# Patient Record
Sex: FEMALE | Race: WHITE | Employment: OTHER | URBAN - METROPOLITAN AREA
[De-identification: names, ages, dates, MRNs, and addresses within clinical notes are randomized per-mention and may not be internally consistent; named-entity substitution may affect disease eponyms.]

---

## 2017-02-02 PROBLEM — M17.0 PRIMARY OSTEOARTHRITIS OF BOTH KNEES: Status: ACTIVE | Noted: 2017-02-02

## 2017-02-06 PROBLEM — Z85.828 HISTORY OF MALIGNANT NEOPLASM OF SKIN: Status: ACTIVE | Noted: 2017-02-06

## 2017-02-06 PROBLEM — R20.8 ELECTRICAL SHOCK SENSATION: Status: ACTIVE | Noted: 2017-02-06

## 2017-02-06 PROBLEM — M17.0 PRIMARY OSTEOARTHRITIS OF BOTH KNEES: Status: RESOLVED | Noted: 2017-02-02 | Resolved: 2017-02-06

## 2017-02-06 NOTE — TELEPHONE ENCOUNTER
LFV:9/26/16 with SD  Future Appt: none on file  Last Rx:9/26/16 for #30 w/3  Last Labs:10/19/16 BUN elevated  Per protocol to provider  Patient was due back in Dec for follow up -  please schedule

## 2017-02-06 NOTE — TELEPHONE ENCOUNTER
Pt sched  on wed 2/8 w/SD-please fill meds or wait for her to come in to fill it-she is going out of town Friday AM

## 2017-02-07 NOTE — TELEPHONE ENCOUNTER
Future Appointments  Date Time Provider Audie Dooley   2/8/2017 1:00 PM [facility]

## 2017-02-08 ENCOUNTER — OFFICE VISIT (OUTPATIENT)
Dept: INTERNAL MEDICINE CLINIC | Facility: CLINIC | Age: 77
End: 2017-02-08

## 2017-02-08 VITALS
SYSTOLIC BLOOD PRESSURE: 122 MMHG | WEIGHT: 146 LBS | BODY MASS INDEX: 24.92 KG/M2 | TEMPERATURE: 98 F | HEIGHT: 64 IN | DIASTOLIC BLOOD PRESSURE: 60 MMHG | HEART RATE: 64 BPM

## 2017-02-08 DIAGNOSIS — Z85.828 PERSONAL HISTORY OF OTHER MALIGNANT NEOPLASM OF SKIN: ICD-10-CM

## 2017-02-08 DIAGNOSIS — M47.816 LUMBAR FACET ARTHROPATHY: ICD-10-CM

## 2017-02-08 DIAGNOSIS — IMO0002 OSTEOARTHROSIS INVOLVING LOWER LEG: ICD-10-CM

## 2017-02-08 DIAGNOSIS — M54.16 LUMBAR RADICULITIS: ICD-10-CM

## 2017-02-08 DIAGNOSIS — M17.12 PRIMARY OSTEOARTHRITIS OF LEFT KNEE: ICD-10-CM

## 2017-02-08 DIAGNOSIS — I77.9 BILATERAL CAROTID ARTERY DISEASE (HCC): ICD-10-CM

## 2017-02-08 DIAGNOSIS — Z23 NEED FOR VACCINATION: ICD-10-CM

## 2017-02-08 DIAGNOSIS — E78.5 DYSLIPIDEMIA: Primary | ICD-10-CM

## 2017-02-08 DIAGNOSIS — Z12.31 VISIT FOR SCREENING MAMMOGRAM: ICD-10-CM

## 2017-02-08 DIAGNOSIS — R31.9 HEMATURIA: ICD-10-CM

## 2017-02-08 DIAGNOSIS — M47.818 ARTHRITIS OF SACROILIAC JOINT: ICD-10-CM

## 2017-02-08 DIAGNOSIS — S83.282D ACUTE LATERAL MENISCUS TEAR OF LEFT KNEE, SUBSEQUENT ENCOUNTER: ICD-10-CM

## 2017-02-08 DIAGNOSIS — Z98.890 S/P LEFT KNEE ARTHROSCOPY: ICD-10-CM

## 2017-02-08 DIAGNOSIS — M47.817 SPONDYLOSIS OF LUMBOSACRAL REGION WITHOUT MYELOPATHY OR RADICULOPATHY: ICD-10-CM

## 2017-02-08 DIAGNOSIS — E53.8 VITAMIN B 12 DEFICIENCY: ICD-10-CM

## 2017-02-08 DIAGNOSIS — S83.242A TEAR OF MEDIAL MENISCUS OF LEFT KNEE, CURRENT, UNSPECIFIED TEAR TYPE, INITIAL ENCOUNTER: ICD-10-CM

## 2017-02-08 DIAGNOSIS — S83.242D ACUTE MEDIAL MENISCUS TEAR, LEFT, SUBSEQUENT ENCOUNTER: ICD-10-CM

## 2017-02-08 DIAGNOSIS — M85.88 OSTEOPENIA OF SPINE: ICD-10-CM

## 2017-02-08 DIAGNOSIS — R20.8 ELECTRICAL SHOCK SENSATION: ICD-10-CM

## 2017-02-08 DIAGNOSIS — F41.9 ANXIETY: ICD-10-CM

## 2017-02-08 DIAGNOSIS — S83.242D ACUTE MEDIAL MENISCUS TEAR OF LEFT KNEE, SUBSEQUENT ENCOUNTER: ICD-10-CM

## 2017-02-08 DIAGNOSIS — J30.9 ALLERGIC RHINITIS, UNSPECIFIED ALLERGIC RHINITIS TRIGGER, UNSPECIFIED RHINITIS SEASONALITY: ICD-10-CM

## 2017-02-08 DIAGNOSIS — Z85.828 HISTORY OF MALIGNANT NEOPLASM OF SKIN: ICD-10-CM

## 2017-02-08 DIAGNOSIS — M17.0 PRIMARY OSTEOARTHRITIS OF BOTH KNEES: ICD-10-CM

## 2017-02-08 DIAGNOSIS — R32 URINARY INCONTINENCE, UNSPECIFIED TYPE: ICD-10-CM

## 2017-02-08 DIAGNOSIS — Z00.00 ENCOUNTER FOR ANNUAL HEALTH EXAMINATION: ICD-10-CM

## 2017-02-08 DIAGNOSIS — E55.9 VITAMIN D DEFICIENCY: ICD-10-CM

## 2017-02-08 DIAGNOSIS — M17.11 PRIMARY OSTEOARTHRITIS OF RIGHT KNEE: ICD-10-CM

## 2017-02-08 PROCEDURE — 99214 OFFICE O/P EST MOD 30 MIN: CPT | Performed by: NURSE PRACTITIONER

## 2017-02-08 PROCEDURE — G0009 ADMIN PNEUMOCOCCAL VACCINE: HCPCS | Performed by: NURSE PRACTITIONER

## 2017-02-08 PROCEDURE — G0438 PPPS, INITIAL VISIT: HCPCS | Performed by: NURSE PRACTITIONER

## 2017-02-08 PROCEDURE — 90670 PCV13 VACCINE IM: CPT | Performed by: NURSE PRACTITIONER

## 2017-02-08 RX ORDER — MELATONIN
1000 DAILY
COMMUNITY
End: 2017-10-25

## 2017-02-08 NOTE — PATIENT INSTRUCTIONS
Melissa Mckeon's SCREENING SCHEDULE   Tests on this list are recommended by your physician but may not be covered, or covered at this frequency, by your insurer. Please check with your insurance carrier before scheduling to verify coverage.    PREVENTATIVE cigarettes in their lifetime   • Anyone with a family history    Colorectal Cancer Screening  Covered up to Age 76     Colonoscopy Screen   Covered every 10 years- more often if abnormal Colonoscopy,3 Years due on 04/08/2018 Update Health Maintenance if ap Pneumococcal 13 (Prevnar)  Covered Once after 65 No orders found for this or any previous visit. Please get once after your 65th birthday    Pneumococcal 23 (Pneumovax)  Covered Once after 65 No orders found for this or any previous visit.  Please get once

## 2017-02-08 NOTE — PROGRESS NOTES
HPI:   Jacqui Lieberman is a 64year old female who presents for a Medicare Initial Annual Wellness visit (Once after 12 month Medicare anniversery) .     Dyslipidemia   Due for lipid  Last done 12/15 with elevated total.  Her activity has been less due to k Electrical shock sensation   None recently   MRI brain normal.      Lumbar arthritis:  Stable. Continue to monitor. History of malignant neoplasm of skin  Sees Dermatology  Dr Blade Marina. Anxiety:  Sertraline   Doing well.   Increased stress with h 20 10/19/2016   ALT 28 10/19/2016   CA 9.2 10/19/2016   ALB 3.5 10/19/2016   TSH 1.760 12/31/2015   CREATSERUM 0.80 10/19/2016   * 10/19/2016        CBC  (most recent labs)     Lab Results  Component Value Date   WBC 5.8 12/31/2015   HGB 13.6 12/31/ GENERAL: feels well otherwise  SKIN: denies any unusual skin lesions  EYES: denies blurred vision or double vision  HEENT: denies nasal congestion, sinus pain or ST  LUNGS: denies shortness of breath with exertion  CARDIOVASCULAR: denies chest pain on ex and axillary nodes normal   Neurologic: Normal    and Breasts:  normal appearance, no masses or tenderness    SUGGESTED VACCINATIONS - Influenza, Pneumococcal, Zoster, Tetanus     Immunization History   Administered Date(s) Administered   • Depo-Medrol 40m TKR    Bilateral carotid artery disease (HCC)  Stable. Monitor.   Check lipid    Personal history of other malignant neoplasm of skin    Dermatology  Dr Veronica Lucia      Primary osteoarthritis of left knee  Per Dr Bre Henriquez  Primary osteoarthritis of right knee  Per and agrees to the plan. No Follow-up on file.      MAMTA Moe, 2/8/2017     General Health     In the past six months, have you lost more than 10 pounds without trying?: 2 - No    Has your appetite been poor?: No    How does the patient maintain a (over the last two weeks)?: Not at all    Feeling down, depressed, or hopeless (over the last two weeks)?: Not at all    PHQ-2 SCORE: 0        Advance Directives     Do you have a healthcare power of ?: Yes    Do you have a living will?: Yes     Pl (CPT=77080) 03/02/2015    No flowsheet data found. Pap and Pelvic      Pap: Every 3 yrs age 21-68 or Pap+HPV every 5 yrs age 33-67, age 72 and older at high risk There are no preventive care reminders to display for this patient.  Update [facility] flowsheet data found. COPD      Spirometry Testing Annually Spirometry date:  No flowsheet data found.          Template: JAMIA GENIA MEDICARE ANNUAL ASSESSMENT FEMALE [75983]

## 2017-03-03 ENCOUNTER — TELEPHONE (OUTPATIENT)
Dept: INTERNAL MEDICINE CLINIC | Facility: CLINIC | Age: 77
End: 2017-03-03

## 2017-03-03 NOTE — TELEPHONE ENCOUNTER
Patient states she has had this metal taste in her mouth for 1 1/2 years.   Discussed with pt possible reasons-pt has a bridge, had pain in an upper left tooth 2 years ago, finally had the tooth pulled and a bridge placed but is still having pain in that ar

## 2017-03-06 ENCOUNTER — OFFICE VISIT (OUTPATIENT)
Dept: INTERNAL MEDICINE CLINIC | Facility: CLINIC | Age: 77
End: 2017-03-06

## 2017-03-06 VITALS
RESPIRATION RATE: 16 BRPM | SYSTOLIC BLOOD PRESSURE: 128 MMHG | BODY MASS INDEX: 24.92 KG/M2 | WEIGHT: 146 LBS | TEMPERATURE: 99 F | HEART RATE: 60 BPM | DIASTOLIC BLOOD PRESSURE: 62 MMHG | HEIGHT: 64 IN

## 2017-03-06 DIAGNOSIS — R43.8 METALLIC TASTE: ICD-10-CM

## 2017-03-06 DIAGNOSIS — M25.512 ACUTE PAIN OF LEFT SHOULDER: ICD-10-CM
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
## 2017-03-06 DIAGNOSIS — F41.9 ANXIETY: ICD-10-CM

## 2017-03-06 DIAGNOSIS — R07.81 RIB PAIN ON LEFT SIDE: Primary | ICD-10-CM

## 2017-03-06 PROCEDURE — 99214 OFFICE O/P EST MOD 30 MIN: CPT | Performed by: NURSE PRACTITIONER

## 2017-03-06 RX ORDER — ESCITALOPRAM OXALATE 10 MG/1
10 TABLET ORAL DAILY
Qty: 90 TABLET | Refills: 1 | Status: SHIPPED | OUTPATIENT
Start: 2017-03-06 | End: 2017-08-21

## 2017-03-06 RX ORDER — NAPROXEN 500 MG/1
500 TABLET ORAL 2 TIMES DAILY
Qty: 30 TABLET | Refills: 0 | Status: SHIPPED | OUTPATIENT
Start: 2017-03-06 | End: 2017-08-21 | Stop reason: ALTCHOICE

## 2017-03-06 NOTE — TELEPHONE ENCOUNTER
Patient notified Sertraline may be the problem and would like to try another medication.   Pt states she fell on her side last week while in [place], breast bone sore and not getting better, when she coughs, with some movement, changes position in bed inter

## 2017-03-07 ENCOUNTER — HOSPITAL ENCOUNTER (OUTPATIENT)
Dept: GENERAL RADIOLOGY | Age: 77
Discharge: HOME | End: 2017-03-07
Attending: NURSE PRACTITIONER
Payer: MEDICARE

## 2017-03-07 DIAGNOSIS — M25.512 ACUTE PAIN OF LEFT SHOULDER: ICD-10-CM
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
## 2017-03-07 DIAGNOSIS — R07.81 RIB PAIN ON LEFT SIDE: ICD-10-CM

## 2017-03-07 PROCEDURE — 73030 X-RAY EXAM OF SHOULDER: CPT

## 2017-03-07 PROCEDURE — 71101 X-RAY EXAM UNILAT RIBS/CHEST: CPT

## 2017-03-16 ENCOUNTER — LAB ENCOUNTER (OUTPATIENT)
Dept: LAB | Age: 77
End: 2017-03-16
Attending: INTERNAL MEDICINE
Payer: MEDICARE

## 2017-03-16 DIAGNOSIS — E53.8 VITAMIN B 12 DEFICIENCY: ICD-10-CM

## 2017-03-16 DIAGNOSIS — R31.9 HEMATURIA: ICD-10-CM

## 2017-03-16 DIAGNOSIS — R32 URINARY INCONTINENCE, UNSPECIFIED TYPE: ICD-10-CM

## 2017-03-16 DIAGNOSIS — E78.5 DYSLIPIDEMIA: ICD-10-CM

## 2017-03-16 DIAGNOSIS — M85.88 OSTEOPENIA OF SPINE: ICD-10-CM

## 2017-03-16 LAB
ALBUMIN SERPL-MCNC: 3.7 G/DL (ref 3.5–4.8)
ALP LIVER SERPL-CCNC: 88 U/L (ref 55–142)
ALT SERPL-CCNC: 21 U/L (ref 14–54)
AST SERPL-CCNC: 18 U/L (ref 15–41)
BILIRUB SERPL-MCNC: 0.4 MG/DL (ref 0.1–2)
BILIRUB UR QL STRIP.AUTO: NEGATIVE
BUN BLD-MCNC: 26 MG/DL (ref 8–20)
CALCIUM BLD-MCNC: 9.6 MG/DL (ref 8.3–10.3)
CHLORIDE: 109 MMOL/L (ref 101–111)
CHOLEST SMN-MCNC: 273 MG/DL (ref ?–200)
CO2: 23 MMOL/L (ref 22–32)
COLOR UR AUTO: YELLOW
CREAT BLD-MCNC: 0.71 MG/DL (ref 0.55–1.02)
GLUCOSE BLD-MCNC: 87 MG/DL (ref 70–99)
GLUCOSE UR STRIP.AUTO-MCNC: NEGATIVE MG/DL
HAV AB SERPL IA-ACNC: 629 PG/ML (ref 193–986)
HDLC SERPL-MCNC: 121 MG/DL (ref 45–?)
HDLC SERPL: 2.26 {RATIO} (ref ?–4.44)
KETONES UR STRIP.AUTO-MCNC: NEGATIVE MG/DL
LDLC SERPL CALC-MCNC: 135 MG/DL (ref ?–130)
M PROTEIN MFR SERPL ELPH: 6.5 G/DL (ref 6.1–8.3)
NITRITE UR QL STRIP.AUTO: NEGATIVE
NONHDLC SERPL-MCNC: 152 MG/DL (ref ?–130)
PH UR STRIP.AUTO: 7 [PH] (ref 4.5–8)
POTASSIUM SERPL-SCNC: 4 MMOL/L (ref 3.6–5.1)
PROT UR STRIP.AUTO-MCNC: NEGATIVE MG/DL
SODIUM SERPL-SCNC: 142 MMOL/L (ref 136–144)
SP GR UR STRIP.AUTO: 1.02 (ref 1–1.03)
TRIGLYCERIDES: 86 MG/DL (ref ?–150)
TSI SER-ACNC: 1.95 MIU/ML (ref 0.35–5.5)
UROBILINOGEN UR STRIP.AUTO-MCNC: <2 MG/DL
VLDL: 17 MG/DL (ref 5–40)

## 2017-03-16 PROCEDURE — 80053 COMPREHEN METABOLIC PANEL: CPT

## 2017-03-16 PROCEDURE — 80061 LIPID PANEL: CPT

## 2017-03-16 PROCEDURE — 36415 COLL VENOUS BLD VENIPUNCTURE: CPT

## 2017-03-16 PROCEDURE — 82607 VITAMIN B-12: CPT

## 2017-03-16 PROCEDURE — 87086 URINE CULTURE/COLONY COUNT: CPT

## 2017-03-16 PROCEDURE — 84443 ASSAY THYROID STIM HORMONE: CPT

## 2017-03-16 PROCEDURE — 81001 URINALYSIS AUTO W/SCOPE: CPT

## 2017-03-29 ENCOUNTER — HOSPITAL ENCOUNTER (OUTPATIENT)
Dept: MAMMOGRAPHY | Age: 77
Discharge: HOME | End: 2017-03-29
Attending: NURSE PRACTITIONER
Payer: MEDICARE

## 2017-03-29 DIAGNOSIS — Z12.31 VISIT FOR SCREENING MAMMOGRAM: ICD-10-CM

## 2017-03-29 PROCEDURE — 77067 SCR MAMMO BI INCL CAD: CPT

## 2017-06-28 PROBLEM — M77.50 TENDONITIS OF FOOT: Status: ACTIVE | Noted: 2017-06-28

## 2017-08-21 NOTE — PROGRESS NOTES
Lev Li is a 76year old female. Patient presents with: Follow - Up: 6 month follow up        HPI:   Here for 6 month f/u. Anxiety  In March changed from sertraline to lexapro due to metallic taste in her mouth. Doing better with lexapro.   No met 1-17-17 / LEFT KNEE / AHK      Electrical shock sensation     History of malignant neoplasm of skin     Dyslipidemia     Anxiety     Tendonitis of foot      Current Outpatient Prescriptions:  escitalopram (LEXAPRO) 10 MG Oral Tab Take 1 tablet (10 mg total constipation  MUSCULOSKELETAL:  No arthralgias or myalgias  NEURO: denies headaches,     EXAM:   /60 (BP Location: Right arm, Patient Position: Sitting, Cuff Size: adult)   Pulse 68   Temp 97.8 °F (36.6 °C) (Oral)   Ht 64\"   Wt 149 lb   BMI 25.58 kg

## 2017-10-25 ENCOUNTER — OFFICE VISIT (OUTPATIENT)
Dept: INTERNAL MEDICINE CLINIC | Facility: CLINIC | Age: 77
End: 2017-10-25

## 2017-10-25 VITALS
OXYGEN SATURATION: 98 % | RESPIRATION RATE: 17 BRPM | WEIGHT: 148 LBS | HEIGHT: 64 IN | HEART RATE: 67 BPM | DIASTOLIC BLOOD PRESSURE: 70 MMHG | SYSTOLIC BLOOD PRESSURE: 126 MMHG | BODY MASS INDEX: 25.27 KG/M2

## 2017-10-25 DIAGNOSIS — F41.9 ANXIETY: ICD-10-CM

## 2017-10-25 DIAGNOSIS — R19.7 DIARRHEA, UNSPECIFIED TYPE: Primary | ICD-10-CM

## 2017-10-25 DIAGNOSIS — R51.9 NONINTRACTABLE HEADACHE, UNSPECIFIED CHRONICITY PATTERN, UNSPECIFIED HEADACHE TYPE: ICD-10-CM

## 2017-10-25 PROCEDURE — 90653 IIV ADJUVANT VACCINE IM: CPT | Performed by: NURSE PRACTITIONER

## 2017-10-25 PROCEDURE — G0008 ADMIN INFLUENZA VIRUS VAC: HCPCS | Performed by: NURSE PRACTITIONER

## 2017-10-25 PROCEDURE — 99214 OFFICE O/P EST MOD 30 MIN: CPT | Performed by: NURSE PRACTITIONER

## 2017-10-25 RX ORDER — FLUTICASONE PROPIONATE 50 MCG
1 SPRAY, SUSPENSION (ML) NASAL 2 TIMES DAILY
Qty: 1 BOTTLE | Refills: 1 | Status: SHIPPED | OUTPATIENT
Start: 2017-10-25 | End: 2019-05-30

## 2017-10-25 NOTE — PROGRESS NOTES
Alie Chavez is a 76year old female. Patient presents with:  Headache: wakes her up ,   Diarrhea: couple months,       HPI:   Presents for eval.  She has not been feeling well. Frequent headaches. Diarrhea more frequently.   Had an extensive left leg Tear of medial meniscus of left knee, current, unspecified tear type, initial encounter     Acute lateral meniscus tear of left knee, subsequent encounter     Acute medial meniscus tear, left, subsequent encounter     S/P left knee arthroscopy     Acute Allergies    Other                       Comment:CALCIUM    REVIEW OF SYSTEMS:   GENERAL HEALTH: feels well otherwise  RESPIRATORY: denies shortness of breath with exertion, no cough  CARDIOVASCULAR: denies chest pain on exertion, no palpatations  GI:

## 2017-11-15 ENCOUNTER — OFFICE VISIT (OUTPATIENT)
Dept: INTERNAL MEDICINE CLINIC | Facility: CLINIC | Age: 77
End: 2017-11-15

## 2017-11-15 VITALS
TEMPERATURE: 98 F | WEIGHT: 145 LBS | SYSTOLIC BLOOD PRESSURE: 122 MMHG | HEART RATE: 68 BPM | DIASTOLIC BLOOD PRESSURE: 64 MMHG | RESPIRATION RATE: 16 BRPM | HEIGHT: 64 IN | BODY MASS INDEX: 24.75 KG/M2

## 2017-11-15 DIAGNOSIS — R09.81 SINUS CONGESTION: ICD-10-CM

## 2017-11-15 DIAGNOSIS — R51.9 NONINTRACTABLE HEADACHE, UNSPECIFIED CHRONICITY PATTERN, UNSPECIFIED HEADACHE TYPE: Primary | ICD-10-CM

## 2017-11-15 DIAGNOSIS — R19.7 DIARRHEA, UNSPECIFIED TYPE: ICD-10-CM

## 2017-11-15 PROCEDURE — 99214 OFFICE O/P EST MOD 30 MIN: CPT | Performed by: NURSE PRACTITIONER

## 2017-11-15 RX ORDER — COVID-19 ANTIGEN TEST
220 KIT MISCELLANEOUS AS NEEDED
COMMUNITY
Start: 2017-09-01 | End: 2019-08-15

## 2017-11-15 NOTE — PROGRESS NOTES
Kavita Bowden is a 76year old female. Patient presents with: Follow - Up: on diarrhea and HA . sx is still the same       HPI:   Here for follow up. Seen late October with diarrhea and headaches. She had been on antibiotics so cdiff was ordered. KNEE / AHK      Electrical shock sensation     History of malignant neoplasm of skin     Dyslipidemia     Anxiety     Tendonitis of foot      Current Outpatient Prescriptions:  Naproxen Sodium (ALEVE) 220 MG Oral Cap  Disp:  Rfl:    Fluticasone Propionate Sitting, Cuff Size: adult)   Pulse 68   Temp 97.6 °F (36.4 °C) (Oral)   Resp 16   Ht 64\"   Wt 145 lb   BMI 24.89 kg/m²   GENERAL: well developed, well nourished,in no apparent distress  HEENT: atraumatic, normocephalic,ears and throat are clear  PND  NECK

## 2017-12-05 ENCOUNTER — HOSPITAL ENCOUNTER (OUTPATIENT)
Dept: PHYSICAL THERAPY | Facility: HOSPITAL | Age: 77
Setting detail: THERAPIES SERIES
Discharge: HOME | End: 2017-12-05
Attending: ORTHOPAEDIC SURGERY
Payer: MEDICARE

## 2017-12-05 DIAGNOSIS — M17.0 PRIMARY OSTEOARTHRITIS OF BOTH KNEES: ICD-10-CM

## 2017-12-05 PROCEDURE — 97161 PT EVAL LOW COMPLEX 20 MIN: CPT

## 2017-12-05 PROCEDURE — 97110 THERAPEUTIC EXERCISES: CPT

## 2017-12-05 NOTE — PROGRESS NOTES
LOWER EXTREMITY EVALUATION:   Referring Physician: Dr. Lissa Spear  Diagnosis: Primary osteoarthritis of both knees (M17.0)     Date of Service: 12/5/2017     PATIENT SUMMARY   Roslyn Mullen is a 76year old female who presents to therapy today with complaints of Date   • Appendectomy     • Colonoscopy  1/21/05, 04/08/2015    Aurea Baxter MD, [facility]   • Knee arthroscopy Left 10/21/16--Dr. Fuchs Current    partial medial and lateral meniscectomies   • Other  1961    bladder surgery, removal of calcified lesion 126  Extension: R 0; L 0        Accessory motion: Hypomobile PFJ superior glide bilaterally    Flexibility:  Hip Flexor: R Tight, L Tight  Hamstrings: R Tight; L Tight    Strength/MMT:   Hip Knee   Flexion: R 5/5; L 5/5  Extension: R 5-/5; L 5-/5  Abductio restricted  Projected G Code:  Mobility: Walking and Moving Around CJ: 20-39% impaired, limited, or restricted    Patient was advised of these findings, precautions, and treatment options and has agreed to actively participate in planning and for this cours

## 2017-12-12 ENCOUNTER — HOSPITAL ENCOUNTER (OUTPATIENT)
Dept: PHYSICAL THERAPY | Facility: HOSPITAL | Age: 77
Setting detail: THERAPIES SERIES
Discharge: HOME | End: 2017-12-12
Attending: ORTHOPAEDIC SURGERY
Payer: MEDICARE

## 2017-12-12 PROCEDURE — 97112 NEUROMUSCULAR REEDUCATION: CPT

## 2017-12-12 PROCEDURE — 97140 MANUAL THERAPY 1/> REGIONS: CPT

## 2017-12-12 PROCEDURE — 97110 THERAPEUTIC EXERCISES: CPT

## 2017-12-12 NOTE — PROGRESS NOTES
Dx:  Primary osteoarthritis of both knees (M17.0)       Authorized # of Visits: 8 per POC Next MD Visit: None scheduled    Fall Risk: Standard Precautions: None     Subjective: Pt states her knees feel okay today.  She has increased posterior thigh pain wit with controled tibial IR    Charges:  TherEx x1 (15 min), Manual x1 (15 min), NMR x1 (15 min)       Total Timed Treatment: 45 min  Total Treatment Time: 45 min

## 2017-12-14 ENCOUNTER — HOSPITAL ENCOUNTER (OUTPATIENT)
Dept: PHYSICAL THERAPY | Facility: HOSPITAL | Age: 77
Setting detail: THERAPIES SERIES
Discharge: HOME | End: 2017-12-14
Attending: ORTHOPAEDIC SURGERY
Payer: MEDICARE

## 2017-12-14 PROCEDURE — 97112 NEUROMUSCULAR REEDUCATION: CPT

## 2017-12-14 PROCEDURE — 97140 MANUAL THERAPY 1/> REGIONS: CPT

## 2017-12-14 PROCEDURE — 97110 THERAPEUTIC EXERCISES: CPT

## 2017-12-14 NOTE — PROGRESS NOTES
Dx:  Primary osteoarthritis of both knees (M17.0)       Authorized # of Visits: 8 per POC Next MD Visit: None scheduled    Fall Risk: Standard Precautions: None     Subjective: Pt states her knees were sore this morning.  Had a busy day yesterday, a lot of R, 3# L; with tactile cues for tibial IR on R with eccentric lower [id number]        Tibial IR mobilizations with knee flexed 45 deg, x5min, with active assist 2x20 reps Tibial IR mobilizations with knee flexed 45 deg, x5min, with active assist 2x20 reps

## 2017-12-19 ENCOUNTER — HOSPITAL ENCOUNTER (OUTPATIENT)
Dept: PHYSICAL THERAPY | Facility: HOSPITAL | Age: 77
Setting detail: THERAPIES SERIES
Discharge: HOME | End: 2017-12-19
Attending: ORTHOPAEDIC SURGERY
Payer: MEDICARE

## 2017-12-19 PROCEDURE — 97140 MANUAL THERAPY 1/> REGIONS: CPT

## 2017-12-19 PROCEDURE — 97112 NEUROMUSCULAR REEDUCATION: CPT

## 2017-12-19 PROCEDURE — 97110 THERAPEUTIC EXERCISES: CPT

## 2017-12-19 NOTE — PROGRESS NOTES
Dx:  Primary osteoarthritis of both knees (M17.0)       Authorized # of Visits: 8 per POC Next MD Visit: None scheduled    Fall Risk: Standard Precautions: None     Subjective: Pt states she is feeling good today.  She had increased knee soreness over the w tactile cues for tibial IR on R with eccentric lower [id number] SAQ 3x10 reps, 3# R, 3# L; with tactile cues for tibial IR on R with eccentric lower [id number]       Tibial IR mobilizations with knee flexed 45 deg, x5min, with active assist 2x20 reps Tibial IR

## 2017-12-21 ENCOUNTER — TELEPHONE (OUTPATIENT)
Dept: PHYSICAL THERAPY | Facility: HOSPITAL | Age: 77
End: 2017-12-21

## 2017-12-21 ENCOUNTER — APPOINTMENT (OUTPATIENT)
Dept: PHYSICAL THERAPY | Facility: HOSPITAL | Age: 77
End: 2017-12-21
Attending: ORTHOPAEDIC SURGERY
Payer: MEDICARE

## 2017-12-21 NOTE — TELEPHONE ENCOUNTER
Pt canceled appointment due to increased hip and knee pain. Pain described over the phone was consistent with delayed onset muscle soreness from exercises on Tuesday.  She was instructed to use ice or heat as needed and to reduce intensity of exercises unti

## 2017-12-28 ENCOUNTER — APPOINTMENT (OUTPATIENT)
Dept: PHYSICAL THERAPY | Facility: HOSPITAL | Age: 77
End: 2017-12-28
Attending: ORTHOPAEDIC SURGERY
Payer: MEDICARE

## 2018-01-04 ENCOUNTER — HOSPITAL ENCOUNTER (OUTPATIENT)
Dept: PHYSICAL THERAPY | Facility: HOSPITAL | Age: 78
Setting detail: THERAPIES SERIES
Discharge: HOME | End: 2018-01-04
Attending: ORTHOPAEDIC SURGERY
Payer: MEDICARE

## 2018-01-04 PROCEDURE — 97112 NEUROMUSCULAR REEDUCATION: CPT

## 2018-01-04 PROCEDURE — 97140 MANUAL THERAPY 1/> REGIONS: CPT

## 2018-01-04 PROCEDURE — 97110 THERAPEUTIC EXERCISES: CPT

## 2018-01-04 NOTE — PROGRESS NOTES
Dx:  Primary osteoarthritis of both knees (M17.0)       Authorized # of Visits: 8 per POC Next MD Visit: None scheduled    Fall Risk: Standard Precautions: None     Subjective: Pt states her right knee is feeling a little better, but her left knee and hip II/III x5 min each leg TFJ AP mobs gr II/III x5 min each leg TFJ AP mobs gr II/III x5 min each leg         Gluteus medius soft tissue mobilization (left) x5 min      Quad set, 10x10 sec hold each leg Quad set, 10x10 sec hold each leg Quad set, 10x10 sec ho

## 2018-01-09 ENCOUNTER — HOSPITAL ENCOUNTER (OUTPATIENT)
Dept: PHYSICAL THERAPY | Facility: HOSPITAL | Age: 78
Setting detail: THERAPIES SERIES
Discharge: HOME | End: 2018-01-09
Attending: ORTHOPAEDIC SURGERY
Payer: MEDICARE

## 2018-01-09 PROCEDURE — 97110 THERAPEUTIC EXERCISES: CPT

## 2018-01-09 PROCEDURE — 97140 MANUAL THERAPY 1/> REGIONS: CPT

## 2018-01-09 PROCEDURE — 97112 NEUROMUSCULAR REEDUCATION: CPT

## 2018-01-09 NOTE — PROGRESS NOTES
Dx:  Primary osteoarthritis of both knees (M17.0)       Authorized # of Visits: 8 per POC Next MD Visit: None scheduled    Fall Risk: Standard Precautions: None     Subjective: Pt states she is feeling better today compared to last week.  Stairs and getting II/III x5 min each leg        Gluteus medius soft tissue mobilization (left) x5 min Gluteus medius soft tissue mobilization (left) x5 min     Quad set, 10x10 sec hold each leg Quad set, 10x10 sec hold each leg Quad set, 10x10 sec hold each leg Quad set, 10

## 2018-01-11 ENCOUNTER — HOSPITAL ENCOUNTER (OUTPATIENT)
Dept: PHYSICAL THERAPY | Facility: HOSPITAL | Age: 78
Setting detail: THERAPIES SERIES
Discharge: HOME | End: 2018-01-11
Attending: ORTHOPAEDIC SURGERY
Payer: MEDICARE

## 2018-01-11 PROCEDURE — 97110 THERAPEUTIC EXERCISES: CPT

## 2018-01-11 PROCEDURE — 97112 NEUROMUSCULAR REEDUCATION: CPT

## 2018-01-11 PROCEDURE — 97140 MANUAL THERAPY 1/> REGIONS: CPT

## 2018-01-11 NOTE — PROGRESS NOTES
Dx:  Primary osteoarthritis of both knees (M17.0)       Authorized # of Visits: 8 per POC Next MD Visit: None scheduled    Fall Risk: Standard Precautions: None     Subjective: Pt states she is feeling great today.  She feels that her sit to stand has impro 10x10 sec hold each leg Quad set, 10x10 sec hold each leg Quad set, 10x10 sec hold each leg Quad set, 10x10 sec hold each leg LAQ 3x10 reps; with red band resistance 3x10 reps    SAQ 3x10 reps, 0# R, 3# L; with tactile cues for tibial IR on R with eccentri

## 2018-01-16 ENCOUNTER — HOSPITAL ENCOUNTER (OUTPATIENT)
Dept: PHYSICAL THERAPY | Facility: HOSPITAL | Age: 78
Setting detail: THERAPIES SERIES
Discharge: HOME | End: 2018-01-16
Attending: ORTHOPAEDIC SURGERY
Payer: MEDICARE

## 2018-01-16 PROCEDURE — 97112 NEUROMUSCULAR REEDUCATION: CPT

## 2018-01-16 PROCEDURE — 97110 THERAPEUTIC EXERCISES: CPT

## 2018-01-16 PROCEDURE — 97140 MANUAL THERAPY 1/> REGIONS: CPT

## 2018-01-16 NOTE — PROGRESS NOTES
Dx:  Primary osteoarthritis of both knees (M17.0)       Authorized # of Visits: 8 per POC Next MD Visit: None scheduled    Fall Risk: Standard Precautions: None      Progress Summary    Pt has attended 8 visits in Physical Therapy.      Assessment: Pt has p 75%  · Pt will be independent and compliant with comprehensive HEP to maintain progress achieved in PT (8 visits) Progressing    Current G Code: Mobility: Walking and Moving Around CK: 40-59% impaired, limited, or restricted  Projected G Code: Mobility:  Wa each leg Quad set, 10x10 sec hold each leg Quad set, 10x10 sec hold each leg LAQ 3x10 reps; with red band resistance 3x10 reps LAQ 3x10 reps; with red band resistance 3x10 reps   SAQ 3x10 reps, 0# R, 3# L; with tactile cues for tibial IR on R with eccentri controled tibial IR, SL clam (no resistance)    Charges:  TherEx x1 (15 min), Manual x1 (15 min), NMR x1 (15 min)       Total Timed Treatment: 45 min  Total Treatment Time: 45 min

## 2018-01-18 ENCOUNTER — HOSPITAL ENCOUNTER (OUTPATIENT)
Dept: PHYSICAL THERAPY | Facility: HOSPITAL | Age: 78
Setting detail: THERAPIES SERIES
Discharge: HOME | End: 2018-01-18
Attending: ORTHOPAEDIC SURGERY
Payer: MEDICARE

## 2018-01-18 PROCEDURE — 97140 MANUAL THERAPY 1/> REGIONS: CPT

## 2018-01-18 PROCEDURE — 97110 THERAPEUTIC EXERCISES: CPT

## 2018-01-18 PROCEDURE — 97112 NEUROMUSCULAR REEDUCATION: CPT

## 2018-01-18 NOTE — PROGRESS NOTES
Dx:  Primary osteoarthritis of both knees (M17.0)       Authorized # of Visits: 16 per updated POC Next MD Visit: None scheduled    Fall Risk: Standard Precautions: None     Subjective: Right knee is still sore, but she didn't feel sore after Tuesday's ses leg     Gluteus medius soft tissue mobilization (left) x5 min Gluteus medius soft tissue mobilization (left) x5 min  -  -  -   Quad set, 10x10 sec hold each leg Quad set, 10x10 sec hold each leg Quad set, 10x10 sec hold each leg Quad set, 10x10 sec hold ea unilateral 12# 2x10 reps    Supine unilateral clam, red band, 3x10 reps Supine unilateral clam, green band, 3x10 reps SL clam, 3x10 each leg SL clam, 3x10 each leg SL clam, 3x10 each leg SL clam, 3x10 each leg SL clam, 3x10 each leg   HEP: hamstring stretc

## 2018-01-23 ENCOUNTER — HOSPITAL ENCOUNTER (OUTPATIENT)
Dept: PHYSICAL THERAPY | Facility: HOSPITAL | Age: 78
Setting detail: THERAPIES SERIES
Discharge: HOME | End: 2018-01-23
Attending: ORTHOPAEDIC SURGERY
Payer: MEDICARE

## 2018-01-23 PROCEDURE — 97112 NEUROMUSCULAR REEDUCATION: CPT

## 2018-01-23 PROCEDURE — 97140 MANUAL THERAPY 1/> REGIONS: CPT

## 2018-01-23 PROCEDURE — 97110 THERAPEUTIC EXERCISES: CPT

## 2018-01-23 NOTE — PROGRESS NOTES
Dx:  Primary osteoarthritis of both knees (M17.0)       Authorized # of Visits: 16 per updated POC Next MD Visit: None scheduled    Fall Risk: Standard Precautions: None     Subjective: Did a lot of walking on Sunday in [facility].  Woke up that ni AP mobs gr II/III x5 min each leg TFJ AP mobs gr II/III x5 min each leg TFJ AP mobs gr II/III x5 min each leg    Gluteus medius soft tissue mobilization (left) x5 min Gluteus medius soft tissue mobilization (left) x5 min  -  -  - Gastroc stretch with towel bilateral 25# 2x10 reps, unilateral  Shuttle leg press, bilateral 37# 2x15 reps, unilateral 12# 2x10 reps   -   Supine unilateral clam, green band, 3x10 reps SL clam, 3x10 each leg SL clam, 3x10 each leg SL clam, 3x10 each leg SL clam, 3x10 each leg SL cla

## 2018-01-25 ENCOUNTER — APPOINTMENT (OUTPATIENT)
Dept: PHYSICAL THERAPY | Facility: HOSPITAL | Age: 78
End: 2018-01-25
Attending: ORTHOPAEDIC SURGERY
Payer: MEDICARE

## 2018-01-25 ENCOUNTER — HOSPITAL ENCOUNTER (OUTPATIENT)
Dept: PHYSICAL THERAPY | Facility: HOSPITAL | Age: 78
Setting detail: THERAPIES SERIES
Discharge: HOME | End: 2018-01-25
Attending: ORTHOPAEDIC SURGERY
Payer: MEDICARE

## 2018-01-25 PROCEDURE — 97140 MANUAL THERAPY 1/> REGIONS: CPT

## 2018-01-25 PROCEDURE — 97112 NEUROMUSCULAR REEDUCATION: CPT

## 2018-01-25 PROCEDURE — 97110 THERAPEUTIC EXERCISES: CPT

## 2018-01-25 NOTE — PROGRESS NOTES
Dx:  Primary osteoarthritis of both knees (M17.0)       Authorized # of Visits: 16 per updated POC Next MD Visit: None scheduled    Fall Risk: Standard Precautions: None      Discharge Summary    Pt has attended 11 visits in Physical Therapy.      Subjectiv 1/25/18    Discharge G Code: Mobility: Walking and Moving Around CJ: 20-39% impaired, limited, or restricted  Projected G Code:  Mobility: Walking and Moving Around CJ: 20-39% impaired, limited, or restricted    Plan: Discharge from PT to HEP       Patient [id number] SAQ 3x10 reps, 3# R, 3# L; with tactile cues for tibial IR on R with eccentric lower [id number] SAQ 3x10 reps, 3# R, 3# L; with tactile cues for tibial IR on R with eccentric lower [id number] SAQ 3x10 reps, 3# R, 3# L; with tactile cues for tibial IR

## 2018-03-05 RX ORDER — ESCITALOPRAM OXALATE 10 MG/1
TABLET ORAL
Qty: 90 TABLET | Refills: 0 | Status: SHIPPED | OUTPATIENT
Start: 2018-03-05 | End: 2018-03-09

## 2018-03-05 NOTE — TELEPHONE ENCOUNTER
Medication(s) to Refill:   Pending Prescriptions Disp Refills    ESCITALOPRAM 10 MG Oral Tab [Pharmacy Med Name: Escitalopram Oxalate Oral Tablet 10 MG] 30 tablet 0     Sig: TAKE ONE TABLET BY MOUTH ONE TIME DAILY              Reason for Medication Refill being sent to Provider / Reason Protocol Failed:  [x] 90 day judith period ended  [] Blood Pressure out of range  [] Labs Abnormal  [] Medication not previously prescribed by Provider  [] Non-Protocol Medication  [] Prescription needs to be printed at site and faxed to pharmacy  [] Controlled Substance - needs to be printed at the site, site to notify patient when ready    Last Time Medication was Filled:  08/21/2017      Last Office Visit with PCP: 11/15/2017    When Patient was Due Back to the Office:    (from when PCP last addressed condition)    Future Appointments:  Future Appointments  Date Time Provider Audie Dooley   3/9/2018 2:30 PM [facility]         Last Blood Pressures:  BP Readings from Last 2 Encounters:  11/15/17 : 122/64  10/25/17 : 126/70

## 2018-03-09 ENCOUNTER — OFFICE VISIT (OUTPATIENT)
Dept: INTERNAL MEDICINE CLINIC | Facility: CLINIC | Age: 78
End: 2018-03-09

## 2018-03-09 VITALS
HEIGHT: 64 IN | TEMPERATURE: 97 F | DIASTOLIC BLOOD PRESSURE: 80 MMHG | BODY MASS INDEX: 25.61 KG/M2 | SYSTOLIC BLOOD PRESSURE: 130 MMHG | RESPIRATION RATE: 17 BRPM | HEART RATE: 80 BPM | WEIGHT: 150 LBS

## 2018-03-09 DIAGNOSIS — M62.89 PELVIC FLOOR DYSFUNCTION: ICD-10-CM

## 2018-03-09 DIAGNOSIS — E78.5 DYSLIPIDEMIA: ICD-10-CM

## 2018-03-09 DIAGNOSIS — Z85.828 PERSONAL HISTORY OF OTHER MALIGNANT NEOPLASM OF SKIN: ICD-10-CM

## 2018-03-09 DIAGNOSIS — M17.12 PRIMARY OSTEOARTHRITIS OF LEFT KNEE: ICD-10-CM

## 2018-03-09 DIAGNOSIS — E53.8 VITAMIN B 12 DEFICIENCY: ICD-10-CM

## 2018-03-09 DIAGNOSIS — IMO0002 OSTEOARTHROSIS INVOLVING LOWER LEG: ICD-10-CM

## 2018-03-09 DIAGNOSIS — R20.8 ELECTRICAL SHOCK SENSATION: ICD-10-CM

## 2018-03-09 DIAGNOSIS — J30.9 ALLERGIC RHINITIS, UNSPECIFIED SEASONALITY, UNSPECIFIED TRIGGER: ICD-10-CM

## 2018-03-09 DIAGNOSIS — S83.242A TEAR OF MEDIAL MENISCUS OF LEFT KNEE, CURRENT, UNSPECIFIED TEAR TYPE, INITIAL ENCOUNTER: ICD-10-CM

## 2018-03-09 DIAGNOSIS — M47.816 LUMBAR FACET ARTHROPATHY: ICD-10-CM

## 2018-03-09 DIAGNOSIS — Z12.31 ENCOUNTER FOR SCREENING MAMMOGRAM FOR MALIGNANT NEOPLASM OF BREAST: ICD-10-CM

## 2018-03-09 DIAGNOSIS — R31.1 BENIGN ESSENTIAL MICROSCOPIC HEMATURIA: ICD-10-CM

## 2018-03-09 DIAGNOSIS — Z78.0 POST-MENOPAUSAL: ICD-10-CM

## 2018-03-09 DIAGNOSIS — M47.817 SPONDYLOSIS OF LUMBOSACRAL REGION WITHOUT MYELOPATHY OR RADICULOPATHY: ICD-10-CM

## 2018-03-09 DIAGNOSIS — M47.818 ARTHRITIS OF SACROILIAC JOINT: ICD-10-CM

## 2018-03-09 DIAGNOSIS — Z85.828 HISTORY OF MALIGNANT NEOPLASM OF SKIN: ICD-10-CM

## 2018-03-09 DIAGNOSIS — M85.88 OSTEOPENIA OF SPINE: ICD-10-CM

## 2018-03-09 DIAGNOSIS — F41.9 ANXIETY: ICD-10-CM

## 2018-03-09 DIAGNOSIS — Z00.00 ENCOUNTER FOR ANNUAL HEALTH EXAMINATION: Primary | ICD-10-CM

## 2018-03-09 DIAGNOSIS — I77.9 BILATERAL CAROTID ARTERY DISEASE (HCC): ICD-10-CM

## 2018-03-09 DIAGNOSIS — Z98.890 S/P LEFT KNEE ARTHROSCOPY: ICD-10-CM

## 2018-03-09 DIAGNOSIS — M77.50 TENDONITIS OF FOOT: ICD-10-CM

## 2018-03-09 DIAGNOSIS — M17.11 PRIMARY OSTEOARTHRITIS OF RIGHT KNEE: ICD-10-CM

## 2018-03-09 DIAGNOSIS — E55.9 VITAMIN D DEFICIENCY: ICD-10-CM

## 2018-03-09 PROCEDURE — G0439 PPPS, SUBSEQ VISIT: HCPCS | Performed by: NURSE PRACTITIONER

## 2018-03-09 RX ORDER — ESCITALOPRAM OXALATE 10 MG/1
10 TABLET ORAL
Qty: 90 TABLET | Refills: 3 | Status: SHIPPED | OUTPATIENT
Start: 2018-03-09 | End: 2018-05-30

## 2018-03-09 NOTE — PROGRESS NOTES
HPI:   Brandon York is a 76year old female who presents for a Medicare Subsequent Annual Wellness visit (Pt already had Initial Annual Wellness).   They have recently bought a condo in [place]   She is very happy to be able to spend time down there with Future    Post-menopausal  -     XR DEXA BONE DENSITOMETRY (CPT=77080); Future    Pelvic floor dysfunction  Did not do PT in the past when we discussed  s eems to be worsening. Check UA with labs. Refer for pelvic floor rehab.    -     OP REFERRAL TO EDW Assistant)  Esdras Truong MD (SURGERY, ORTHOPEDIC)    Patient Active Problem List:     Vitamin D deficiency     Allergic rhinitis     Neuralgia, neuritis, and radiculitis, unspecified     Osteopenia     Vitamin B 12 deficiency     Lumbosacral spondylosis zoster without mention of complication; Lumbago; Myalgia and myositis, unspecified; Personal history of other malignant neoplasm of skin (3/23/2016); Rash and other nonspecific skin eruption; Toxoplasmosis (1982); and Unspecified vitamin D deficiency.     Christian Lin 64\".    Weight as of this encounter: 150 lb.     Medicare Hearing Assessment  (Required for AWV/SWV)    Finger Rub       Visual Acuity                           General Appearance:  Alert, cooperative, no distress, appears stated age   Head:  Normocephalic 11/15/2013, 11/18/2013, 11/22/2013, 11/26/2013, 12/06/2013, 12/12/2013, 12/19/2013, 12/27/2013   • Zoster Vaccine Live (Zostavax) 07/08/2011        ASSESSMENT AND OTHER RELEVANT CHRONIC CONDITIONS:   Roslyn Poster is a 76year old female who presents for a screening mammogram for malignant neoplasm of breast  -     GENNY SCREENING BILAT (CPT=77067); Future    Post-menopausal  -     XR DEXA BONE DENSITOMETRY (CPT=77080);  Future    Pelvic floor dysfunction  Did not do PT in the past when we discussed  s eems to Screen every 10 years   Dr Stefano Jeans Years due on 04/08/2018 Update Health Maintenance if applicable    Flex Sigmoidoscopy Screen every 10 years No results found for this or any previous visit. No flowsheet data found.      Fecal Occult Blood A prescription benefits, but Medicare does not cover unless Medically needed    Zoster  Not covered by Medicare Part B No vaccine history found This may be covered with your pharmacy  prescription benefits                    Template: [address]

## 2018-03-09 NOTE — PATIENT INSTRUCTIONS
Italia Mckeon's SCREENING SCHEDULE   Tests on this list are recommended by your physician but may not be covered, or covered at this frequency, by your insurer. Please check with your insurance carrier before scheduling to verify coverage.    PREVENTATIVE one of the following criteria:   • Men who are 73-68 years old and have smoked more than 100 cigarettes in their lifetime   • Anyone with a family history    Colorectal Cancer Screening  Covered up to Age 76     Colonoscopy Screen   Covered every 10 years- regularly   Immunizations      Influenza  Covered Annually No orders found for this or any previous visit.  Please get every year    Pneumococcal 13 (Prevnar)  Covered Once after 65   Orders placed or performed in visit on 02/08/17  -PNEUMOCOCCAL VACC, 13 V

## 2018-03-15 ENCOUNTER — OFFICE VISIT (OUTPATIENT)
Dept: INTERNAL MEDICINE CLINIC | Facility: CLINIC | Age: 78
End: 2018-03-15

## 2018-03-15 VITALS
HEART RATE: 68 BPM | WEIGHT: 150 LBS | TEMPERATURE: 98 F | DIASTOLIC BLOOD PRESSURE: 64 MMHG | BODY MASS INDEX: 25.61 KG/M2 | SYSTOLIC BLOOD PRESSURE: 128 MMHG | HEIGHT: 64 IN | RESPIRATION RATE: 12 BRPM

## 2018-03-15 DIAGNOSIS — H92.01 RIGHT EAR PAIN: Primary | ICD-10-CM

## 2018-03-15 PROCEDURE — 99213 OFFICE O/P EST LOW 20 MIN: CPT | Performed by: NURSE PRACTITIONER

## 2018-03-15 RX ORDER — AMOXICILLIN 500 MG/1
500 CAPSULE ORAL 3 TIMES DAILY
Qty: 30 CAPSULE | Refills: 0 | Status: SHIPPED | OUTPATIENT
Start: 2018-03-15 | End: 2018-03-25

## 2018-03-15 NOTE — PROGRESS NOTES
Dereck Campuzano is a 76year old female. Patient presents with:  Ear Pain: pt is having ear pain on R side since this morning. LB-room 11      HPI:   Here for her husbands AWV and asked to be seen for ear pain. Right sided.   This is the ear she wears the • Lumbago    • Myalgia and myositis, unspecified    • Personal history of other malignant neoplasm of skin 3/23/2016    Hx BCC x2   • Rash and other nonspecific skin eruption    • Toxoplasmosis 1982   • Unspecified vitamin D deficiency       Social Histo of the defined types were placed in this encounter. Meds & Refills for this Visit:  Signed Prescriptions Disp Refills    amoxicillin 500 MG Oral Cap 30 capsule 0      Sig: Take 1 capsule (500 mg total) by mouth 3 (three) times daily.            Imaging

## 2018-03-30 ENCOUNTER — HOSPITAL ENCOUNTER (OUTPATIENT)
Dept: BONE DENSITY | Age: 78
Discharge: HOME | End: 2018-03-30
Attending: NURSE PRACTITIONER
Payer: MEDICARE

## 2018-03-30 ENCOUNTER — HOSPITAL ENCOUNTER (OUTPATIENT)
Dept: MAMMOGRAPHY | Age: 78
Discharge: HOME | End: 2018-03-30
Attending: NURSE PRACTITIONER
Payer: MEDICARE

## 2018-03-30 DIAGNOSIS — Z78.0 POST-MENOPAUSAL: ICD-10-CM

## 2018-03-30 DIAGNOSIS — M85.88 OSTEOPENIA OF SPINE: ICD-10-CM

## 2018-03-30 DIAGNOSIS — Z12.31 ENCOUNTER FOR SCREENING MAMMOGRAM FOR MALIGNANT NEOPLASM OF BREAST: ICD-10-CM

## 2018-03-30 PROCEDURE — 77080 DXA BONE DENSITY AXIAL: CPT | Performed by: NURSE PRACTITIONER

## 2018-03-30 PROCEDURE — 77067 SCR MAMMO BI INCL CAD: CPT | Performed by: NURSE PRACTITIONER

## 2018-03-30 PROCEDURE — 77063 BREAST TOMOSYNTHESIS BI: CPT | Performed by: NURSE PRACTITIONER

## 2018-04-09 ENCOUNTER — LAB ENCOUNTER (OUTPATIENT)
Dept: LAB | Age: 78
End: 2018-04-09
Attending: NURSE PRACTITIONER
Payer: MEDICARE

## 2018-04-09 DIAGNOSIS — E53.8 VITAMIN B 12 DEFICIENCY: ICD-10-CM

## 2018-04-09 DIAGNOSIS — M85.88 OSTEOPENIA OF SPINE: ICD-10-CM

## 2018-04-09 DIAGNOSIS — F41.9 ANXIETY: ICD-10-CM

## 2018-04-09 DIAGNOSIS — E78.5 DYSLIPIDEMIA: ICD-10-CM

## 2018-04-09 DIAGNOSIS — I77.9 BILATERAL CAROTID ARTERY DISEASE (HCC): ICD-10-CM

## 2018-04-09 PROCEDURE — 80053 COMPREHEN METABOLIC PANEL: CPT

## 2018-04-09 PROCEDURE — 80061 LIPID PANEL: CPT

## 2018-04-09 PROCEDURE — 84443 ASSAY THYROID STIM HORMONE: CPT

## 2018-04-09 PROCEDURE — 85025 COMPLETE CBC W/AUTO DIFF WBC: CPT

## 2018-04-09 PROCEDURE — 82607 VITAMIN B-12: CPT

## 2018-04-10 ENCOUNTER — HOSPITAL ENCOUNTER (OUTPATIENT)
Dept: MAMMOGRAPHY | Facility: HOSPITAL | Age: 78
Discharge: HOME | End: 2018-04-10
Attending: NURSE PRACTITIONER
Payer: MEDICARE

## 2018-04-10 ENCOUNTER — APPOINTMENT (OUTPATIENT)
Dept: PHYSICAL THERAPY | Facility: HOSPITAL | Age: 78
End: 2018-04-10
Attending: ORTHOPAEDIC SURGERY
Payer: MEDICARE

## 2018-04-10 DIAGNOSIS — R92.8 ABNORMAL MAMMOGRAM OF LEFT BREAST: ICD-10-CM

## 2018-04-10 PROCEDURE — 77065 DX MAMMO INCL CAD UNI: CPT | Performed by: NURSE PRACTITIONER

## 2018-04-12 ENCOUNTER — APPOINTMENT (OUTPATIENT)
Dept: PHYSICAL THERAPY | Facility: HOSPITAL | Age: 78
End: 2018-04-12
Attending: ORTHOPAEDIC SURGERY
Payer: MEDICARE

## 2018-04-16 ENCOUNTER — APPOINTMENT (OUTPATIENT)
Dept: PHYSICAL THERAPY | Facility: HOSPITAL | Age: 78
End: 2018-04-16
Attending: ORTHOPAEDIC SURGERY
Payer: MEDICARE

## 2018-04-16 ENCOUNTER — OFFICE VISIT (OUTPATIENT)
Dept: INTERNAL MEDICINE CLINIC | Facility: CLINIC | Age: 78
End: 2018-04-16

## 2018-04-16 VITALS
SYSTOLIC BLOOD PRESSURE: 118 MMHG | HEART RATE: 80 BPM | HEIGHT: 64 IN | BODY MASS INDEX: 25.61 KG/M2 | WEIGHT: 150 LBS | TEMPERATURE: 98 F | RESPIRATION RATE: 14 BRPM | DIASTOLIC BLOOD PRESSURE: 60 MMHG

## 2018-04-16 DIAGNOSIS — E78.5 DYSLIPIDEMIA: Primary | ICD-10-CM

## 2018-04-16 DIAGNOSIS — I77.9 BILATERAL CAROTID ARTERY DISEASE (HCC): ICD-10-CM

## 2018-04-16 PROCEDURE — 99213 OFFICE O/P EST LOW 20 MIN: CPT | Performed by: NURSE PRACTITIONER

## 2018-04-16 RX ORDER — MOMETASONE FUROATE 1 MG/G
CREAM TOPICAL
COMMUNITY
Start: 2018-03-16

## 2018-04-16 RX ORDER — ATORVASTATIN CALCIUM 10 MG/1
TABLET, FILM COATED ORAL
Qty: 45 TABLET | Refills: 0 | Status: SHIPPED | OUTPATIENT
Start: 2018-04-16 | End: 2018-05-31

## 2018-04-16 NOTE — PROGRESS NOTES
Alie Chavez is a 76year old female. Patient presents with:  Lab Results: AB RM 11,       HPI:   Here to discuss her labs    Dyslipidemia:  Total and LDL up from last year   . .    Carotid artery disease less than 50% bilaterally   Discuss Headache(784.0)    • Herpes zoster without mention of complication    • Lumbago    • Myalgia and myositis, unspecified    • Personal history of other malignant neoplasm of skin 3/23/2016    Hx BCC x2   • Rash and other nonspecific skin eruption    • Toxopl days per week           Imaging & Consults:  None    No Follow-up on file. There are no Patient Instructions on file for this visit.

## 2018-04-18 ENCOUNTER — APPOINTMENT (OUTPATIENT)
Dept: PHYSICAL THERAPY | Facility: HOSPITAL | Age: 78
End: 2018-04-18
Attending: ORTHOPAEDIC SURGERY
Payer: MEDICARE

## 2018-04-19 ENCOUNTER — TELEPHONE (OUTPATIENT)
Dept: INTERNAL MEDICINE CLINIC | Facility: CLINIC | Age: 78
End: 2018-04-19

## 2018-04-19 NOTE — TELEPHONE ENCOUNTER
Patient states she picked up the following medication:    Atorvastatin    She states she is allergic to calcium and is concerned about taking this. Please advise.         Patient states she will not be home until after 1 pm and asks that you do not call he

## 2018-04-19 NOTE — TELEPHONE ENCOUNTER
Calcium is an inactive ingredient in chol lowering medications. crestor is rosuvastatin calcium. It is not an active ingredient that would lead to calcium deposits or nephrolithiasis.

## 2018-04-19 NOTE — TELEPHONE ENCOUNTER
Most likely not an issue as calcium is in many foods we eat as well. What was her reaction to calcium?

## 2018-04-19 NOTE — TELEPHONE ENCOUNTER
Patient notified calcium is not an active ingredient in the Crestor and would not lead to calcium deposits or nephrolithiasis. Pt states she talked to her pharmacist who said the same thing. Pt feels comfortable with the medication now.  CHESTER

## 2018-04-19 NOTE — TELEPHONE ENCOUNTER
Pt stating SD is already aware of her hx with calcium as pt easily gets calcium deposits that immediately cause kidney stones. Has had major surgery in past due to calcium deposits. Pt is aware and restricts foods high in calcium.  Avoids supplements contai

## 2018-04-23 ENCOUNTER — APPOINTMENT (OUTPATIENT)
Dept: PHYSICAL THERAPY | Facility: HOSPITAL | Age: 78
End: 2018-04-23
Attending: ORTHOPAEDIC SURGERY
Payer: MEDICARE

## 2018-04-25 ENCOUNTER — APPOINTMENT (OUTPATIENT)
Dept: PHYSICAL THERAPY | Facility: HOSPITAL | Age: 78
End: 2018-04-25
Attending: NURSE PRACTITIONER
Payer: MEDICARE

## 2018-04-25 ENCOUNTER — APPOINTMENT (OUTPATIENT)
Dept: PHYSICAL THERAPY | Facility: HOSPITAL | Age: 78
End: 2018-04-25
Attending: ORTHOPAEDIC SURGERY
Payer: MEDICARE

## 2018-04-30 ENCOUNTER — APPOINTMENT (OUTPATIENT)
Dept: PHYSICAL THERAPY | Facility: HOSPITAL | Age: 78
End: 2018-04-30
Attending: ORTHOPAEDIC SURGERY
Payer: MEDICARE

## 2018-05-02 ENCOUNTER — APPOINTMENT (OUTPATIENT)
Dept: PHYSICAL THERAPY | Facility: HOSPITAL | Age: 78
End: 2018-05-02
Attending: NURSE PRACTITIONER
Payer: MEDICARE

## 2018-05-02 ENCOUNTER — APPOINTMENT (OUTPATIENT)
Dept: PHYSICAL THERAPY | Facility: HOSPITAL | Age: 78
End: 2018-05-02
Attending: ORTHOPAEDIC SURGERY
Payer: MEDICARE

## 2018-05-09 ENCOUNTER — APPOINTMENT (OUTPATIENT)
Dept: PHYSICAL THERAPY | Facility: HOSPITAL | Age: 78
End: 2018-05-09
Attending: NURSE PRACTITIONER
Payer: MEDICARE

## 2018-05-16 ENCOUNTER — APPOINTMENT (OUTPATIENT)
Dept: PHYSICAL THERAPY | Facility: HOSPITAL | Age: 78
End: 2018-05-16
Attending: NURSE PRACTITIONER
Payer: MEDICARE

## 2018-05-23 ENCOUNTER — APPOINTMENT (OUTPATIENT)
Dept: PHYSICAL THERAPY | Facility: HOSPITAL | Age: 78
End: 2018-05-23
Attending: NURSE PRACTITIONER
Payer: MEDICARE

## 2018-05-30 ENCOUNTER — TELEPHONE (OUTPATIENT)
Dept: INTERNAL MEDICINE CLINIC | Facility: CLINIC | Age: 78
End: 2018-05-30

## 2018-05-30 ENCOUNTER — APPOINTMENT (OUTPATIENT)
Dept: PHYSICAL THERAPY | Facility: HOSPITAL | Age: 78
End: 2018-05-30
Attending: NURSE PRACTITIONER
Payer: MEDICARE

## 2018-05-30 ENCOUNTER — HOSPITAL ENCOUNTER (OUTPATIENT)
Age: 78
Discharge: HOME | End: 2018-05-30
Attending: FAMILY MEDICINE
Payer: MEDICARE

## 2018-05-30 VITALS
HEART RATE: 60 BPM | TEMPERATURE: 98 F | DIASTOLIC BLOOD PRESSURE: 63 MMHG | OXYGEN SATURATION: 96 % | RESPIRATION RATE: 18 BRPM | SYSTOLIC BLOOD PRESSURE: 152 MMHG

## 2018-05-30 DIAGNOSIS — N12 PYELONEPHRITIS: Primary | ICD-10-CM

## 2018-05-30 DIAGNOSIS — Z87.442 HISTORY OF KIDNEY STONES: ICD-10-CM

## 2018-05-30 PROCEDURE — 87086 URINE CULTURE/COLONY COUNT: CPT | Performed by: FAMILY MEDICINE

## 2018-05-30 PROCEDURE — 85025 COMPLETE CBC W/AUTO DIFF WBC: CPT | Performed by: FAMILY MEDICINE

## 2018-05-30 PROCEDURE — 99204 OFFICE O/P NEW MOD 45 MIN: CPT

## 2018-05-30 PROCEDURE — 81002 URINALYSIS NONAUTO W/O SCOPE: CPT | Performed by: FAMILY MEDICINE

## 2018-05-30 PROCEDURE — 80047 BASIC METABLC PNL IONIZED CA: CPT

## 2018-05-30 PROCEDURE — 96365 THER/PROPH/DIAG IV INF INIT: CPT

## 2018-05-30 PROCEDURE — 99215 OFFICE O/P EST HI 40 MIN: CPT

## 2018-05-30 RX ORDER — SODIUM CHLORIDE 9 MG/ML
INJECTION, SOLUTION INTRAVENOUS ONCE
Status: COMPLETED | OUTPATIENT
Start: 2018-05-30 | End: 2018-05-30

## 2018-05-30 RX ORDER — CIPROFLOXACIN 500 MG/1
500 TABLET, FILM COATED ORAL 2 TIMES DAILY
Qty: 20 TABLET | Refills: 0 | Status: SHIPPED | OUTPATIENT
Start: 2018-05-30 | End: 2018-06-09

## 2018-05-30 NOTE — ED INITIAL ASSESSMENT (HPI)
Pt c/o right sided back pain with urinary frequency that started today. Pt does have a hx of kidney stones from 15 years ago.

## 2018-05-30 NOTE — TELEPHONE ENCOUNTER
Spoke with pt indicating awoke with back pain in the AM. A couple hrs ago started with frequent urination with slight pain. Scheduled F/U OV tomorrow as requested w/ another partner. Pt verbalized understanding and agreed with POC.   CHESTER

## 2018-05-30 NOTE — ED PROVIDER NOTES
Patient Seen in: [facility]    History   Patient presents with:  Urinary Symptoms (urologic)    Stated Complaint: lower back pain, possible UTI X1 day    HPI  This is a 43-year-old female coming in with right flank pain rad Comment: w/adenoidectomy  No date: UPPER GI ENDOSCOPY,BIOPSY      Comment: 12-30-13 ron     Family history reviewed and is not pertinent to presenting problem.     Smoking status: Never Smoker Mixed Cells 1.1 (*)     All other components within normal limits   POCT ISTAT CHEM8 CARTRIDGE - Abnormal; Notable for the following:     ISTAT BUN 28 (*)     ISTAT Creatinine 1.30 (*)     ISTAT Glucose 105 (*)     All other components within normal limits of 5/30/2018  6:59 PM    START taking these medications    Ciprofloxacin HCl 500 MG Oral Tab  Take 1 tablet (500 mg total) by mouth 2 (two) times daily. , Normal, Disp-20 tablet, R-0

## 2018-05-30 NOTE — TELEPHONE ENCOUNTER
Patient woke up this morning with a bad back ache. The last couple of hours  having frequent urination with some pain. Would like to get in today or tomorrow with [place] or a call to advise what to do.     Has been on a heating pad all afternoon and is dell

## 2018-05-31 ENCOUNTER — OFFICE VISIT (OUTPATIENT)
Dept: INTERNAL MEDICINE CLINIC | Facility: CLINIC | Age: 78
End: 2018-05-31

## 2018-05-31 VITALS
BODY MASS INDEX: 25.1 KG/M2 | HEIGHT: 64 IN | TEMPERATURE: 98 F | HEART RATE: 68 BPM | SYSTOLIC BLOOD PRESSURE: 110 MMHG | DIASTOLIC BLOOD PRESSURE: 62 MMHG | WEIGHT: 147 LBS

## 2018-05-31 DIAGNOSIS — N10 ACUTE PYELONEPHRITIS: Primary | ICD-10-CM

## 2018-05-31 PROCEDURE — 99213 OFFICE O/P EST LOW 20 MIN: CPT | Performed by: INTERNAL MEDICINE

## 2018-05-31 NOTE — PROGRESS NOTES
Roslyn Poster  7/11/1940    Patient presents with:  Urgent Care F/u: LG. Room 13. UTI and back pain follow up. She was at urgent care last night.  She has not started the cipro yet      Missael Guerra is a 76year old female who presents with an Comment:CALCIUM   Past Medical History:   Diagnosis Date   • Acute sinusitis, unspecified    • Bulging discs    • Encounter for screening colonoscopy 4/8/2015    Dr.Gonzalo English    • Headache(784.0)    • Herpes zoster without mentio Smokeless tobacco: Never Used                      Alcohol use: Yes           4.2 oz/week     Glasses of wine: 7 per week     Comment: Cage done 3/9/18        OBJECTIVE:   /62 (BP Location: Left arm, Patient Position: Sitting, Cuff Size: adult)

## 2018-11-19 ENCOUNTER — TELEPHONE (OUTPATIENT)
Dept: INTERNAL MEDICINE CLINIC | Facility: CLINIC | Age: 78
End: 2018-11-19

## 2018-12-07 ENCOUNTER — NURSE ONLY (OUTPATIENT)
Dept: INTERNAL MEDICINE CLINIC | Facility: CLINIC | Age: 78
End: 2018-12-07
Payer: MEDICARE

## 2018-12-07 ENCOUNTER — TELEPHONE (OUTPATIENT)
Dept: INTERNAL MEDICINE CLINIC | Facility: CLINIC | Age: 78
End: 2018-12-07

## 2018-12-07 DIAGNOSIS — Z23 NEED FOR INFLUENZA VACCINATION: Primary | ICD-10-CM

## 2018-12-07 PROCEDURE — G0008 ADMIN INFLUENZA VIRUS VAC: HCPCS | Performed by: INTERNAL MEDICINE

## 2018-12-07 PROCEDURE — 90653 IIV ADJUVANT VACCINE IM: CPT | Performed by: INTERNAL MEDICINE

## 2018-12-07 RX ORDER — ESCITALOPRAM OXALATE 10 MG/1
10 TABLET ORAL DAILY
Qty: 90 TABLET | Refills: 0 | Status: SHIPPED | OUTPATIENT
Start: 2018-12-07 | End: 2019-04-19

## 2018-12-07 NOTE — TELEPHONE ENCOUNTER
LOV: 5/31/18 w/ AD acute, 4/16/18 w/ SD for f/u  FOV: None  Last labs: 5/30/18  Last Refill: 3/9/18 qt:90 3 refills     Per protocol sent to provider

## 2018-12-07 NOTE — TELEPHONE ENCOUNTER
Pt is now using a new pharmacy.  She would like refill for   escitalopram 10 MG Oral Tab   7/12/2018    Route:   (none)       To be sent to [place] Please advise

## 2018-12-07 NOTE — PROGRESS NOTES
Pt here for flu shot, tolerated well without difficulty in L arm. VIS given. ABN and Consent sent to scanning.

## 2018-12-18 ENCOUNTER — HOSPITAL ENCOUNTER (OUTPATIENT)
Age: 78
Discharge: HOME | End: 2018-12-18
Attending: FAMILY MEDICINE
Payer: MEDICARE

## 2018-12-18 ENCOUNTER — APPOINTMENT (OUTPATIENT)
Dept: GENERAL RADIOLOGY | Age: 78
End: 2018-12-18
Attending: FAMILY MEDICINE
Payer: MEDICARE

## 2018-12-18 VITALS
WEIGHT: 140 LBS | TEMPERATURE: 100 F | SYSTOLIC BLOOD PRESSURE: 148 MMHG | HEART RATE: 93 BPM | BODY MASS INDEX: 23.9 KG/M2 | DIASTOLIC BLOOD PRESSURE: 66 MMHG | RESPIRATION RATE: 14 BRPM | OXYGEN SATURATION: 97 % | HEIGHT: 64 IN

## 2018-12-18 DIAGNOSIS — J06.9 ACUTE URI: ICD-10-CM

## 2018-12-18 DIAGNOSIS — H65.199 ACUTE OTITIS MEDIA WITH EFFUSION: Primary | ICD-10-CM

## 2018-12-18 PROCEDURE — 71046 X-RAY EXAM CHEST 2 VIEWS: CPT | Performed by: FAMILY MEDICINE

## 2018-12-18 PROCEDURE — 99214 OFFICE O/P EST MOD 30 MIN: CPT

## 2018-12-18 PROCEDURE — 99213 OFFICE O/P EST LOW 20 MIN: CPT

## 2018-12-18 RX ORDER — AMOXICILLIN AND CLAVULANATE POTASSIUM 875; 125 MG/1; MG/1
1 TABLET, FILM COATED ORAL 2 TIMES DAILY
Qty: 10 TABLET | Refills: 0 | Status: SHIPPED | OUTPATIENT
Start: 2018-12-18 | End: 2018-12-23

## 2018-12-18 NOTE — ED INITIAL ASSESSMENT (HPI)
Pt c/o not feeling well. Pt states she has nasal congestion. Pt also c/o cough. Pt states she's been on amoxicillin for a week for a tooth issue. Pt states she had the flu shot 10 days ago.

## 2018-12-19 NOTE — ED PROVIDER NOTES
Patient Seen in: [facility]    History   Patient presents with:  Cough/URI    Stated Complaint: CONGESTION;COUGH X2 DAYS    HPI    25-year-old female presents with complaints of bilateral ear pain more to the right ear asso problem. Social History    Tobacco Use      Smoking status: Never Smoker      Smokeless tobacco: Never Used    Alcohol use:  Yes      Alcohol/week: 4.2 oz      Types: 7 Glasses of wine per week      Comment: Cage done 3/9/18    Drug use: No      Review o effusions. No pneumothorax. Hyperexpansion of the lungs. Degenerative changes of the thoracic spine. CONCLUSION:  Hyperexpansion of the lungs. No acute findings.      Dictated by: Arturo Roman MD on 12/18/2018 at 12:27     Approved by: Josue Brewer

## 2019-02-07 ENCOUNTER — TELEPHONE (OUTPATIENT)
Dept: INTERNAL MEDICINE CLINIC | Facility: CLINIC | Age: 79
End: 2019-02-07

## 2019-04-19 RX ORDER — ESCITALOPRAM OXALATE 10 MG/1
TABLET ORAL
Qty: 30 TABLET | Refills: 0 | Status: SHIPPED | OUTPATIENT
Start: 2019-04-19 | End: 2020-05-11

## 2019-04-19 NOTE — TELEPHONE ENCOUNTER
LFV: 5/31/18 with AD  Future Appt: none on file  Last Rx:12/7/18 for 90 days   Last Labs:4/9/18 cpe labs  Per protocol to SD, pt due for annual next month

## 2019-04-30 ENCOUNTER — TELEPHONE (OUTPATIENT)
Dept: INTERNAL MEDICINE CLINIC | Facility: CLINIC | Age: 79
End: 2019-04-30

## 2019-05-13 NOTE — TELEPHONE ENCOUNTER
Pt called and scheduled AWV   Future Appointments   Date Time Provider Audie RACHEL NP [facility]   5/30/2019  8:30 AM ANASTACIO Mcginnis [facility]

## 2019-05-21 ENCOUNTER — HOSPITAL ENCOUNTER (OUTPATIENT)
Dept: PHYSICAL THERAPY | Facility: HOSPITAL | Age: 79
Setting detail: THERAPIES SERIES
Discharge: HOME | End: 2019-05-21
Attending: INTERNAL MEDICINE
Payer: MEDICARE

## 2019-05-21 PROCEDURE — 97163 PT EVAL HIGH COMPLEX 45 MIN: CPT | Performed by: PHYSICAL THERAPIST

## 2019-05-21 PROCEDURE — 97110 THERAPEUTIC EXERCISES: CPT | Performed by: PHYSICAL THERAPIST

## 2019-05-21 NOTE — PROGRESS NOTES
POST-OP KNEE EVALUATION:   Referring Physician: Dr. 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 Functional limitations include limited with most ADL's, zabrina driving, getting in an out of bed, dressing , walking distance greater than two blocks.     PT is medi 0 deg to allow proper heel strike during gait and terminal knee extension in stance  · Pt will improve knee AROM flexion to >120 degrees to improve ability to perform stairs, getting dave and out of car, bath rub, tranfers   · Pt will improve quad strength

## 2019-05-24 ENCOUNTER — HOSPITAL ENCOUNTER (OUTPATIENT)
Dept: PHYSICAL THERAPY | Facility: HOSPITAL | Age: 79
Setting detail: THERAPIES SERIES
Discharge: HOME | End: 2019-05-24
Attending: INTERNAL MEDICINE
Payer: MEDICARE

## 2019-05-24 PROCEDURE — 97110 THERAPEUTIC EXERCISES: CPT | Performed by: PHYSICAL THERAPIST

## 2019-05-28 ENCOUNTER — HOSPITAL ENCOUNTER (OUTPATIENT)
Dept: PHYSICAL THERAPY | Facility: HOSPITAL | Age: 79
Setting detail: THERAPIES SERIES
Discharge: HOME | End: 2019-05-28
Attending: INTERNAL MEDICINE
Payer: MEDICARE

## 2019-05-28 PROCEDURE — 97110 THERAPEUTIC EXERCISES: CPT | Performed by: PHYSICAL THERAPIST

## 2019-05-28 NOTE — PROGRESS NOTES
Dx: Total knee replacement status, left (Y16.675)         Authorized # of Visits:  8         Next MD visit: none scheduled  Fall Risk: standard         Precautions: n/a             Subjective: Sometimes  it feels so stiff and sore .       Objective: Seen fo emphasis on flexion         Hip knee flex/extension onto step to incorporate fluid movement instead of hip hike  HIp hurdles x20 to emphasis on knee flexion. extension         shuttle x20 bilateral x1 zay 1 yellow   Single leg x20 1 blue  shuttle x20 bilat

## 2019-05-30 ENCOUNTER — OFFICE VISIT (OUTPATIENT)
Dept: INTERNAL MEDICINE CLINIC | Facility: CLINIC | Age: 79
End: 2019-05-30
Payer: MEDICARE

## 2019-05-30 VITALS
WEIGHT: 143 LBS | HEART RATE: 72 BPM | TEMPERATURE: 98 F | HEIGHT: 64 IN | RESPIRATION RATE: 16 BRPM | DIASTOLIC BLOOD PRESSURE: 68 MMHG | BODY MASS INDEX: 24.41 KG/M2 | SYSTOLIC BLOOD PRESSURE: 118 MMHG

## 2019-05-30 DIAGNOSIS — E78.5 DYSLIPIDEMIA: ICD-10-CM

## 2019-05-30 DIAGNOSIS — M47.818 ARTHRITIS OF SACROILIAC JOINT: ICD-10-CM

## 2019-05-30 DIAGNOSIS — F41.9 ANXIETY: ICD-10-CM

## 2019-05-30 DIAGNOSIS — I65.23 BILATERAL CAROTID ARTERY STENOSIS: ICD-10-CM

## 2019-05-30 DIAGNOSIS — M47.816 LUMBAR FACET ARTHROPATHY: ICD-10-CM

## 2019-05-30 DIAGNOSIS — D64.9 ANEMIA, UNSPECIFIED TYPE: ICD-10-CM

## 2019-05-30 DIAGNOSIS — M47.817 SPONDYLOSIS OF LUMBOSACRAL REGION WITHOUT MYELOPATHY OR RADICULOPATHY: ICD-10-CM

## 2019-05-30 DIAGNOSIS — J30.9 ALLERGIC RHINITIS, UNSPECIFIED SEASONALITY, UNSPECIFIED TRIGGER: ICD-10-CM

## 2019-05-30 DIAGNOSIS — E53.8 VITAMIN B 12 DEFICIENCY: ICD-10-CM

## 2019-05-30 DIAGNOSIS — R31.1 BENIGN ESSENTIAL MICROSCOPIC HEMATURIA: ICD-10-CM

## 2019-05-30 DIAGNOSIS — Z00.00 ENCOUNTER FOR ANNUAL HEALTH EXAMINATION: Primary | ICD-10-CM

## 2019-05-30 DIAGNOSIS — E55.9 VITAMIN D DEFICIENCY: ICD-10-CM

## 2019-05-30 DIAGNOSIS — Z12.31 ENCOUNTER FOR SCREENING MAMMOGRAM FOR MALIGNANT NEOPLASM OF BREAST: ICD-10-CM

## 2019-05-30 DIAGNOSIS — M85.88 OSTEOPENIA OF SPINE: ICD-10-CM

## 2019-05-30 DIAGNOSIS — IMO0002 OSTEOARTHROSIS INVOLVING LOWER LEG: ICD-10-CM

## 2019-05-30 PROBLEM — R20.8 ELECTRICAL SHOCK SENSATION: Status: RESOLVED | Noted: 2017-02-06 | Resolved: 2019-05-30

## 2019-05-30 PROCEDURE — 99214 OFFICE O/P EST MOD 30 MIN: CPT | Performed by: NURSE PRACTITIONER

## 2019-05-30 PROCEDURE — G0439 PPPS, SUBSEQ VISIT: HCPCS | Performed by: NURSE PRACTITIONER

## 2019-05-30 NOTE — PROGRESS NOTES
HPI:   Kavita Bowden is a [de-identified]year old female who presents for a Medicare Subsequent Annual Wellness visit (Pt already had Initial Annual Wellness). Vitamin B 12 deficiency  Not tolerating otc supplement. Not taking injections for some time.   Check lev Fall/Risk Scorin    Cognitive Assessment   She had a completely normal cognitive assessment- see flowsheet entries    Functional Ability/Status   Silvano Francis has some abnormal functions as listed below:  She has no issues with dressing and bathing ba knee replacement (TKR)     Primary osteoarthritis of right knee     Hematuria     S/P left knee arthroscopy     History of malignant neoplasm of skin     Dyslipidemia     Anxiety     Tendonitis of foot    Wt Readings from Last 3 Encounters:  05/30/19 : 143 colonoscopy (1/21/05, 04/08/2015); skin surgery; knee arthroscopy (Left, 10/21/16--Dr. Chan Singh); and knee surgery (Left, 04/01/2019).     Her family history includes Arthritis in her mother; Breast Cancer in her paternal aunt; Breast Cancer (age of onset: 80) i conjunctiva/corneas clear, EOM's intact both eyes   Ears:  Normal TM's and external ear canals, both ears   Nose: Nares normal, septum midline,mucosa normal, no drainage or sinus tenderness   Throat: Lips, mucosa, and tongue normal; teeth and gums normal Lashawn Mack is a [de-identified]year old female who presents for a Medicare Assessment. PLAN SUMMARY:   Diagnoses and all orders for this visit:    Encounter for annual health examination    Vitamin B 12 deficiency  Not tolerating otc supplement.    Not taking injectio diet, lifestyle, and exercise. No follow-ups on file.      ANASTACIO Baker, 5/30/2019     General Health     In the past six months, have you lost more than 10 pounds without trying?: 2 - No  Has your appetite been poor?: No  How does the patient main Pap+HPV every 5 yrs age 33-67, age 72 and older at high risk There are no preventive care reminders to display for this patient.  Update Health Maintenance if applicable    Chlamydia  Annually if high risk No results found for: CHLAMYDIA No flowsheet data f

## 2019-05-30 NOTE — PATIENT INSTRUCTIONS
Graciela Mckeon's SCREENING SCHEDULE   Tests on this list are recommended by your physician but may not be covered, or covered at this frequency, by your insurer. Please check with your insurance carrier before scheduling to verify coverage.    PREVENTATIVE criteria:   • Men who are 73-68 years old and have smoked more than 100 cigarettes in their lifetime   • Anyone with a family history    Colorectal Cancer Screening  Covered up to Age 76     Colonoscopy Screen   Covered every 10 years- more often if abnorm Influenza  Covered Annually No orders found for this or any previous visit.  Please get every year    Pneumococcal 13 (Prevnar)  Covered Once after 65 Orders placed or performed in visit on 02/08/17   • PNEUMOCOCCAL VACC, 13 RADHA IM    Please get once after

## 2019-05-31 ENCOUNTER — HOSPITAL ENCOUNTER (OUTPATIENT)
Dept: PHYSICAL THERAPY | Facility: HOSPITAL | Age: 79
Setting detail: THERAPIES SERIES
Discharge: HOME | End: 2019-05-31
Attending: INTERNAL MEDICINE
Payer: MEDICARE

## 2019-05-31 PROCEDURE — 97110 THERAPEUTIC EXERCISES: CPT | Performed by: PHYSICAL THERAPIST

## 2019-05-31 PROCEDURE — 97140 MANUAL THERAPY 1/> REGIONS: CPT | Performed by: PHYSICAL THERAPIST

## 2019-05-31 NOTE — PROGRESS NOTES
Dx: Total knee replacement status, left (P91.412)         Authorized # of Visits:  8         Next MD visit: none scheduled  Fall Risk: standard         Precautions: n/a             Subjective: Was walking the other day and twisted, felt a little sharp pain grass  · Pt will be independent and compliant with comprehensive HEP to maintain progress achieved in PT    Plan: Progress, emphasis no CKC and Extension .    Date: 5/24/2019 TX#: 2/8 Date:       5/28/19        TX#: 3/   Date:      5/30/19         TX#: 4/ D

## 2019-05-31 NOTE — ADDENDUM NOTE
Encounter addended by: Adrien Antonio PT on: 5/31/2019 4:30 PM   Actions taken: Charge Capture section accepted

## 2019-06-04 ENCOUNTER — HOSPITAL ENCOUNTER (OUTPATIENT)
Dept: PHYSICAL THERAPY | Facility: HOSPITAL | Age: 79
Setting detail: THERAPIES SERIES
Discharge: HOME | End: 2019-06-04
Attending: INTERNAL MEDICINE
Payer: MEDICARE

## 2019-06-04 PROCEDURE — 97140 MANUAL THERAPY 1/> REGIONS: CPT | Performed by: PHYSICAL THERAPIST

## 2019-06-04 PROCEDURE — 97110 THERAPEUTIC EXERCISES: CPT | Performed by: PHYSICAL THERAPIST

## 2019-06-04 NOTE — PROGRESS NOTES
Dx: Total knee replacement status, left (G94.578)         Authorized # of Visits:  8         Next MD visit: none scheduled  Fall Risk: standard         Precautions: n/a             Subjective: Very frustrated at how sore my knee is .   I thought it would be Balance board x2'each direction (4' total)  Balance board x3' each direction          Lunges onto step 8\" x20 with emphasis on flexion  Lunges onto step 8\" x20 with emphasis on flexion Lunges onto step 8\" x20 with emphasis on flexion TKE with red tb in

## 2019-06-07 ENCOUNTER — HOSPITAL ENCOUNTER (OUTPATIENT)
Dept: PHYSICAL THERAPY | Facility: HOSPITAL | Age: 79
Setting detail: THERAPIES SERIES
Discharge: HOME | End: 2019-06-07
Attending: INTERNAL MEDICINE
Payer: MEDICARE

## 2019-06-07 PROCEDURE — 97110 THERAPEUTIC EXERCISES: CPT | Performed by: PHYSICAL THERAPIST

## 2019-06-07 NOTE — PROGRESS NOTES
Dx: Total knee replacement status, left (C34.658)         Authorized # of Visits:  8         Next MD visit: none scheduled  Fall Risk: standard         Precautions: n/a             Subjective: Still not sleeping at night as well as I wish.   Want the pain a Balance board x3' each direction       Lunges onto step 8\" x20 with emphasis on flexion  Lunges onto step 8\" x20 with emphasis on flexion Lunges onto step 8\" x20 with emphasis on flexion TKE with red tb in  // bars x20  TKE with red tb in  // bars x2

## 2019-06-11 ENCOUNTER — HOSPITAL ENCOUNTER (OUTPATIENT)
Dept: PHYSICAL THERAPY | Facility: HOSPITAL | Age: 79
Setting detail: THERAPIES SERIES
Discharge: HOME | End: 2019-06-11
Attending: INTERNAL MEDICINE
Payer: MEDICARE

## 2019-06-11 PROCEDURE — 97110 THERAPEUTIC EXERCISES: CPT | Performed by: PHYSICAL THERAPIST

## 2019-06-11 NOTE — PROGRESS NOTES
Dx: Total knee replacement status, left (L04.266)         Authorized # of Visits:  8         Next MD visit: none scheduled  Fall Risk: standard         Precautions: n/a             Subjective: Still seems so stiff and sore  I just have to remember that it PROM left knee into flexion  Bike x10 '  Able to make full revolutions  Bike x10 '  Able to make full revolutions  NuStep x6' level 5     Balance board x2'each direction (4' total)  Balance board x2'each direction (4' total)  Balance board x2'each directio

## 2019-06-14 ENCOUNTER — APPOINTMENT (OUTPATIENT)
Dept: PHYSICAL THERAPY | Facility: HOSPITAL | Age: 79
End: 2019-06-14
Attending: INTERNAL MEDICINE
Payer: MEDICARE

## 2019-06-25 ENCOUNTER — HOSPITAL ENCOUNTER (OUTPATIENT)
Dept: PHYSICAL THERAPY | Facility: HOSPITAL | Age: 79
Setting detail: THERAPIES SERIES
Discharge: HOME | End: 2019-06-25
Attending: INTERNAL MEDICINE
Payer: MEDICARE

## 2019-06-25 PROCEDURE — 97110 THERAPEUTIC EXERCISES: CPT | Performed by: PHYSICAL THERAPIST

## 2019-06-25 PROCEDURE — 97016 VASOPNEUMATIC DEVICE THERAPY: CPT | Performed by: PHYSICAL THERAPIST

## 2019-06-25 PROCEDURE — 97140 MANUAL THERAPY 1/> REGIONS: CPT | Performed by: PHYSICAL THERAPIST

## 2019-06-25 NOTE — PROGRESS NOTES
Flex before 90 after 105   Ext -25  -20 after     Dx:  Total knee replacement status, left (H75.657)         Authorized # of Visits:  8         Next MD visit: none scheduled  Fall Risk: standard         Precautions: n/a             Subjective: Still seems s 6/11/19     TX#: 7/ Date:         6/25/19        TX#: 8/   Bike x5' (no revolution, just rocking )  Bike x5' (no revolution, just rocking )  PROM left knee into flexion  Bike x10 '  Able to make full revolutions  Bike x10 '  Able to make full revolutions step   Step ups, downs x15 4\" step    Toe taps on bosu  Step up s, downs x20 2\" step          Game ready for swelling x15'            Skilled Services: TE, education , PROM, CKC,     Charges:2TE     1 manual   Total Timed Treatment: 40 min  Total Treatme

## 2019-06-28 ENCOUNTER — HOSPITAL ENCOUNTER (OUTPATIENT)
Dept: PHYSICAL THERAPY | Facility: HOSPITAL | Age: 79
Setting detail: THERAPIES SERIES
Discharge: HOME | End: 2019-06-28
Attending: INTERNAL MEDICINE
Payer: MEDICARE

## 2019-06-28 PROCEDURE — 97110 THERAPEUTIC EXERCISES: CPT | Performed by: PHYSICAL THERAPIST

## 2019-06-28 PROCEDURE — 97140 MANUAL THERAPY 1/> REGIONS: CPT | Performed by: PHYSICAL THERAPIST

## 2019-06-28 NOTE — PROGRESS NOTES
Dx: Total knee replacement status, left (U03.807)         Authorized # of Visits:  8         Next MD visit: none scheduled  Fall Risk: standard         Precautions: n/a             Subjective: sleeping is still hard, cant find a comfortable position wit revolutions  NuStep x6' level 5  NuStep x6' level 5  NuStep x6' level 5    Balance board x2 'each direction (4' total)  Balance board x2'each direction (4' total)  Balance board x2'each direction (4' total)  Balance board x3' each direction     Balance boa Bosu lunges  Step up s, downs x20 2\" step with hand held support     Step ups 4\" steps x15   Step downs x10 2\" step   Step ups, downs x15 4\" step    Toe taps on bosu  Step up s, downs x20 2\" step            Game ready for swelling x15'

## 2019-07-02 ENCOUNTER — HOSPITAL ENCOUNTER (OUTPATIENT)
Dept: PHYSICAL THERAPY | Facility: HOSPITAL | Age: 79
Setting detail: THERAPIES SERIES
Discharge: HOME | End: 2019-07-02
Attending: INTERNAL MEDICINE
Payer: MEDICARE

## 2019-07-02 PROCEDURE — 97140 MANUAL THERAPY 1/> REGIONS: CPT | Performed by: PHYSICAL THERAPIST

## 2019-07-02 PROCEDURE — 97110 THERAPEUTIC EXERCISES: CPT | Performed by: PHYSICAL THERAPIST

## 2019-07-02 NOTE — PROGRESS NOTES
112 after stretching in supine and knee flex   guarding sig.    Dx: Total knee replacement status, left (N26.707)         Authorized # of Visits:  8         Next MD visit: none scheduled  Fall Risk: standard         Precautions: n/a             Subjective: 6/7/19      TX#: 6/ Date:            6/11/19     TX#: 7/ Date:         6/25/19        TX#: 8/ 6/28/19 7/2/19   Bike x5' (no revolution, just rocking )  Bike x5' (no revolution, just rocking )  PROM left knee into flexion  Bike x10 '  Able to make full revo Patellar stm to left knee   ROM into flexion in both sitting and supine with towel under knee to increase ROM  4 way leg kicks YTB x10 each and pole for support    PROM left knee PROM of left knee to increase ROM both flexion and extension into flexion/ext

## 2019-07-05 ENCOUNTER — HOSPITAL ENCOUNTER (OUTPATIENT)
Dept: PHYSICAL THERAPY | Facility: HOSPITAL | Age: 79
Setting detail: THERAPIES SERIES
Discharge: HOME | End: 2019-07-05
Attending: INTERNAL MEDICINE
Payer: MEDICARE

## 2019-07-05 PROCEDURE — 97140 MANUAL THERAPY 1/> REGIONS: CPT | Performed by: PHYSICAL THERAPIST

## 2019-07-05 PROCEDURE — 97110 THERAPEUTIC EXERCISES: CPT | Performed by: PHYSICAL THERAPIST

## 2019-07-05 NOTE — PROGRESS NOTES
112 after stretching in supine and knee flex   guarding sig.    Dx: Total knee replacement status, left (H56.434)         Authorized # of Visits:  8         Next MD visit: none scheduled  Fall Risk: standard         Precautions: n/a             Subjective:S revolution, just rocking )  Bike x5' (no revolution, just rocking )  PROM left knee into flexion  Bike x10 '  Able to make full revolutions  Bike x10 '  Able to make full revolutions  NuStep x6' level 5  NuStep x6' level 5  NuStep x6' level 5 NuStep x6' le ROM and zabrina.  Flexion  Patellar stm to left knee   ROM into flexion in both sitting and supine with towel under knee to increase ROM  4 way leg kicks YTB x10 each and pole for support  Supine:    PROM /Mobs of left knee into flexion , with use of towel unde

## 2019-07-09 ENCOUNTER — HOSPITAL ENCOUNTER (OUTPATIENT)
Dept: PHYSICAL THERAPY | Facility: HOSPITAL | Age: 79
Setting detail: THERAPIES SERIES
Discharge: HOME | End: 2019-07-09
Attending: INTERNAL MEDICINE
Payer: MEDICARE

## 2019-07-09 PROCEDURE — 97110 THERAPEUTIC EXERCISES: CPT | Performed by: PHYSICAL THERAPIST

## 2019-07-09 PROCEDURE — 97140 MANUAL THERAPY 1/> REGIONS: CPT | Performed by: PHYSICAL THERAPIST

## 2019-07-10 NOTE — PROGRESS NOTES
Dx: Total knee replacement status, left (H97.370)         Authorized # of Visits:  8         Next MD visit: none scheduled  Fall Risk: standard         Precautions: n/a             Subjective: Knee is feeling good.   Able to walk short distances without m rocking )  Bike x5' (no revolution, just rocking )  PROM left knee into flexion  Bike x10 '  Able to make full revolutions  Bike x10 '  Able to make full revolutions  NuStep x6' level 5  NuStep x6' level 5  NuStep x6' level 5 NuStep x6' level 5 NuStep x6' x20 4\" with cues  Step up step down x20 4\" with cues  Step up step down x20 4\" with cues   Lateral step ups    PROM left knee to increase ROM and zabrina.  Flexion  Patellar stm to left knee   ROM into flexion in both sitting and supine with towel under knee

## 2019-07-12 ENCOUNTER — HOSPITAL ENCOUNTER (EMERGENCY)
Facility: HOSPITAL | Age: 79
Discharge: HOME | End: 2019-07-12
Attending: EMERGENCY MEDICINE
Payer: MEDICARE

## 2019-07-12 ENCOUNTER — APPOINTMENT (OUTPATIENT)
Dept: ULTRASOUND IMAGING | Facility: HOSPITAL | Age: 79
End: 2019-07-12
Attending: PHYSICIAN ASSISTANT
Payer: MEDICARE

## 2019-07-12 ENCOUNTER — TELEPHONE (OUTPATIENT)
Dept: INTERNAL MEDICINE CLINIC | Facility: CLINIC | Age: 79
End: 2019-07-12

## 2019-07-12 ENCOUNTER — HOSPITAL ENCOUNTER (OUTPATIENT)
Dept: PHYSICAL THERAPY | Facility: HOSPITAL | Age: 79
Setting detail: THERAPIES SERIES
Discharge: HOME | End: 2019-07-12
Attending: INTERNAL MEDICINE
Payer: MEDICARE

## 2019-07-12 ENCOUNTER — APPOINTMENT (OUTPATIENT)
Dept: GENERAL RADIOLOGY | Facility: HOSPITAL | Age: 79
End: 2019-07-12
Attending: PHYSICIAN ASSISTANT
Payer: MEDICARE

## 2019-07-12 VITALS
RESPIRATION RATE: 16 BRPM | TEMPERATURE: 99 F | OXYGEN SATURATION: 100 % | DIASTOLIC BLOOD PRESSURE: 81 MMHG | SYSTOLIC BLOOD PRESSURE: 193 MMHG | WEIGHT: 145 LBS | BODY MASS INDEX: 24.75 KG/M2 | HEIGHT: 64 IN | HEART RATE: 57 BPM

## 2019-07-12 DIAGNOSIS — M71.22 SYNOVIAL CYST OF LEFT POPLITEAL SPACE: Primary | ICD-10-CM

## 2019-07-12 LAB
ANION GAP SERPL CALC-SCNC: 3 MMOL/L (ref 0–18)
BASOPHILS # BLD AUTO: 0.05 X10(3) UL (ref 0–0.2)
BASOPHILS NFR BLD AUTO: 0.8 %
BUN BLD-MCNC: 19 MG/DL (ref 7–18)
BUN/CREAT SERPL: 28.8 (ref 10–20)
CALCIUM BLD-MCNC: 9.4 MG/DL (ref 8.5–10.1)
CHLORIDE SERPL-SCNC: 109 MMOL/L (ref 98–112)
CO2 SERPL-SCNC: 25 MMOL/L (ref 21–32)
CREAT BLD-MCNC: 0.66 MG/DL (ref 0.55–1.02)
CRP SERPL-MCNC: <0.29 MG/DL (ref ?–0.3)
DEPRECATED RDW RBC AUTO: 54.4 FL (ref 35.1–46.3)
EOSINOPHIL # BLD AUTO: 0.12 X10(3) UL (ref 0–0.7)
EOSINOPHIL NFR BLD AUTO: 1.9 %
ERYTHROCYTE [DISTWIDTH] IN BLOOD BY AUTOMATED COUNT: 16.7 % (ref 11–15)
GLUCOSE BLD-MCNC: 73 MG/DL (ref 70–99)
HCT VFR BLD AUTO: 42.6 % (ref 35–48)
HGB BLD-MCNC: 13.8 G/DL (ref 12–16)
IMM GRANULOCYTES # BLD AUTO: 0.01 X10(3) UL (ref 0–1)
IMM GRANULOCYTES NFR BLD: 0.2 %
LYMPHOCYTES # BLD AUTO: 2.5 X10(3) UL (ref 1–4)
LYMPHOCYTES NFR BLD AUTO: 39.9 %
MCH RBC QN AUTO: 28.9 PG (ref 26–34)
MCHC RBC AUTO-ENTMCNC: 32.4 G/DL (ref 31–37)
MCV RBC AUTO: 89.3 FL (ref 80–100)
MONOCYTES # BLD AUTO: 0.47 X10(3) UL (ref 0.1–1)
MONOCYTES NFR BLD AUTO: 7.5 %
NEUTROPHILS # BLD AUTO: 3.11 X10 (3) UL (ref 1.5–7.7)
NEUTROPHILS # BLD AUTO: 3.11 X10(3) UL (ref 1.5–7.7)
NEUTROPHILS NFR BLD AUTO: 49.7 %
OSMOLALITY SERPL CALC.SUM OF ELEC: 285 MOSM/KG (ref 275–295)
PLATELET # BLD AUTO: 200 10(3)UL (ref 150–450)
POTASSIUM SERPL-SCNC: 4 MMOL/L (ref 3.5–5.1)
PROCALCITONIN SERPL-MCNC: <0.02 NG/ML
RBC # BLD AUTO: 4.77 X10(6)UL (ref 3.8–5.3)
SODIUM SERPL-SCNC: 137 MMOL/L (ref 136–145)
WBC # BLD AUTO: 6.3 X10(3) UL (ref 4–11)

## 2019-07-12 PROCEDURE — 80048 BASIC METABOLIC PNL TOTAL CA: CPT | Performed by: PHYSICIAN ASSISTANT

## 2019-07-12 PROCEDURE — 99284 EMERGENCY DEPT VISIT MOD MDM: CPT | Performed by: EMERGENCY MEDICINE

## 2019-07-12 PROCEDURE — 96365 THER/PROPH/DIAG IV INF INIT: CPT | Performed by: EMERGENCY MEDICINE

## 2019-07-12 PROCEDURE — 93971 EXTREMITY STUDY: CPT | Performed by: PHYSICIAN ASSISTANT

## 2019-07-12 PROCEDURE — 84145 PROCALCITONIN (PCT): CPT | Performed by: EMERGENCY MEDICINE

## 2019-07-12 PROCEDURE — 86140 C-REACTIVE PROTEIN: CPT | Performed by: EMERGENCY MEDICINE

## 2019-07-12 PROCEDURE — 73560 X-RAY EXAM OF KNEE 1 OR 2: CPT | Performed by: PHYSICIAN ASSISTANT

## 2019-07-12 PROCEDURE — 85025 COMPLETE CBC W/AUTO DIFF WBC: CPT | Performed by: PHYSICIAN ASSISTANT

## 2019-07-12 RX ORDER — NAPROXEN 500 MG/1
500 TABLET ORAL 2 TIMES DAILY PRN
Qty: 20 TABLET | Refills: 0 | Status: SHIPPED | OUTPATIENT
Start: 2019-07-12 | End: 2019-07-19

## 2019-07-12 NOTE — ED PROVIDER NOTES
Patient Seen in: [facility] Emergency Department    History   Patient presents with:  Postop/Procedure Problem    Stated Complaint: psot op knee swelling    HPI    Patient is a 12-year-old female.   She underwent total knee replacement to the left lo INJECTION BILATERAL Bilateral 2/11/2014    Performed by Lugenia Cowden, MD at [facility] MAIN OR   • SKIN SURGERY      [address] - face & R dorsal hand (treated by outside [facility] physician)   • TONSILLECTOMY      w/adenoidectomy   • UPPER GI ENDOSCOPY,BIOPSY      12-30-1 PANEL (8) - Abnormal; Notable for the following components:       Result Value    BUN 19 (*)     BUN/CREA Ratio 28.8 (*)     All other components within normal limits   CBC W/ DIFFERENTIAL - Abnormal; Notable for the following components:    RDW 16.7 (*) medications    naproxen 500 MG Oral Tab  Take 1 tablet (500 mg total) by mouth 2 (two) times daily as needed.   Qty: 20 tablet Refills: 0

## 2019-07-12 NOTE — ED PROVIDER NOTES
The patient's case was discussed with me by physician's assistant Ying Garcia. I interviewed and examined the patient.   She has had increased pain and reported swelling over the past couple of days however the knee wound looks clean and other than a ve

## 2019-07-12 NOTE — TELEPHONE ENCOUNTER
CHESTER Carpenter Pt is having left knee pain, warm, and very tender to touch. PT will be sending her to the ED.

## 2019-07-22 NOTE — PROGRESS NOTES
Dx: Total knee replacement status, left (J67.876)         Authorized # of Visits:  8         Next MD visit: none scheduled  Fall Risk: standard         Precautions: n/a             Subjective: Knee is very sore today, feels like its really swollen and te strength to grossly 4+/5 to be able to get up and down from the floor safely  · Pt will demonstrate increased hip ER/ABD strength to 5/5 to perform stepping and squatting activities without excessive femoral IR/ADD  · Pt will improve SLS to >15s to improve x20 4\"   ;lateral step ups x20 4'  Step ups, downs x20 4\"   ;lateral step ups x20 4'    Hip knee flex/extension onto step to incorporate fluid movement instead of hip hike  Hip knee flex/extension onto step to incorporate fluid movement instead of hip hi flexion  Sitting:    Contract relax to bend knee into flexion    Step ups 4\" steps x15   Step downs x10 2\" step   Step ups, downs x15 4\" step    Toe taps on bosu  Step up s, downs x20 2\" step     STM TO Left knee, joint line .          Game ready for sw

## 2019-07-23 ENCOUNTER — APPOINTMENT (OUTPATIENT)
Dept: PHYSICAL THERAPY | Facility: HOSPITAL | Age: 79
End: 2019-07-23
Attending: INTERNAL MEDICINE
Payer: MEDICARE

## 2019-07-24 ENCOUNTER — OFFICE VISIT (OUTPATIENT)
Dept: INTERNAL MEDICINE CLINIC | Facility: CLINIC | Age: 79
End: 2019-07-24
Payer: MEDICARE

## 2019-07-24 VITALS
BODY MASS INDEX: 24.75 KG/M2 | TEMPERATURE: 98 F | SYSTOLIC BLOOD PRESSURE: 118 MMHG | HEART RATE: 76 BPM | WEIGHT: 145 LBS | DIASTOLIC BLOOD PRESSURE: 64 MMHG | HEIGHT: 64 IN

## 2019-07-24 DIAGNOSIS — M25.562 ACUTE PAIN OF LEFT KNEE: Primary | ICD-10-CM

## 2019-07-24 DIAGNOSIS — R19.4 FREQUENT BOWEL MOVEMENTS: ICD-10-CM

## 2019-07-24 DIAGNOSIS — F41.9 ANXIETY: ICD-10-CM

## 2019-07-24 PROCEDURE — 99214 OFFICE O/P EST MOD 30 MIN: CPT | Performed by: NURSE PRACTITIONER

## 2019-07-24 NOTE — PROGRESS NOTES
Kasi Diaz is a 78year old female. Patient presents with: Other: LG. Room 11.  Bakers cyst follow up behind left knee   Change of Bowel Habits: Haaving 4-5 bowel movements a day mainly after meals      HPI:   Here for f/u bakers cyst and change in bm Rfl:       Past Medical History:   Diagnosis Date   • Acute sinusitis, unspecified    • Bulging discs    • Encounter for screening colonoscopy 4/8/2015    Dr.Gonzalo English    • Headache(784.0)    • Herpes zoster without mention of complication    • Lumb yogurt daily. Call with unresolved symptoms. Anxiety  Cont lexapro. No orders of the defined types were placed in this encounter.       Meds & Refills for this Visit:  Requested Prescriptions      No prescriptions requested or ordered in this encoun

## 2019-08-07 ENCOUNTER — HOSPITAL ENCOUNTER (OUTPATIENT)
Dept: LAB | Facility: HOSPITAL | Age: 79
Discharge: HOME | End: 2019-08-07
Attending: NURSE PRACTITIONER
Payer: MEDICARE

## 2019-08-07 ENCOUNTER — TELEPHONE (OUTPATIENT)
Dept: INTERNAL MEDICINE CLINIC | Facility: CLINIC | Age: 79
End: 2019-08-07

## 2019-08-07 DIAGNOSIS — R31.1 BENIGN ESSENTIAL MICROSCOPIC HEMATURIA: Primary | ICD-10-CM

## 2019-08-07 DIAGNOSIS — E55.9 VITAMIN D DEFICIENCY: ICD-10-CM

## 2019-08-07 DIAGNOSIS — D64.9 ANEMIA, UNSPECIFIED TYPE: ICD-10-CM

## 2019-08-07 DIAGNOSIS — E55.9 VITAMIN D DEFICIENCY: Primary | ICD-10-CM

## 2019-08-07 DIAGNOSIS — E78.5 DYSLIPIDEMIA: ICD-10-CM

## 2019-08-07 DIAGNOSIS — R31.1 BENIGN ESSENTIAL MICROSCOPIC HEMATURIA: ICD-10-CM

## 2019-08-07 DIAGNOSIS — E53.8 VITAMIN B 12 DEFICIENCY: ICD-10-CM

## 2019-08-07 LAB
ALBUMIN SERPL-MCNC: 3.6 G/DL (ref 3.4–5)
ALBUMIN/GLOB SERPL: 1.1 {RATIO} (ref 1–2)
ALP LIVER SERPL-CCNC: 89 U/L (ref 55–142)
ALT SERPL-CCNC: 20 U/L (ref 13–56)
ANION GAP SERPL CALC-SCNC: 7 MMOL/L (ref 0–18)
AST SERPL-CCNC: 20 U/L (ref 15–37)
BASOPHILS # BLD AUTO: 0.06 X10(3) UL (ref 0–0.2)
BASOPHILS NFR BLD AUTO: 1 %
BILIRUB SERPL-MCNC: 0.6 MG/DL (ref 0.1–2)
BILIRUB UR QL STRIP.AUTO: NEGATIVE
BUN BLD-MCNC: 23 MG/DL (ref 7–18)
BUN/CREAT SERPL: 31.1 (ref 10–20)
CALCIUM BLD-MCNC: 9.6 MG/DL (ref 8.5–10.1)
CHLORIDE SERPL-SCNC: 109 MMOL/L (ref 98–112)
CHOLEST SMN-MCNC: 288 MG/DL (ref ?–200)
CO2 SERPL-SCNC: 23 MMOL/L (ref 21–32)
COLOR UR AUTO: YELLOW
CREAT BLD-MCNC: 0.74 MG/DL (ref 0.55–1.02)
DEPRECATED RDW RBC AUTO: 53.8 FL (ref 35.1–46.3)
EOSINOPHIL # BLD AUTO: 0.14 X10(3) UL (ref 0–0.7)
EOSINOPHIL NFR BLD AUTO: 2.4 %
ERYTHROCYTE [DISTWIDTH] IN BLOOD BY AUTOMATED COUNT: 16.6 % (ref 11–15)
GLOBULIN PLAS-MCNC: 3.3 G/DL (ref 2.8–4.4)
GLUCOSE BLD-MCNC: 88 MG/DL (ref 70–99)
GLUCOSE UR STRIP.AUTO-MCNC: NEGATIVE MG/DL
HCT VFR BLD AUTO: 43.9 % (ref 35–48)
HDLC SERPL-MCNC: 134 MG/DL (ref 40–59)
HGB BLD-MCNC: 14.3 G/DL (ref 12–16)
HYALINE CASTS #/AREA URNS AUTO: PRESENT /LPF
IMM GRANULOCYTES # BLD AUTO: 0.01 X10(3) UL (ref 0–1)
IMM GRANULOCYTES NFR BLD: 0.2 %
KETONES UR STRIP.AUTO-MCNC: NEGATIVE MG/DL
LDLC SERPL CALC-MCNC: 139 MG/DL (ref ?–100)
LYMPHOCYTES # BLD AUTO: 2.42 X10(3) UL (ref 1–4)
LYMPHOCYTES NFR BLD AUTO: 41.2 %
M PROTEIN MFR SERPL ELPH: 6.9 G/DL (ref 6.4–8.2)
MCH RBC QN AUTO: 28.8 PG (ref 26–34)
MCHC RBC AUTO-ENTMCNC: 32.6 G/DL (ref 31–37)
MCV RBC AUTO: 88.3 FL (ref 80–100)
MONOCYTES # BLD AUTO: 0.38 X10(3) UL (ref 0.1–1)
MONOCYTES NFR BLD AUTO: 6.5 %
NEUTROPHILS # BLD AUTO: 2.87 X10 (3) UL (ref 1.5–7.7)
NEUTROPHILS # BLD AUTO: 2.87 X10(3) UL (ref 1.5–7.7)
NEUTROPHILS NFR BLD AUTO: 48.7 %
NITRITE UR QL STRIP.AUTO: NEGATIVE
NONHDLC SERPL-MCNC: 154 MG/DL (ref ?–130)
OSMOLALITY SERPL CALC.SUM OF ELEC: 291 MOSM/KG (ref 275–295)
PH UR STRIP.AUTO: 7 [PH] (ref 4.5–8)
PLATELET # BLD AUTO: 197 10(3)UL (ref 150–450)
POTASSIUM SERPL-SCNC: 4.1 MMOL/L (ref 3.5–5.1)
PROT UR STRIP.AUTO-MCNC: NEGATIVE MG/DL
RBC # BLD AUTO: 4.97 X10(6)UL (ref 3.8–5.3)
SODIUM SERPL-SCNC: 139 MMOL/L (ref 136–145)
SP GR UR STRIP.AUTO: 1.01 (ref 1–1.03)
TRIGL SERPL-MCNC: 77 MG/DL (ref 30–149)
UROBILINOGEN UR STRIP.AUTO-MCNC: <2 MG/DL
VIT B12 SERPL-MCNC: 308 PG/ML (ref 193–986)
VIT D+METAB SERPL-MCNC: 13.8 NG/ML (ref 30–100)
VLDLC SERPL CALC-MCNC: 15 MG/DL (ref 0–30)
WBC # BLD AUTO: 5.9 X10(3) UL (ref 4–11)

## 2019-08-07 PROCEDURE — 85025 COMPLETE CBC W/AUTO DIFF WBC: CPT

## 2019-08-07 PROCEDURE — 82607 VITAMIN B-12: CPT

## 2019-08-07 PROCEDURE — 80053 COMPREHEN METABOLIC PANEL: CPT

## 2019-08-07 PROCEDURE — 82306 VITAMIN D 25 HYDROXY: CPT

## 2019-08-07 PROCEDURE — 36415 COLL VENOUS BLD VENIPUNCTURE: CPT

## 2019-08-07 PROCEDURE — 80061 LIPID PANEL: CPT

## 2019-08-07 PROCEDURE — 81001 URINALYSIS AUTO W/SCOPE: CPT

## 2019-08-07 RX ORDER — ERGOCALCIFEROL 1.25 MG/1
50000 CAPSULE ORAL WEEKLY
Qty: 12 CAPSULE | Refills: 0 | Status: SHIPPED | OUTPATIENT
Start: 2019-08-07 | End: 2019-08-16

## 2019-08-07 NOTE — TELEPHONE ENCOUNTER
The Urine culture was cxl due to cross contamination. New order will need to be placed and please contact pt. No call back to her is necessary.

## 2019-08-09 ENCOUNTER — APPOINTMENT (OUTPATIENT)
Dept: LAB | Age: 79
End: 2019-08-09
Attending: NURSE PRACTITIONER
Payer: MEDICARE

## 2019-08-09 DIAGNOSIS — R31.1 BENIGN ESSENTIAL MICROSCOPIC HEMATURIA: ICD-10-CM

## 2019-08-09 LAB
BILIRUB UR QL STRIP.AUTO: NEGATIVE
COLOR UR AUTO: YELLOW
GLUCOSE UR STRIP.AUTO-MCNC: NEGATIVE MG/DL
KETONES UR STRIP.AUTO-MCNC: NEGATIVE MG/DL
NITRITE UR QL STRIP.AUTO: NEGATIVE
PH UR STRIP.AUTO: 7 [PH] (ref 4.5–8)
PROT UR STRIP.AUTO-MCNC: NEGATIVE MG/DL
RBC UR QL AUTO: NEGATIVE
SP GR UR STRIP.AUTO: 1.02 (ref 1–1.03)
UROBILINOGEN UR STRIP.AUTO-MCNC: <2 MG/DL

## 2019-08-09 PROCEDURE — 81001 URINALYSIS AUTO W/SCOPE: CPT

## 2019-08-15 ENCOUNTER — OFFICE VISIT (OUTPATIENT)
Dept: PODIATRY CLINIC | Facility: CLINIC | Age: 79
End: 2019-08-15
Payer: MEDICARE

## 2019-08-15 VITALS — DIASTOLIC BLOOD PRESSURE: 69 MMHG | RESPIRATION RATE: 14 BRPM | SYSTOLIC BLOOD PRESSURE: 138 MMHG | HEART RATE: 68 BPM

## 2019-08-15 DIAGNOSIS — M79.672 FOOT PAIN, LEFT: ICD-10-CM

## 2019-08-15 DIAGNOSIS — M19.079 ARTHRITIS OF MIDFOOT: Primary | ICD-10-CM

## 2019-08-15 PROCEDURE — 20600 DRAIN/INJ JOINT/BURSA W/O US: CPT | Performed by: PODIATRIST

## 2019-08-15 PROCEDURE — 99203 OFFICE O/P NEW LOW 30 MIN: CPT | Performed by: PODIATRIST

## 2019-08-15 RX ORDER — IBUPROFEN 200 MG
200 TABLET ORAL EVERY 6 HOURS PRN
COMMUNITY

## 2019-08-15 RX ORDER — TRIAMCINOLONE ACETONIDE 40 MG/ML
40 INJECTION, SUSPENSION INTRA-ARTICULAR; INTRAMUSCULAR ONCE
Status: COMPLETED | OUTPATIENT
Start: 2019-08-15 | End: 2019-08-15

## 2019-08-15 RX ADMIN — TRIAMCINOLONE ACETONIDE 40 MG: 40 INJECTION, SUSPENSION INTRA-ARTICULAR; INTRAMUSCULAR at 10:50:00

## 2019-08-15 NOTE — PROGRESS NOTES
Lev Li is a 78year old female. Patient presents with:  Consult: left foot pain - pt brought xray cd. pt had left knee replacement in april 2019. PT recommended pt see Dr Elizabeth Albright.  after pushing off a board in PT, pt exp pain and has had issues with history of other malignant neoplasm of skin 3/23/2016    Hx BCC x2   • Rash and other nonspecific skin eruption    • Toxoplasmosis 1982   • Unspecified vitamin D deficiency       Past Surgical History:   Procedure Laterality Date   • APPENDECTOMY     • COL REVIEW OF SYSTEMS:   Review of Systems  Today reviewed systens as documented below  GENERAL HEALTH: feels well otherwise  SKIN: denies any unusual skin lesions or rashes  RESPIRATORY: denies shortness of breath with exertion  CARDIOVASCULAR: denies barbie The patient indicates understanding of these issues and agrees to the plan. Return in about 3 weeks (around 9/5/2019).     Sade Dyson DPM  8/15/2019

## 2019-08-16 RX ORDER — ESCITALOPRAM OXALATE 10 MG/1
TABLET ORAL
Qty: 90 TABLET | Refills: 0 | Status: SHIPPED | OUTPATIENT
Start: 2019-08-16 | End: 2019-08-22

## 2019-08-16 RX ORDER — ERGOCALCIFEROL 1.25 MG/1
CAPSULE ORAL
Qty: 12 CAPSULE | Refills: 0 | Status: SHIPPED | OUTPATIENT
Start: 2019-08-16 | End: 2019-08-22 | Stop reason: ALTCHOICE

## 2019-08-16 NOTE — TELEPHONE ENCOUNTER
Last Ov: 7/24/19, SD, acute  Upcoming appt: no upcoming appt  Last labs: CMP, Lipid, Vit B12, CBC, Vit D UA w cx 8/7/19  Last Rx:   Ergocalciferol 49060RG, #12, 0R 8/7/19  Escitalopram 10mg, #30, 0R 4/19/19    Per Protocol, neither on protocol. Pending to pharmacy.

## 2019-08-21 ENCOUNTER — HOSPITAL ENCOUNTER (OUTPATIENT)
Dept: MAMMOGRAPHY | Age: 79
Discharge: HOME | End: 2019-08-21
Attending: NURSE PRACTITIONER
Payer: MEDICARE

## 2019-08-21 DIAGNOSIS — Z12.31 ENCOUNTER FOR SCREENING MAMMOGRAM FOR MALIGNANT NEOPLASM OF BREAST: ICD-10-CM

## 2019-08-21 PROCEDURE — 77063 BREAST TOMOSYNTHESIS BI: CPT | Performed by: NURSE PRACTITIONER

## 2019-08-21 PROCEDURE — 77067 SCR MAMMO BI INCL CAD: CPT | Performed by: NURSE PRACTITIONER

## 2019-08-22 ENCOUNTER — HOSPITAL ENCOUNTER (OUTPATIENT)
Dept: CT IMAGING | Facility: HOSPITAL | Age: 79
Discharge: HOME | End: 2019-08-22
Attending: PHYSICIAN ASSISTANT
Payer: MEDICARE

## 2019-08-22 ENCOUNTER — TELEPHONE (OUTPATIENT)
Dept: INTERNAL MEDICINE CLINIC | Facility: CLINIC | Age: 79
End: 2019-08-22

## 2019-08-22 ENCOUNTER — OFFICE VISIT (OUTPATIENT)
Dept: INTERNAL MEDICINE CLINIC | Facility: CLINIC | Age: 79
End: 2019-08-22
Payer: MEDICARE

## 2019-08-22 VITALS
HEIGHT: 64 IN | WEIGHT: 147 LBS | SYSTOLIC BLOOD PRESSURE: 138 MMHG | BODY MASS INDEX: 25.1 KG/M2 | DIASTOLIC BLOOD PRESSURE: 68 MMHG | HEART RATE: 76 BPM | TEMPERATURE: 98 F

## 2019-08-22 DIAGNOSIS — R19.5 LOOSE STOOLS: ICD-10-CM

## 2019-08-22 DIAGNOSIS — E55.9 VITAMIN D DEFICIENCY: ICD-10-CM

## 2019-08-22 DIAGNOSIS — R19.5 DARK STOOLS: ICD-10-CM

## 2019-08-22 DIAGNOSIS — R93.5 ABNORMAL CT OF THE ABDOMEN: ICD-10-CM

## 2019-08-22 DIAGNOSIS — R42 DIZZINESS: ICD-10-CM

## 2019-08-22 DIAGNOSIS — R10.32 LLQ PAIN: ICD-10-CM

## 2019-08-22 DIAGNOSIS — R10.32 LLQ PAIN: Primary | ICD-10-CM

## 2019-08-22 PROCEDURE — 74177 CT ABD & PELVIS W/CONTRAST: CPT | Performed by: PHYSICIAN ASSISTANT

## 2019-08-22 PROCEDURE — 99214 OFFICE O/P EST MOD 30 MIN: CPT | Performed by: PHYSICIAN ASSISTANT

## 2019-08-22 PROCEDURE — 93000 ELECTROCARDIOGRAM COMPLETE: CPT | Performed by: PHYSICIAN ASSISTANT

## 2019-08-22 NOTE — TELEPHONE ENCOUNTER
pt stating after she takes her meds in the AM she gets dizzy and her feet and hands get tingly-can someone triage dizziness-sent to triage-call her on cell number [phone]

## 2019-08-22 NOTE — TELEPHONE ENCOUNTER
Patient states for the past week or so, 3 times only pt has dizziness, numbness and tingling in her hands after taking her morning medications lasting about 4-5 minutes and goes away. Appt scheduled for pt with CB for 8/22/19 to discuss s/s.   Pt verbalize

## 2019-08-22 NOTE — PROGRESS NOTES
Patient presents with:  Tingling: LG. Room 2. Tingling and numbness in her hands   Dizziness: Not with rapid movement.  Mainly when standing up       HPI:  Pt presents with c/o 3 episodes of dizziness associated with numbness and tingling in her hands and f disease [facility])     Personal history of other malignant neoplasm of skin     History of total left knee replacement (TKR)     Primary osteoarthritis of right knee     Hematuria     S/P left knee arthroscopy     History of malignant neoplasm of skin     Dysli negative. Check labs, carotid US and holter monitor. Close f/u. Return in 1 week. Loose stools - Unclear etiology but need to r/o diverticulitis/colitis. CT as above. Dark stools  Hemoccult negative on exam today. Stop Advil. On PPI. Check CBC.   Geovanna Song

## 2019-08-23 ENCOUNTER — TELEPHONE (OUTPATIENT)
Dept: INTERNAL MEDICINE CLINIC | Facility: CLINIC | Age: 79
End: 2019-08-23

## 2019-08-23 NOTE — TELEPHONE ENCOUNTER
Patient remembered and wanted CB to know she forgot to mention she had a calcium deposit removed from her bladder in 1961, reviewed US Abdomen/Pelvis with her, discussed there was no finding in her bladder(added to Medical history.   Pt notified the [facility] [id number]

## 2019-08-23 NOTE — PROGRESS NOTES
I called pt personally and reviewed results with her. She has long hx of kidney stones and bladder infections. Those were treated on the [facility] before moving to [place].   We will need to have her sign a records release and try to obtain records t

## 2019-08-28 ENCOUNTER — HOSPITAL ENCOUNTER (OUTPATIENT)
Dept: MAMMOGRAPHY | Facility: HOSPITAL | Age: 79
Discharge: HOME | End: 2019-08-28
Attending: NURSE PRACTITIONER
Payer: MEDICARE

## 2019-08-28 DIAGNOSIS — R92.2 INCONCLUSIVE MAMMOGRAM: ICD-10-CM

## 2019-08-28 PROCEDURE — 76641 ULTRASOUND BREAST COMPLETE: CPT | Performed by: NURSE PRACTITIONER

## 2019-08-28 PROCEDURE — 77061 BREAST TOMOSYNTHESIS UNI: CPT | Performed by: NURSE PRACTITIONER

## 2019-08-28 PROCEDURE — 77065 DX MAMMO INCL CAD UNI: CPT | Performed by: NURSE PRACTITIONER

## 2019-08-31 ENCOUNTER — HOSPITAL ENCOUNTER (OUTPATIENT)
Dept: CV DIAGNOSTICS | Facility: HOSPITAL | Age: 79
Discharge: HOME | End: 2019-08-31
Attending: PHYSICIAN ASSISTANT
Payer: MEDICARE

## 2019-08-31 ENCOUNTER — HOSPITAL ENCOUNTER (OUTPATIENT)
Dept: ULTRASOUND IMAGING | Facility: HOSPITAL | Age: 79
Discharge: HOME | End: 2019-08-31
Attending: PHYSICIAN ASSISTANT
Payer: MEDICARE

## 2019-08-31 DIAGNOSIS — R42 DIZZINESS: ICD-10-CM

## 2019-08-31 PROCEDURE — 93225 XTRNL ECG REC<48 HRS REC: CPT | Performed by: PHYSICIAN ASSISTANT

## 2019-08-31 PROCEDURE — 93227 XTRNL ECG REC<48 HR R&I: CPT | Performed by: PHYSICIAN ASSISTANT

## 2019-08-31 PROCEDURE — 93880 EXTRACRANIAL BILAT STUDY: CPT | Performed by: PHYSICIAN ASSISTANT

## 2019-08-31 PROCEDURE — 93226 XTRNL ECG REC<48 HR SCAN A/R: CPT | Performed by: PHYSICIAN ASSISTANT

## 2019-09-03 NOTE — PROGRESS NOTES
No hemodynamically significant carotid stenosis. Holter monitor results not back yet. Pt needs to schedule follow up appt. See my last note if more details needed.

## 2019-09-05 ENCOUNTER — OFFICE VISIT (OUTPATIENT)
Dept: PODIATRY CLINIC | Facility: CLINIC | Age: 79
End: 2019-09-05
Payer: MEDICARE

## 2019-09-05 DIAGNOSIS — M19.079 ARTHRITIS OF MIDFOOT: Primary | ICD-10-CM

## 2019-09-05 PROCEDURE — 99213 OFFICE O/P EST LOW 20 MIN: CPT | Performed by: PODIATRIST

## 2019-09-05 RX ORDER — ERGOCALCIFEROL 1.25 MG/1
CAPSULE ORAL
Refills: 0 | COMMUNITY
Start: 2019-08-29 | End: 2021-04-06

## 2019-09-09 NOTE — PROGRESS NOTES
Isabel Lares is a 78year old female. Patient presents with: Follow - Up: LOV 8/15/19. injection on LOV. pt states she is \"perfect\" now.  \"I can't believe it\"        HPI:   Patient returns to the clinic states today for injection foot is feeling much SURGERY     • KNEE SURGERY Left 04/01/2019    TKR   • OTHER  1961    bladder surgery, removal of calcified lesion   • OTHER SURGICAL HISTORY  1/2011    injection   • OTHER SURGICAL HISTORY N/A 08/01/1962    Calcium deposit removed from urinary bladder   • apparent distress  EXTREMITIES:   1. Integument: Skin on her feet is warm and dry. 2. Vascular: Patient has palpable pulses both dorsalis pedis and posterior tibial   3. Neurologic: She has intact sensorium with no deficits   4.  Musculoskeletal: Patient

## 2019-09-10 ENCOUNTER — TELEPHONE (OUTPATIENT)
Dept: INTERNAL MEDICINE CLINIC | Facility: CLINIC | Age: 79
End: 2019-09-10

## 2019-09-18 ENCOUNTER — OFFICE VISIT (OUTPATIENT)
Dept: INTERNAL MEDICINE CLINIC | Facility: CLINIC | Age: 79
End: 2019-09-18
Payer: MEDICARE

## 2019-09-18 ENCOUNTER — LAB ENCOUNTER (OUTPATIENT)
Dept: LAB | Age: 79
End: 2019-09-18
Attending: INTERNAL MEDICINE
Payer: MEDICARE

## 2019-09-18 VITALS
HEIGHT: 64 IN | TEMPERATURE: 98 F | BODY MASS INDEX: 25.95 KG/M2 | DIASTOLIC BLOOD PRESSURE: 76 MMHG | OXYGEN SATURATION: 97 % | RESPIRATION RATE: 16 BRPM | HEART RATE: 83 BPM | SYSTOLIC BLOOD PRESSURE: 122 MMHG | WEIGHT: 152 LBS

## 2019-09-18 DIAGNOSIS — R93.5 ABNORMAL CT OF THE ABDOMEN: ICD-10-CM

## 2019-09-18 DIAGNOSIS — R19.5 LOOSE STOOLS: ICD-10-CM

## 2019-09-18 DIAGNOSIS — E55.9 VITAMIN D DEFICIENCY: ICD-10-CM

## 2019-09-18 DIAGNOSIS — R07.89 CHEST PRESSURE: Primary | ICD-10-CM

## 2019-09-18 DIAGNOSIS — Z23 NEED FOR INFLUENZA VACCINATION: ICD-10-CM

## 2019-09-18 DIAGNOSIS — R42 DIZZINESS: ICD-10-CM

## 2019-09-18 DIAGNOSIS — R19.5 DARK STOOLS: ICD-10-CM

## 2019-09-18 PROCEDURE — 99214 OFFICE O/P EST MOD 30 MIN: CPT | Performed by: PHYSICIAN ASSISTANT

## 2019-09-18 PROCEDURE — G0008 ADMIN INFLUENZA VIRUS VAC: HCPCS | Performed by: PHYSICIAN ASSISTANT

## 2019-09-18 PROCEDURE — 90662 IIV NO PRSV INCREASED AG IM: CPT | Performed by: PHYSICIAN ASSISTANT

## 2019-09-18 PROCEDURE — 93000 ELECTROCARDIOGRAM COMPLETE: CPT | Performed by: PHYSICIAN ASSISTANT

## 2019-09-18 NOTE — PROGRESS NOTES
Patient presents with:  Test Results: rm 6 MM      HPI:  Pt presents for follow up of dizziness, numbness and tingling. She believes it was related to taking high dose Vit D daily instead of weekly. She has not completed her labs yet.     Pt is also c/o c (Inder) Take 1 capsule by mouth daily. Disp:  Rfl:    Lansoprazole (PREVACID SOLUTAB) 15 MG Oral Tablet Dispersible Take by mouth. Disp:  Rfl:    ACETAMINOPHEN OR Take by mouth.  Disp:  Rfl:    Cyanocobalamin (VITAMIN B-12 OR) Take by edil were answered and the patient understands the plan.

## 2019-09-24 ENCOUNTER — LAB ENCOUNTER (OUTPATIENT)
Dept: LAB | Age: 79
End: 2019-09-24
Attending: PHYSICIAN ASSISTANT
Payer: MEDICARE

## 2019-09-24 DIAGNOSIS — E55.9 VITAMIN D DEFICIENCY: ICD-10-CM

## 2019-09-24 DIAGNOSIS — R19.5 DARK STOOLS: ICD-10-CM

## 2019-09-24 DIAGNOSIS — R42 DIZZINESS: ICD-10-CM

## 2019-09-24 DIAGNOSIS — R93.5 ABNORMAL CT OF THE ABDOMEN: ICD-10-CM

## 2019-09-24 DIAGNOSIS — R19.5 LOOSE STOOLS: ICD-10-CM

## 2019-09-24 LAB
ALBUMIN SERPL-MCNC: 3.6 G/DL (ref 3.4–5)
ALBUMIN/GLOB SERPL: 1.1 {RATIO} (ref 1–2)
ALP LIVER SERPL-CCNC: 81 U/L (ref 55–142)
ALT SERPL-CCNC: 23 U/L (ref 13–56)
ANION GAP SERPL CALC-SCNC: 7 MMOL/L (ref 0–18)
AST SERPL-CCNC: 22 U/L (ref 15–37)
BASOPHILS # BLD AUTO: 0.07 X10(3) UL (ref 0–0.2)
BASOPHILS NFR BLD AUTO: 1 %
BILIRUB SERPL-MCNC: 0.4 MG/DL (ref 0.1–2)
BILIRUB UR QL STRIP.AUTO: NEGATIVE
BUN BLD-MCNC: 19 MG/DL (ref 7–18)
BUN/CREAT SERPL: 24.1 (ref 10–20)
CALCIUM BLD-MCNC: 9.1 MG/DL (ref 8.5–10.1)
CHLORIDE SERPL-SCNC: 104 MMOL/L (ref 98–112)
CO2 SERPL-SCNC: 25 MMOL/L (ref 21–32)
COLOR UR AUTO: YELLOW
CREAT BLD-MCNC: 0.79 MG/DL (ref 0.55–1.02)
DEPRECATED RDW RBC AUTO: 56.4 FL (ref 35.1–46.3)
EOSINOPHIL # BLD AUTO: 0.08 X10(3) UL (ref 0–0.7)
EOSINOPHIL NFR BLD AUTO: 1.2 %
ERYTHROCYTE [DISTWIDTH] IN BLOOD BY AUTOMATED COUNT: 16.4 % (ref 11–15)
GLOBULIN PLAS-MCNC: 3.3 G/DL (ref 2.8–4.4)
GLUCOSE BLD-MCNC: 68 MG/DL (ref 70–99)
GLUCOSE UR STRIP.AUTO-MCNC: NEGATIVE MG/DL
HCT VFR BLD AUTO: 42.6 % (ref 35–48)
HGB BLD-MCNC: 13.4 G/DL (ref 12–16)
HYALINE CASTS #/AREA URNS AUTO: PRESENT /LPF
IMM GRANULOCYTES # BLD AUTO: 0.02 X10(3) UL (ref 0–1)
IMM GRANULOCYTES NFR BLD: 0.3 %
KETONES UR STRIP.AUTO-MCNC: NEGATIVE MG/DL
LYMPHOCYTES # BLD AUTO: 2.38 X10(3) UL (ref 1–4)
LYMPHOCYTES NFR BLD AUTO: 35.5 %
M PROTEIN MFR SERPL ELPH: 6.9 G/DL (ref 6.4–8.2)
MCH RBC QN AUTO: 29.5 PG (ref 26–34)
MCHC RBC AUTO-ENTMCNC: 31.5 G/DL (ref 31–37)
MCV RBC AUTO: 93.8 FL (ref 80–100)
MONOCYTES # BLD AUTO: 0.56 X10(3) UL (ref 0.1–1)
MONOCYTES NFR BLD AUTO: 8.3 %
NEUTROPHILS # BLD AUTO: 3.6 X10 (3) UL (ref 1.5–7.7)
NEUTROPHILS # BLD AUTO: 3.6 X10(3) UL (ref 1.5–7.7)
NEUTROPHILS NFR BLD AUTO: 53.7 %
NITRITE UR QL STRIP.AUTO: NEGATIVE
OSMOLALITY SERPL CALC.SUM OF ELEC: 283 MOSM/KG (ref 275–295)
PH UR STRIP.AUTO: 6 [PH] (ref 4.5–8)
PLATELET # BLD AUTO: 228 10(3)UL (ref 150–450)
POTASSIUM SERPL-SCNC: 4.4 MMOL/L (ref 3.5–5.1)
PROT UR STRIP.AUTO-MCNC: NEGATIVE MG/DL
RBC # BLD AUTO: 4.54 X10(6)UL (ref 3.8–5.3)
SODIUM SERPL-SCNC: 136 MMOL/L (ref 136–145)
SP GR UR STRIP.AUTO: 1.01 (ref 1–1.03)
TSI SER-ACNC: 1.45 MIU/ML (ref 0.36–3.74)
UROBILINOGEN UR STRIP.AUTO-MCNC: <2 MG/DL
VIT D+METAB SERPL-MCNC: 40.1 NG/ML (ref 30–100)
WBC # BLD AUTO: 6.7 X10(3) UL (ref 4–11)

## 2019-09-24 PROCEDURE — 87086 URINE CULTURE/COLONY COUNT: CPT

## 2019-09-24 PROCEDURE — 82306 VITAMIN D 25 HYDROXY: CPT

## 2019-09-24 PROCEDURE — 36415 COLL VENOUS BLD VENIPUNCTURE: CPT

## 2019-09-24 PROCEDURE — 80053 COMPREHEN METABOLIC PANEL: CPT

## 2019-09-24 PROCEDURE — 84443 ASSAY THYROID STIM HORMONE: CPT

## 2019-09-24 PROCEDURE — 85025 COMPLETE CBC W/AUTO DIFF WBC: CPT

## 2019-09-24 PROCEDURE — 81001 URINALYSIS AUTO W/SCOPE: CPT

## 2019-09-25 NOTE — PROGRESS NOTES
Labs are normal/stable except for UA. Looks like it was not a clean catch specimen. Ur cx is pending. Will see what result of ur cx shows.

## 2019-10-22 ENCOUNTER — HOSPITAL ENCOUNTER (OUTPATIENT)
Dept: CV DIAGNOSTICS | Facility: HOSPITAL | Age: 79
Discharge: HOME | End: 2019-10-22
Attending: PHYSICIAN ASSISTANT
Payer: MEDICARE

## 2019-10-22 DIAGNOSIS — R07.89 CHEST PRESSURE: ICD-10-CM

## 2019-10-22 PROCEDURE — 93018 CV STRESS TEST I&R ONLY: CPT | Performed by: PHYSICIAN ASSISTANT

## 2019-10-22 PROCEDURE — 78452 HT MUSCLE IMAGE SPECT MULT: CPT | Performed by: PHYSICIAN ASSISTANT

## 2019-10-22 PROCEDURE — 93017 CV STRESS TEST TRACING ONLY: CPT | Performed by: PHYSICIAN ASSISTANT

## 2019-10-29 ENCOUNTER — OFFICE VISIT (OUTPATIENT)
Dept: INTERNAL MEDICINE CLINIC | Facility: CLINIC | Age: 79
End: 2019-10-29
Payer: MEDICARE

## 2019-10-29 VITALS
HEART RATE: 76 BPM | RESPIRATION RATE: 16 BRPM | WEIGHT: 157.19 LBS | DIASTOLIC BLOOD PRESSURE: 80 MMHG | BODY MASS INDEX: 26.84 KG/M2 | HEIGHT: 64 IN | SYSTOLIC BLOOD PRESSURE: 138 MMHG

## 2019-10-29 DIAGNOSIS — I65.23 BILATERAL CAROTID ARTERY STENOSIS: ICD-10-CM

## 2019-10-29 DIAGNOSIS — E78.5 DYSLIPIDEMIA: ICD-10-CM

## 2019-10-29 DIAGNOSIS — F41.9 ANXIETY: ICD-10-CM

## 2019-10-29 DIAGNOSIS — R42 DIZZINESS: Primary | ICD-10-CM

## 2019-10-29 PROBLEM — R31.29 MICROHEMATURIA: Status: ACTIVE | Noted: 2019-10-29

## 2019-10-29 PROCEDURE — 99214 OFFICE O/P EST MOD 30 MIN: CPT | Performed by: NURSE PRACTITIONER

## 2019-10-29 RX ORDER — ALPRAZOLAM 0.25 MG/1
0.25 TABLET ORAL DAILY PRN
Qty: 30 TABLET | Refills: 0 | Status: SHIPPED | OUTPATIENT
Start: 2019-10-29 | End: 2020-08-12

## 2019-10-29 RX ORDER — AMOXICILLIN 500 MG/1
CAPSULE ORAL
Refills: 0 | COMMUNITY
Start: 2019-10-28 | End: 2021-04-07

## 2019-10-29 NOTE — PROGRESS NOTES
Ronn Villavicencio is a 78year old female. Patient presents with: Follow - Up: cn room 10: discuss chest pressure and stress       HPI:   Here for follow up   Seen by CB in Sept for chest pressure   Nuclear stress test negative.   Her bp was elevated at time as needed for Pain., Disp: , Rfl:   ESCITALOPRAM 10 MG Oral Tab, TAKE 1 TABLET BY MOUTH DAILY, Disp: 30 tablet, Rfl: 0  Mometasone Furoate 0.1 % External Cream, Apply topically. , Disp: , Rfl:        Past Medical History:   Diagnosis Date   • Acute sinusiti bowel sounds, no masses, HSM or tenderness  EXTREMITIES: no edema, normal strength and tone  PSYCH: alert and oriented x 3    ASSESSMENT AND PLAN:     Dizziness  (primary encounter diagnosis)  MRI brain.     Bilateral carotid artery stenosis  Add ASA   Has

## 2019-11-14 NOTE — TELEPHONE ENCOUNTER
LOV:10/29/19 SD  FOV:none on file   LAST RX:8/16/19 10 mg take 1 tab daily 90 tabs 0 refills   LAST LABS:9/24/19 cbc,cmp,tsh,vit D,urine culture,urinalysis  PER PROTOCOL:to provider

## 2019-11-15 RX ORDER — ESCITALOPRAM OXALATE 10 MG/1
TABLET ORAL
Qty: 90 TABLET | Refills: 1 | Status: SHIPPED | OUTPATIENT
Start: 2019-11-15 | End: 2019-11-27

## 2019-11-21 RX ORDER — ESCITALOPRAM OXALATE 10 MG/1
TABLET ORAL
Qty: 30 TABLET | Refills: 0 | OUTPATIENT
Start: 2019-11-21

## 2019-11-27 ENCOUNTER — APPOINTMENT (OUTPATIENT)
Dept: LAB | Age: 79
End: 2019-11-27
Attending: NURSE PRACTITIONER
Payer: MEDICARE

## 2019-11-27 DIAGNOSIS — E53.8 VITAMIN B 12 DEFICIENCY: ICD-10-CM

## 2019-11-27 PROCEDURE — 82607 VITAMIN B-12: CPT

## 2019-11-27 NOTE — PROGRESS NOTES
Jazzy Nelson is a 78year old female. Patient presents with:  Diarrhea: x \"several months\", loose, denies abd pain, denies blood  Other: x 2-3wks, notes \"swishing\" sound in head, usually while walking    HPI:   Presents for eval of multiple issues. units Oral Cap TK 1 C PO ONCE A WEEK.  0   • ibuprofen 200 MG Oral Tab Take 200 mg by mouth every 6 (six) hours as needed for Pain.      • ESCITALOPRAM 10 MG Oral Tab TAKE 1 TABLET BY MOUTH DAILY 30 tablet 0   • Mometasone Furoate 0.1 % External Cream Apply nourished,in no apparent distress  LUNGS: normal rate without respiratory distress, lungs clear to auscultation  CARDIO: RRR   GI: normal bowel sounds, no masses, HSM or tenderness  EXTREMITIES: no edema, normal strength and tone  PSYCH: alert and oriented

## 2019-12-04 ENCOUNTER — NURSE ONLY (OUTPATIENT)
Dept: INTERNAL MEDICINE CLINIC | Facility: CLINIC | Age: 79
End: 2019-12-04
Payer: MEDICARE

## 2019-12-04 PROCEDURE — 96372 THER/PROPH/DIAG INJ SC/IM: CPT | Performed by: NURSE PRACTITIONER

## 2019-12-04 RX ADMIN — CYANOCOBALAMIN 1000 MCG: 1000 INJECTION INTRAMUSCULAR; SUBCUTANEOUS at 13:00:00

## 2019-12-13 ENCOUNTER — OFFICE VISIT (OUTPATIENT)
Dept: PODIATRY CLINIC | Facility: CLINIC | Age: 79
End: 2019-12-13
Payer: MEDICARE

## 2019-12-13 VITALS — DIASTOLIC BLOOD PRESSURE: 71 MMHG | RESPIRATION RATE: 14 BRPM | HEART RATE: 67 BPM | SYSTOLIC BLOOD PRESSURE: 142 MMHG

## 2019-12-13 DIAGNOSIS — M19.079 ARTHRITIS OF MIDFOOT: Primary | ICD-10-CM

## 2019-12-13 DIAGNOSIS — M79.672 FOOT PAIN, LEFT: ICD-10-CM

## 2019-12-13 PROCEDURE — 20600 DRAIN/INJ JOINT/BURSA W/O US: CPT | Performed by: PODIATRIST

## 2019-12-13 RX ORDER — TRIAMCINOLONE ACETONIDE 40 MG/ML
40 INJECTION, SUSPENSION INTRA-ARTICULAR; INTRAMUSCULAR ONCE
Status: COMPLETED | OUTPATIENT
Start: 2019-12-13 | End: 2019-12-13

## 2019-12-13 RX ORDER — CLOBETASOL PROPIONATE 0.5 MG/G
OINTMENT TOPICAL
Refills: 0 | COMMUNITY
Start: 2019-12-03

## 2019-12-13 RX ADMIN — TRIAMCINOLONE ACETONIDE 40 MG: 40 INJECTION, SUSPENSION INTRA-ARTICULAR; INTRAMUSCULAR at 16:20:00

## 2019-12-13 NOTE — PROGRESS NOTES
Per Dr Milli Eubanks, draw up 1 mL of 0.5% Marcaine and 1 mL of Kenalog 40 for injection to left foot. KP  Patient provided education handout for cortisone injection.

## 2019-12-18 NOTE — PROGRESS NOTES
Fernanda Friedman is a 78year old female. Patient presents with: Follow - Up: LOV 9/5/19. pt is now experiencing intermittent pain in left foot. pt thinks it is due to the weather and her activity level. occasional severe pain.  pain is often 1-2/10, but when deficiency       Past Surgical History:   Procedure Laterality Date   • APPENDECTOMY     • COLONOSCOPY  1/21/05, 04/08/2015    Maury Ordaz MD, [facility]   • KNEE ARTHROSCOPY Left 10/21/16--Dr. Christian Morrow    partial medial and lateral meniscectomies   • SYSTEMS:   Today reviewed systens as documented below  GENERAL HEALTH: feels well otherwise  SKIN: Refer to exam below  RESPIRATORY: denies shortness of breath with exertion  CARDIOVASCULAR: denies chest pain on exertion  GI: denies abdominal pain and gina

## 2020-02-13 RX ORDER — LOSARTAN POTASSIUM 25 MG/1
TABLET ORAL
Qty: 90 TABLET | Refills: 0 | Status: SHIPPED | OUTPATIENT
Start: 2020-02-13 | End: 2020-05-11

## 2020-04-13 ENCOUNTER — VIRTUAL PHONE E/M (OUTPATIENT)
Dept: INTERNAL MEDICINE CLINIC | Facility: CLINIC | Age: 80
End: 2020-04-13
Payer: MEDICARE

## 2020-04-13 DIAGNOSIS — R50.9 LOW GRADE FEVER: Primary | ICD-10-CM

## 2020-04-13 DIAGNOSIS — R53.81 MALAISE AND FATIGUE: ICD-10-CM

## 2020-04-13 DIAGNOSIS — R53.83 MALAISE AND FATIGUE: ICD-10-CM

## 2020-04-13 PROCEDURE — 99441 PHONE E/M BY PHYS 5-10 MIN: CPT | Performed by: NURSE PRACTITIONER

## 2020-04-13 NOTE — PROGRESS NOTES
Due to COVID-19 ACTION PLAN, the patient's office visit was converted to a phone or video visit. This visit is conducted using live video and/or audio. The patient consents to this service.   The patient understands and accepts financial responsibility fo replacement (TKR)     Primary osteoarthritis of right knee     Hematuria     S/P left knee arthroscopy     History of malignant neoplasm of skin     Dyslipidemia     Anxiety     Tendonitis of foot     Microhematuria    Current Outpatient Medications   Medi Consults:  None          Time Spent: 7min      Brandon York understands phone evaluation is not a substitute for face-to-face examination or emergency care. Patient advised to go to ER or call 911 for worsening symptoms or acute distress.

## 2020-05-11 RX ORDER — ESCITALOPRAM OXALATE 10 MG/1
TABLET ORAL
Qty: 90 TABLET | Refills: 0 | Status: SHIPPED | OUTPATIENT
Start: 2020-05-11 | End: 2020-08-14

## 2020-05-11 RX ORDER — LOSARTAN POTASSIUM 25 MG/1
TABLET ORAL
Qty: 90 TABLET | Refills: 0 | Status: SHIPPED | OUTPATIENT
Start: 2020-05-11 | End: 2020-08-20

## 2020-05-11 NOTE — TELEPHONE ENCOUNTER
Last Ov: 11/27/19, SD, acute  Upcoming appt: no upcoming appt  Last labs: Vit B12 11/27/19  Last Rx: escitalopram 10mg, #30, 0R 4/19/19    Per Protocol, escitalopram not on protocol. Rx pending. Losartan passed, refill sent.       FWD to [facility], please ass

## 2020-05-28 RX ORDER — LOSARTAN POTASSIUM 25 MG/1
TABLET ORAL
Qty: 90 TABLET | Refills: 0 | OUTPATIENT
Start: 2020-05-28

## 2020-05-28 NOTE — TELEPHONE ENCOUNTER
Last VISIT 11/27/19 SD Anxiety, Vit B12, 4/13/20 SD Virtual E/M Fever    Last REFILL 5/11/20 Losartan 90T 0R     Last LABS 9/24/19 UA, CBC, CMP, TSH, VitD, Urine culture, UA    No future appointments. Per PROTOCOL?  HTN protocol failed, No visit in l

## 2020-07-06 ENCOUNTER — TELEPHONE (OUTPATIENT)
Dept: PODIATRY CLINIC | Facility: CLINIC | Age: 80
End: 2020-07-06

## 2020-07-06 NOTE — TELEPHONE ENCOUNTER
Spoke to pt and rescheduled appt for her left foot pain on 07/22/20 in [address] with Michael. Pt verbalized understanding.

## 2020-07-06 NOTE — TELEPHONE ENCOUNTER
Patient just came back from [place] and has to quarantine for 14 days. She needs to reschedule her injection.  Please advise

## 2020-07-22 ENCOUNTER — OFFICE VISIT (OUTPATIENT)
Dept: PODIATRY CLINIC | Facility: CLINIC | Age: 80
End: 2020-07-22
Payer: MEDICARE

## 2020-07-22 VITALS — DIASTOLIC BLOOD PRESSURE: 78 MMHG | SYSTOLIC BLOOD PRESSURE: 124 MMHG

## 2020-07-22 DIAGNOSIS — M79.672 FOOT PAIN, LEFT: Primary | ICD-10-CM

## 2020-07-22 DIAGNOSIS — M19.079 ARTHRITIS OF MIDFOOT: ICD-10-CM

## 2020-07-22 PROCEDURE — 20600 DRAIN/INJ JOINT/BURSA W/O US: CPT | Performed by: PODIATRIST

## 2020-07-22 RX ORDER — TRIAMCINOLONE ACETONIDE 40 MG/ML
20 INJECTION, SUSPENSION INTRA-ARTICULAR; INTRAMUSCULAR ONCE
Status: COMPLETED | OUTPATIENT
Start: 2020-07-22 | End: 2020-07-22

## 2020-07-22 NOTE — PROCEDURES
Per Dr Milli Eubanks, draw up 0.5mL of 0.5% Marcaine and 0.5mL of Kenalog 40 for injection to left foot injection. Patient left office prior to obtaining post injection vitals.

## 2020-07-25 NOTE — PROGRESS NOTES
Jacqui Lieberman is a 78year old female. Patient presents with: Follow - Up: left foot pain - pain scale 8/10 - takes aleve as needed.         HPI:   This pleasant patient presents to the clinic with a chief complaint of left foot pain which is reoccurring s 04/08/2015    Han Dyer MD, Monica Mathews   • KNEE ARTHROSCOPY Left 10/21/16--Dr. Garrett Mixon    partial medial and lateral meniscectomies   • KNEE JOINT INJECTION UNDER FLUOROSCOPY RIGHT OR LEFT Right 3/4/2014    Performed by Regan Grimm MD at [facility] Marcia Valdes below  RESPIRATORY: denies shortness of breath with exertion  CARDIOVASCULAR: denies chest pain on exertion  GI: denies abdominal pain and denies heartburn  NEURO: denies headaches    EXAM:   /78 (BP Location: Left arm, Patient Position: Sitting, Cuf

## 2020-08-12 ENCOUNTER — OFFICE VISIT (OUTPATIENT)
Dept: INTERNAL MEDICINE CLINIC | Facility: CLINIC | Age: 80
End: 2020-08-12
Payer: MEDICARE

## 2020-08-12 VITALS
SYSTOLIC BLOOD PRESSURE: 112 MMHG | BODY MASS INDEX: 26.44 KG/M2 | TEMPERATURE: 98 F | HEART RATE: 76 BPM | WEIGHT: 153 LBS | RESPIRATION RATE: 16 BRPM | DIASTOLIC BLOOD PRESSURE: 66 MMHG | HEIGHT: 63.78 IN

## 2020-08-12 DIAGNOSIS — E53.8 VITAMIN B 12 DEFICIENCY: ICD-10-CM

## 2020-08-12 DIAGNOSIS — J30.9 ALLERGIC RHINITIS, UNSPECIFIED SEASONALITY, UNSPECIFIED TRIGGER: ICD-10-CM

## 2020-08-12 DIAGNOSIS — M47.817 SPONDYLOSIS OF LUMBOSACRAL REGION WITHOUT MYELOPATHY OR RADICULOPATHY: ICD-10-CM

## 2020-08-12 DIAGNOSIS — R42 VERTIGO: ICD-10-CM

## 2020-08-12 DIAGNOSIS — M17.11 PRIMARY OSTEOARTHRITIS OF RIGHT KNEE: ICD-10-CM

## 2020-08-12 DIAGNOSIS — N64.4 BREAST PAIN, RIGHT: ICD-10-CM

## 2020-08-12 DIAGNOSIS — Z00.00 ENCOUNTER FOR ANNUAL HEALTH EXAMINATION: Primary | ICD-10-CM

## 2020-08-12 DIAGNOSIS — M47.816 LUMBAR FACET ARTHROPATHY: ICD-10-CM

## 2020-08-12 DIAGNOSIS — Z85.828 HISTORY OF MALIGNANT NEOPLASM OF SKIN: ICD-10-CM

## 2020-08-12 DIAGNOSIS — E78.5 DYSLIPIDEMIA: ICD-10-CM

## 2020-08-12 DIAGNOSIS — Z12.31 ENCOUNTER FOR SCREENING MAMMOGRAM FOR MALIGNANT NEOPLASM OF BREAST: ICD-10-CM

## 2020-08-12 DIAGNOSIS — M85.88 OSTEOPENIA OF SPINE: ICD-10-CM

## 2020-08-12 DIAGNOSIS — M79.672 LEFT FOOT PAIN: ICD-10-CM

## 2020-08-12 DIAGNOSIS — M77.50 TENDONITIS OF FOOT: ICD-10-CM

## 2020-08-12 DIAGNOSIS — IMO0002 OSTEOARTHROSIS INVOLVING LOWER LEG: ICD-10-CM

## 2020-08-12 DIAGNOSIS — R41.3 MEMORY CHANGE: ICD-10-CM

## 2020-08-12 DIAGNOSIS — E55.9 VITAMIN D DEFICIENCY: ICD-10-CM

## 2020-08-12 DIAGNOSIS — N64.4 BREAST PAIN, LEFT: ICD-10-CM

## 2020-08-12 DIAGNOSIS — Z85.828 PERSONAL HISTORY OF OTHER MALIGNANT NEOPLASM OF SKIN: ICD-10-CM

## 2020-08-12 DIAGNOSIS — M47.818 ARTHRITIS OF SACROILIAC JOINT: ICD-10-CM

## 2020-08-12 DIAGNOSIS — F41.9 ANXIETY: ICD-10-CM

## 2020-08-12 DIAGNOSIS — I65.23 BILATERAL CAROTID ARTERY STENOSIS: ICD-10-CM

## 2020-08-12 DIAGNOSIS — Z96.652 HISTORY OF TOTAL LEFT KNEE REPLACEMENT (TKR): ICD-10-CM

## 2020-08-12 DIAGNOSIS — R31.29 MICROHEMATURIA: ICD-10-CM

## 2020-08-12 PROCEDURE — 99214 OFFICE O/P EST MOD 30 MIN: CPT | Performed by: NURSE PRACTITIONER

## 2020-08-12 PROCEDURE — G0439 PPPS, SUBSEQ VISIT: HCPCS | Performed by: NURSE PRACTITIONER

## 2020-08-12 RX ORDER — ALPRAZOLAM 0.25 MG/1
0.25 TABLET ORAL DAILY PRN
Qty: 30 TABLET | Refills: 0 | Status: SHIPPED | OUTPATIENT
Start: 2020-08-12 | End: 2021-01-04

## 2020-08-12 NOTE — PATIENT INSTRUCTIONS
Itlaia Mckeon's SCREENING SCHEDULE   Tests on this list are recommended by your physician but may not be covered, or covered at this frequency, by your insurer. Please check with your insurance carrier before scheduling to verify coverage.    PREVENTATIVE following criteria:   • Men who are 73-68 years old and have smoked more than 100 cigarettes in their lifetime   • Anyone with a family history    Colorectal Cancer Screening  Covered up to Age 76     Colonoscopy Screen   Covered every 10 years- more often Influenza  Covered Annually Orders placed or performed in visit on 09/18/19   • FLU VACC HIGH DOSE PRSV FREE    Please get every year    Pneumococcal 13 (Prevnar)  Covered Once after 65 Orders placed or performed in visit on 02/08/17   • PNEUMOCOCCAL VA

## 2020-08-12 NOTE — PROGRESS NOTES
HPI:   Cheyanne Espinosa is a 78year old female who presents for a Medicare Subsequent Annual Wellness visit (Pt already had Initial Annual Wellness). Vitamin D deficiency  Cont vit d supplement. -     CBC WITH DIFFERENTIAL WITH PLATELET;  Future  -     CO malignant neoplasm of skin  Follows with dermatology  -     CBC WITH DIFFERENTIAL WITH PLATELET; Future  -     COMP METABOLIC PANEL (14); Future    History of total left knee replacement (TKR)  -     COMP METABOLIC PANEL (14);  Future    History of malignan No dizziness when getting up in the middle of hte night to use the BR. Happening every night. Discussed vestibular exercises. She will try at home and if no improvement she will consider PT.       Her last annual assessment has been over 1 year: Annual P we do not have it in [facility], and patient is instructed to get our office a copy of it for scanning into Epic. She has never smoked tobacco.  Ms. Joss Kasper does not currently take aspirin.  Patient is already taking aspirin    CAGE Alcohol screening   Victor Hugo 09/24/2019    .0 09/24/2019        ALLERGIES:   She is allergic to other. CURRENT MEDICATIONS:   ALPRAZolam (XANAX) 0.25 MG Oral Tab, Take 1 tablet (0.25 mg total) by mouth daily as needed for Anxiety.   ESCITALOPRAM 10 MG Oral Tab, TAKE 1 TABLET she does not use drugs. REVIEW OF SYSTEMS:   Review of Systems   Constitutional: Negative for fever, chills and fatigue. No distress. HENT: Negative for hearing loss, congestion, sore throat, neck pain and dental problem.     Eyes: Negative for pain an tenderness/mass/nodules; no carotid bruit or JVD   Back:   Symmetric, no curvature, ROM normal, no CVA tenderness   Lungs:   Clear to auscultation bilaterally, respirations unlabored   Heart:  Regular rate and rhythm, S1 and S2 normal, no murmur, rub, or g supplement. -     CBC WITH DIFFERENTIAL WITH PLATELET; Future  -     COMP METABOLIC PANEL (14); Future  -     VITAMIN D, 25-HYDROXY; Future    Vitamin B 12 deficiency  Has not been taking oral supplement  Has not had injecitons for some time.    Will check replacement (TKR)  -     COMP METABOLIC PANEL (14); Future    History of malignant neoplasm of skin  -     COMP METABOLIC PANEL (14);  Future    Dyslipidemia  Due for lipid  Diet, exercise, weight loss and lifestyle modification.      -     CBC WITH DIFFERE she will consider PT. Other orders  -     ALPRAZolam (XANAX) 0.25 MG Oral Tab; Take 1 tablet (0.25 mg total) by mouth daily as needed for Anxiety.          Diet assessment: good     PLAN:  The patient indicates understanding of these issues and agrees Glaucoma, AA>50, > 65 No flowsheet data found. Bone Density Screening      Dexascan Every two years Last Dexa Scan:   XR DEXA BONE DENSITOMETRY (CPT=77080) 03/30/2018    No flowsheet data found.     Pap and Pelvic      Pap: Every 3 yrs age 21-65 Potassium (mmol/L)   Date Value   09/24/2019 4.4     POTASSIUM (mmol/L)   Date Value   04/14/2014 4.0   08/28/2012 3.7    No flowsheet data found.     Creatinine  Annually CREATININE (mg/dL)   Date Value   04/14/2014 0.57     Creatinine (mg/dL)   Date Value

## 2020-08-13 NOTE — TELEPHONE ENCOUNTER
Last OV: 8/12/20 PE    Future Appointments   Date Time Provider Audie Dooley   8/14/2020 11:30 AM REF ROGER REF [id number] Ref [facility]   8/18/2020 12:40 PM [facility] [address] [facility]        Latest labs: 9/24/19 CBC, CMP, TSH w/ref and Vit D    Latest RX: ESCITALOPRAM 10MG TABLETS 90 tabs 0 refills on 5/11/20    Per protocol, not on protocol. Rx pending.

## 2020-08-14 ENCOUNTER — LAB ENCOUNTER (OUTPATIENT)
Dept: LAB | Age: 80
End: 2020-08-14
Attending: NURSE PRACTITIONER
Payer: MEDICARE

## 2020-08-14 DIAGNOSIS — E55.9 VITAMIN D DEFICIENCY: ICD-10-CM

## 2020-08-14 DIAGNOSIS — IMO0002 OSTEOARTHROSIS INVOLVING LOWER LEG: ICD-10-CM

## 2020-08-14 DIAGNOSIS — E53.8 VITAMIN B 12 DEFICIENCY: ICD-10-CM

## 2020-08-14 DIAGNOSIS — J30.9 ALLERGIC RHINITIS, UNSPECIFIED SEASONALITY, UNSPECIFIED TRIGGER: ICD-10-CM

## 2020-08-14 DIAGNOSIS — M17.11 PRIMARY OSTEOARTHRITIS OF RIGHT KNEE: ICD-10-CM

## 2020-08-14 DIAGNOSIS — M85.88 OSTEOPENIA OF SPINE: ICD-10-CM

## 2020-08-14 DIAGNOSIS — F41.9 ANXIETY: ICD-10-CM

## 2020-08-14 DIAGNOSIS — M79.672 LEFT FOOT PAIN: ICD-10-CM

## 2020-08-14 DIAGNOSIS — R31.29 MICROHEMATURIA: ICD-10-CM

## 2020-08-14 DIAGNOSIS — Z96.652 HISTORY OF TOTAL LEFT KNEE REPLACEMENT (TKR): ICD-10-CM

## 2020-08-14 DIAGNOSIS — E78.5 DYSLIPIDEMIA: ICD-10-CM

## 2020-08-14 DIAGNOSIS — M47.816 LUMBAR FACET ARTHROPATHY: ICD-10-CM

## 2020-08-14 DIAGNOSIS — M47.817 SPONDYLOSIS OF LUMBOSACRAL REGION WITHOUT MYELOPATHY OR RADICULOPATHY: ICD-10-CM

## 2020-08-14 DIAGNOSIS — Z85.828 HISTORY OF MALIGNANT NEOPLASM OF SKIN: ICD-10-CM

## 2020-08-14 DIAGNOSIS — Z85.828 PERSONAL HISTORY OF OTHER MALIGNANT NEOPLASM OF SKIN: ICD-10-CM

## 2020-08-14 DIAGNOSIS — M77.50 TENDONITIS OF FOOT: ICD-10-CM

## 2020-08-14 DIAGNOSIS — I65.23 BILATERAL CAROTID ARTERY STENOSIS: ICD-10-CM

## 2020-08-14 DIAGNOSIS — M47.818 ARTHRITIS OF SACROILIAC JOINT: ICD-10-CM

## 2020-08-14 LAB
ALBUMIN SERPL-MCNC: 3.6 G/DL (ref 3.4–5)
ALBUMIN/GLOB SERPL: 1.1 {RATIO} (ref 1–2)
ALP LIVER SERPL-CCNC: 78 U/L (ref 55–142)
ALT SERPL-CCNC: 22 U/L (ref 13–56)
ANION GAP SERPL CALC-SCNC: 7 MMOL/L (ref 0–18)
AST SERPL-CCNC: 18 U/L (ref 15–37)
BASOPHILS # BLD AUTO: 0.04 X10(3) UL (ref 0–0.2)
BASOPHILS NFR BLD AUTO: 0.7 %
BILIRUB SERPL-MCNC: 0.5 MG/DL (ref 0.1–2)
BILIRUB UR QL STRIP.AUTO: NEGATIVE
BUN BLD-MCNC: 17 MG/DL (ref 7–18)
BUN/CREAT SERPL: 20.2 (ref 10–20)
CALCIUM BLD-MCNC: 9.5 MG/DL (ref 8.5–10.1)
CHLORIDE SERPL-SCNC: 108 MMOL/L (ref 98–112)
CHOLEST SMN-MCNC: 290 MG/DL (ref ?–200)
CO2 SERPL-SCNC: 24 MMOL/L (ref 21–32)
COLOR UR AUTO: YELLOW
CREAT BLD-MCNC: 0.84 MG/DL (ref 0.55–1.02)
DEPRECATED RDW RBC AUTO: 51.1 FL (ref 35.1–46.3)
EOSINOPHIL # BLD AUTO: 0.11 X10(3) UL (ref 0–0.7)
EOSINOPHIL NFR BLD AUTO: 1.9 %
ERYTHROCYTE [DISTWIDTH] IN BLOOD BY AUTOMATED COUNT: 14.5 % (ref 11–15)
GLOBULIN PLAS-MCNC: 3.3 G/DL (ref 2.8–4.4)
GLUCOSE BLD-MCNC: 84 MG/DL (ref 70–99)
GLUCOSE UR STRIP.AUTO-MCNC: NEGATIVE MG/DL
HCT VFR BLD AUTO: 46.5 % (ref 35–48)
HDLC SERPL-MCNC: 116 MG/DL (ref 40–59)
HGB BLD-MCNC: 14.5 G/DL (ref 12–16)
IMM GRANULOCYTES # BLD AUTO: 0.01 X10(3) UL (ref 0–1)
IMM GRANULOCYTES NFR BLD: 0.2 %
KETONES UR STRIP.AUTO-MCNC: NEGATIVE MG/DL
LDLC SERPL CALC-MCNC: 155 MG/DL (ref ?–100)
LYMPHOCYTES # BLD AUTO: 2.28 X10(3) UL (ref 1–4)
LYMPHOCYTES NFR BLD AUTO: 40 %
M PROTEIN MFR SERPL ELPH: 6.9 G/DL (ref 6.4–8.2)
MCH RBC QN AUTO: 30 PG (ref 26–34)
MCHC RBC AUTO-ENTMCNC: 31.2 G/DL (ref 31–37)
MCV RBC AUTO: 96.3 FL (ref 80–100)
MONOCYTES # BLD AUTO: 0.42 X10(3) UL (ref 0.1–1)
MONOCYTES NFR BLD AUTO: 7.4 %
NEUTROPHILS # BLD AUTO: 2.84 X10 (3) UL (ref 1.5–7.7)
NEUTROPHILS # BLD AUTO: 2.84 X10(3) UL (ref 1.5–7.7)
NEUTROPHILS NFR BLD AUTO: 49.8 %
NITRITE UR QL STRIP.AUTO: NEGATIVE
NONHDLC SERPL-MCNC: 174 MG/DL (ref ?–130)
OSMOLALITY SERPL CALC.SUM OF ELEC: 289 MOSM/KG (ref 275–295)
PATIENT FASTING Y/N/NP: YES
PATIENT FASTING Y/N/NP: YES
PH UR STRIP.AUTO: 6 [PH] (ref 4.5–8)
PLATELET # BLD AUTO: 198 10(3)UL (ref 150–450)
POTASSIUM SERPL-SCNC: 4 MMOL/L (ref 3.5–5.1)
PROT UR STRIP.AUTO-MCNC: NEGATIVE MG/DL
RBC # BLD AUTO: 4.83 X10(6)UL (ref 3.8–5.3)
SODIUM SERPL-SCNC: 139 MMOL/L (ref 136–145)
SP GR UR STRIP.AUTO: 1.01 (ref 1–1.03)
TRIGL SERPL-MCNC: 95 MG/DL (ref 30–149)
TSI SER-ACNC: 1.38 MIU/ML (ref 0.36–3.74)
UROBILINOGEN UR STRIP.AUTO-MCNC: <2 MG/DL
VIT B12 SERPL-MCNC: 384 PG/ML (ref 193–986)
VIT D+METAB SERPL-MCNC: 15.5 NG/ML (ref 30–100)
VLDLC SERPL CALC-MCNC: 19 MG/DL (ref 0–30)
WBC # BLD AUTO: 5.7 X10(3) UL (ref 4–11)

## 2020-08-14 PROCEDURE — 80053 COMPREHEN METABOLIC PANEL: CPT

## 2020-08-14 PROCEDURE — 87086 URINE CULTURE/COLONY COUNT: CPT

## 2020-08-14 PROCEDURE — 81001 URINALYSIS AUTO W/SCOPE: CPT

## 2020-08-14 PROCEDURE — 84443 ASSAY THYROID STIM HORMONE: CPT

## 2020-08-14 PROCEDURE — 80061 LIPID PANEL: CPT

## 2020-08-14 PROCEDURE — 85025 COMPLETE CBC W/AUTO DIFF WBC: CPT

## 2020-08-14 PROCEDURE — 36415 COLL VENOUS BLD VENIPUNCTURE: CPT

## 2020-08-14 PROCEDURE — 82306 VITAMIN D 25 HYDROXY: CPT

## 2020-08-14 PROCEDURE — 82607 VITAMIN B-12: CPT

## 2020-08-14 RX ORDER — ESCITALOPRAM OXALATE 10 MG/1
TABLET ORAL
Qty: 90 TABLET | Refills: 3 | Status: SHIPPED | OUTPATIENT
Start: 2020-08-14 | End: 2021-02-23

## 2020-08-17 DIAGNOSIS — R31.1 BENIGN ESSENTIAL MICROSCOPIC HEMATURIA: Primary | ICD-10-CM

## 2020-08-17 DIAGNOSIS — E78.5 DYSLIPIDEMIA: ICD-10-CM

## 2020-08-17 DIAGNOSIS — E55.9 VITAMIN D DEFICIENCY: Primary | ICD-10-CM

## 2020-08-17 RX ORDER — ERGOCALCIFEROL 1.25 MG/1
50000 CAPSULE ORAL WEEKLY
Qty: 12 CAPSULE | Refills: 0 | Status: SHIPPED | OUTPATIENT
Start: 2020-08-17 | End: 2020-11-15

## 2020-08-17 RX ORDER — ROSUVASTATIN CALCIUM 5 MG/1
5 TABLET, COATED ORAL NIGHTLY
Qty: 90 TABLET | Refills: 0 | Status: SHIPPED | OUTPATIENT
Start: 2020-08-17 | End: 2020-12-03

## 2020-08-18 ENCOUNTER — HOSPITAL ENCOUNTER (OUTPATIENT)
Dept: MAMMOGRAPHY | Facility: HOSPITAL | Age: 80
Discharge: HOME | End: 2020-08-18
Attending: NURSE PRACTITIONER
Payer: MEDICARE

## 2020-08-18 DIAGNOSIS — N64.4 BREAST PAIN, RIGHT: ICD-10-CM

## 2020-08-18 DIAGNOSIS — N64.4 BREAST PAIN, LEFT: ICD-10-CM

## 2020-08-18 PROCEDURE — 77066 DX MAMMO INCL CAD BI: CPT | Performed by: NURSE PRACTITIONER

## 2020-08-18 PROCEDURE — 77062 BREAST TOMOSYNTHESIS BI: CPT | Performed by: NURSE PRACTITIONER

## 2020-08-18 PROCEDURE — 76641 ULTRASOUND BREAST COMPLETE: CPT | Performed by: NURSE PRACTITIONER

## 2020-08-20 RX ORDER — LOSARTAN POTASSIUM 25 MG/1
TABLET ORAL
Qty: 90 TABLET | Refills: 1 | Status: SHIPPED | OUTPATIENT
Start: 2020-08-20 | End: 2021-02-16

## 2020-08-28 ENCOUNTER — OFFICE VISIT (OUTPATIENT)
Dept: PODIATRY CLINIC | Facility: CLINIC | Age: 80
End: 2020-08-28
Payer: MEDICARE

## 2020-08-28 VITALS — DIASTOLIC BLOOD PRESSURE: 78 MMHG | SYSTOLIC BLOOD PRESSURE: 118 MMHG

## 2020-08-28 DIAGNOSIS — M79.672 FOOT PAIN, LEFT: ICD-10-CM

## 2020-08-28 DIAGNOSIS — M19.079 ARTHRITIS OF MIDFOOT: Primary | ICD-10-CM

## 2020-08-28 PROCEDURE — 20600 DRAIN/INJ JOINT/BURSA W/O US: CPT | Performed by: PODIATRIST

## 2020-08-28 RX ORDER — TRIAMCINOLONE ACETONIDE 40 MG/ML
20 INJECTION, SUSPENSION INTRA-ARTICULAR; INTRAMUSCULAR ONCE
Status: COMPLETED | OUTPATIENT
Start: 2020-08-28 | End: 2020-08-28

## 2020-08-28 NOTE — PROCEDURES
Per Dr Vicente Goodrich, draw up 0.5mL of 0.5% Marcaine and 0.5mL of Kenalog 40 for injection to  Left foot. Patient left office prior to obtaining post injection vitals.

## 2020-08-31 NOTE — PROGRESS NOTES
Guillermina Gottron is a 78year old female. Patient presents with: Foot Pain: left -- States last cortisone injection helped. Pain returned 3 weeks ago. Pain is interfering with sleep. Rates pain 7/10 most of the time with WB.          HPI:   Patient was seen t deficiency       Past Surgical History:   Procedure Laterality Date   • APPENDECTOMY     • COLONOSCOPY  1/21/05, 04/08/2015    Chacorta Zavala MD, [facility]   • KNEE ARTHROSCOPY Left 10/21/16--Dr. Shari Maki    partial medial and lateral meniscectomies   • REVIEW OF SYSTEMS:   Today reviewed systens as documented below  GENERAL HEALTH: feels well otherwise  SKIN: Refer to exam below  RESPIRATORY: denies shortness of breath with exertion  CARDIOVASCULAR: denies chest pain on exertion  GI: denies abdomi

## 2020-10-01 ENCOUNTER — TELEPHONE (OUTPATIENT)
Dept: INTERNAL MEDICINE CLINIC | Facility: CLINIC | Age: 80
End: 2020-10-01

## 2020-10-01 RX ORDER — ERGOCALCIFEROL 1.25 MG/1
CAPSULE ORAL
Qty: 12 CAPSULE | Refills: 0 | OUTPATIENT
Start: 2020-10-01

## 2020-10-01 NOTE — TELEPHONE ENCOUNTER
Last Ov: 8/12/20, SD, CPE  Last labs: CMP, Lipid, TSH w Ref, Vit D, Vit B12 8/14/20  Last Rx: ergocalciferol 84851zd, #12, 0R 8/17/20    Future Appointments   Date Time Provider Audie Dooley   10/2/2020 10:10 AM Dave Anderson MD [facility] NP ORTHO [facility]

## 2020-10-02 NOTE — TELEPHONE ENCOUNTER
Called patient and informed to get Vit D OTC and repeat labs mid November note. Patient is aware. Spoke with SORAIDA Moreno regarding OBS loc.  Tiffanie agreed to provide patient with FYWB-OBS Staya and document in EPIC.  MARTY Newman stated she will provide patient with MOON form.

## 2020-11-04 ENCOUNTER — IMMUNIZATION (OUTPATIENT)
Dept: INTERNAL MEDICINE CLINIC | Facility: CLINIC | Age: 80
End: 2020-11-04
Payer: MEDICARE

## 2020-11-04 DIAGNOSIS — Z23 NEED FOR VACCINATION: ICD-10-CM

## 2020-11-04 PROCEDURE — 90662 IIV NO PRSV INCREASED AG IM: CPT | Performed by: INTERNAL MEDICINE

## 2020-11-04 PROCEDURE — G0008 ADMIN INFLUENZA VIRUS VAC: HCPCS | Performed by: INTERNAL MEDICINE

## 2020-12-03 RX ORDER — ROSUVASTATIN CALCIUM 5 MG/1
TABLET, COATED ORAL
Qty: 90 TABLET | Refills: 1 | Status: SHIPPED | OUTPATIENT
Start: 2020-12-03 | End: 2021-04-06

## 2020-12-18 ENCOUNTER — OFFICE VISIT (OUTPATIENT)
Dept: PODIATRY CLINIC | Facility: CLINIC | Age: 80
End: 2020-12-18
Payer: MEDICARE

## 2020-12-18 VITALS
HEIGHT: 64 IN | BODY MASS INDEX: 25.78 KG/M2 | DIASTOLIC BLOOD PRESSURE: 70 MMHG | HEART RATE: 88 BPM | SYSTOLIC BLOOD PRESSURE: 118 MMHG | WEIGHT: 151 LBS

## 2020-12-18 DIAGNOSIS — M79.672 FOOT PAIN, LEFT: ICD-10-CM

## 2020-12-18 DIAGNOSIS — M19.079 ARTHRITIS OF MIDFOOT: Primary | ICD-10-CM

## 2020-12-18 PROCEDURE — 20600 DRAIN/INJ JOINT/BURSA W/O US: CPT | Performed by: PODIATRIST

## 2020-12-18 RX ORDER — TRIAMCINOLONE ACETONIDE 40 MG/ML
20 INJECTION, SUSPENSION INTRA-ARTICULAR; INTRAMUSCULAR ONCE
Status: COMPLETED | OUTPATIENT
Start: 2020-12-18 | End: 2020-12-18

## 2020-12-18 RX ADMIN — TRIAMCINOLONE ACETONIDE 20 MG: 40 INJECTION, SUSPENSION INTRA-ARTICULAR; INTRAMUSCULAR at 15:51:00

## 2020-12-18 NOTE — TELEPHONE ENCOUNTER
Last OV: 8/12/20 wellness visit    Future Appointments   Date Time Provider Audie Dooley   12/18/2020  3:45 PM Chris Rodriguez DPM [id number]        Latest labs: 8/14/20 CBC, CMP, Lipid, TSH, Vit D and Vit b12    Latest RX: VITAMIN D2 50,000

## 2020-12-19 RX ORDER — ERGOCALCIFEROL 1.25 MG/1
CAPSULE ORAL
Qty: 12 CAPSULE | Refills: 0 | OUTPATIENT
Start: 2020-12-19

## 2020-12-19 NOTE — TELEPHONE ENCOUNTER
Pt was to take Vit D 85728 int units daily X 12 weeks and then transition to 2000 int units daily. I denied the refill for this reason. She is due for repeat labs now. Please remind her to do.

## 2020-12-20 NOTE — PROGRESS NOTES
Lev Li is a 78year old female. Patient presents with: Foot Pain: left foot, wants injection        HPI:   Patient presents complaining of recurrent left foot pain wondering if she can get an injection.   At today's visit reviewed nurse's history as INJECTION UNDER FLUOROSCOPY RIGHT OR LEFT Right 3/4/2014    Performed by Deidre Goncalves MD at [facility] MAIN OR   • KNEE REPLACEMENT SURGERY     • KNEE SURGERY Left 04/01/2019    TKR   • OTHER  1961    bladder surgery, removal of calcified lesion   • OTHER EDITH and denies heartburn  NEURO: denies headaches    EXAM:   /70   Pulse 88   Ht 5' 4\" (1.626 m)   Wt 151 lb (68.5 kg)   LMP  (LMP Unknown)   BMI 25.92 kg/m²   GENERAL: well developed, well nourished, in no apparent distress  EXTREMITIES:   1.  Integumen

## 2021-01-04 RX ORDER — ALPRAZOLAM 0.25 MG/1
TABLET ORAL
Qty: 30 TABLET | Refills: 0 | Status: SHIPPED | OUTPATIENT
Start: 2021-01-04 | End: 2021-04-26

## 2021-01-04 NOTE — TELEPHONE ENCOUNTER
Last OV: 8/12/20 annual PE    No future appointments. Latest labs: 8/14/20 CBC, CMP, Lipid, TSH, Vit D and B12    Latest RX: ALPRAZOLAM 0.25MG TABLETS 30 tabs 0 refills on 8/12/20    Per protocol, not on protocol. Rx pending.

## 2021-02-16 RX ORDER — LOSARTAN POTASSIUM 25 MG/1
TABLET ORAL
Qty: 90 TABLET | Refills: 0 | Status: SHIPPED | OUTPATIENT
Start: 2021-02-16 | End: 2021-04-07

## 2021-02-16 NOTE — TELEPHONE ENCOUNTER
Last OV: 8/12/20 annual PE    No future appointments. Latest labs: 8/14/20 CBC. CMP, Lipid, TSH and Vit D &B12    Latest RX: Losartan- 90 tabs 1 refill on 8/20/20    Per protocol, failed due to Appointment in past 6 or next 3 months. Rx pending.

## 2021-02-19 NOTE — TELEPHONE ENCOUNTER
Future Appointments   Date Time Provider Audie Dooley   2/23/2021  8:20 AM ANASTACIO Dasilva [facility] [address] [facility]

## 2021-02-22 ENCOUNTER — TELEPHONE (OUTPATIENT)
Dept: INTERNAL MEDICINE CLINIC | Facility: CLINIC | Age: 81
End: 2021-02-22

## 2021-02-22 NOTE — TELEPHONE ENCOUNTER
Patient states for the last 3 months she has been using a cane to help with walking due to the need for a knee replacement, the right sided chest pain is between her sternum and her right breast, pain doesn't bother her all the time but is 'aware of it all

## 2021-02-22 NOTE — TELEPHONE ENCOUNTER
Called patient to do the travel screening for her appointment with Jeremiah Bains tomorrow. Patient told me during the call that she has chest pain that she believes is caused by her now using a cane.     She is scheduled for tomorrow but I am triaging the ca

## 2021-02-23 NOTE — PROGRESS NOTES
Miranda Villatoro is a 78year old female. Patient presents with: Follow - Up: KAREN rm 10      HPI:   Here with multiple issues.   She has been using a cane for the past several months due to unsteadiness due to worsening right knee osteoarthritis planning TKR aspirin 81 mg tablet,delayed release     • escitalopram 10 MG Oral Tab Take 1.5 tablets (15 mg total) by mouth daily.  135 tablet 1   • LOSARTAN POTASSIUM 25 MG Oral Tab TAKE 1 TABLET(25 MG) BY MOUTH DAILY 90 tablet 0   • ALPRAZOLAM 0.25 MG Oral Tab TAKE 1 HEALTH: feels well otherwise  As above   RESPIRATORY: denies shortness of breath with exertion, no cough  CARDIOVASCULAR: denies chest pain on exertion, no palpatations  GI: denies abdominal pain and denies heartburn, no diarrhea or constipation  MUSCULOSK

## 2021-03-12 DIAGNOSIS — Z23 NEED FOR VACCINATION: ICD-10-CM

## 2021-03-15 ENCOUNTER — TELEPHONE (OUTPATIENT)
Dept: INTERNAL MEDICINE CLINIC | Facility: CLINIC | Age: 81
End: 2021-03-15

## 2021-03-15 NOTE — TELEPHONE ENCOUNTER
Agree with decreasing to 1 tab. She can also hold for 5-7 days to see if that helps. If it is related to crestor, sleep should improve quickly off medication.   Can see me March 24 if she would like for f/u

## 2021-03-15 NOTE — TELEPHONE ENCOUNTER
Patient notified agree with decreasing to one tab daily but can also hold the medication for 5-7 days to see if that helps and if Crestor is the problem then sleep should improve quickly off the medication.   Pt schedule appt for 3/24/21 at 9 am 30 minutes

## 2021-03-15 NOTE — TELEPHONE ENCOUNTER
LOV 2/23/21 with SD.        Disp Refills Start   ROSUVASTATIN CALCIUM 5 MG Oral Tab 90 tablet 1 12/3/2020     Patient states she is taking Rosuvastatin 7.5mg daily(1.5 tablets) but doesn't remember when she increased to 1.5 tablets daily but believes since

## 2021-03-15 NOTE — TELEPHONE ENCOUNTER
Pt calling, since her rosuvastatin has been increased to a tablet and a half she has had difficulty sleeping. She would like to know if she can go back to taking just 1 tablet instead of 1.5 tablets.

## 2021-03-18 ENCOUNTER — LAB ENCOUNTER (OUTPATIENT)
Dept: LAB | Age: 81
End: 2021-03-18
Attending: Other
Payer: MEDICARE

## 2021-03-18 DIAGNOSIS — M17.11 PRIMARY OSTEOARTHRITIS OF RIGHT KNEE: ICD-10-CM

## 2021-03-18 DIAGNOSIS — E55.9 VITAMIN D DEFICIENCY: ICD-10-CM

## 2021-03-18 DIAGNOSIS — E53.8 VITAMIN B 12 DEFICIENCY: ICD-10-CM

## 2021-03-18 DIAGNOSIS — F41.9 ANXIETY AND DEPRESSION: ICD-10-CM

## 2021-03-18 DIAGNOSIS — E78.5 DYSLIPIDEMIA: ICD-10-CM

## 2021-03-18 DIAGNOSIS — F32.A ANXIETY AND DEPRESSION: ICD-10-CM

## 2021-03-18 DIAGNOSIS — I65.23 BILATERAL CAROTID ARTERY STENOSIS: ICD-10-CM

## 2021-03-18 DIAGNOSIS — R31.1 BENIGN ESSENTIAL MICROSCOPIC HEMATURIA: ICD-10-CM

## 2021-03-18 LAB
ALBUMIN SERPL-MCNC: 4 G/DL (ref 3.4–5)
ALBUMIN/GLOB SERPL: 1.2 {RATIO} (ref 1–2)
ALP LIVER SERPL-CCNC: 84 U/L
ALT SERPL-CCNC: 38 U/L
ANION GAP SERPL CALC-SCNC: 5 MMOL/L (ref 0–18)
AST SERPL-CCNC: 34 U/L (ref 15–37)
BASOPHILS # BLD AUTO: 0.09 X10(3) UL (ref 0–0.2)
BASOPHILS NFR BLD AUTO: 1.2 %
BILIRUB SERPL-MCNC: 0.7 MG/DL (ref 0.1–2)
BILIRUB UR QL STRIP.AUTO: NEGATIVE
BUN BLD-MCNC: 25 MG/DL (ref 7–18)
BUN/CREAT SERPL: 31.6 (ref 10–20)
CALCIUM BLD-MCNC: 9.8 MG/DL (ref 8.5–10.1)
CHLORIDE SERPL-SCNC: 110 MMOL/L (ref 98–112)
CHOLEST SMN-MCNC: 240 MG/DL (ref ?–200)
CO2 SERPL-SCNC: 27 MMOL/L (ref 21–32)
COLOR UR AUTO: YELLOW
CREAT BLD-MCNC: 0.79 MG/DL
DEPRECATED RDW RBC AUTO: 57.2 FL (ref 35.1–46.3)
EOSINOPHIL # BLD AUTO: 0.19 X10(3) UL (ref 0–0.7)
EOSINOPHIL NFR BLD AUTO: 2.6 %
ERYTHROCYTE [DISTWIDTH] IN BLOOD BY AUTOMATED COUNT: 15.5 % (ref 11–15)
GLOBULIN PLAS-MCNC: 3.4 G/DL (ref 2.8–4.4)
GLUCOSE BLD-MCNC: 93 MG/DL (ref 70–99)
GLUCOSE UR STRIP.AUTO-MCNC: NEGATIVE MG/DL
HCT VFR BLD AUTO: 45.9 %
HDLC SERPL-MCNC: 129 MG/DL (ref 40–59)
HGB BLD-MCNC: 14.3 G/DL
IMM GRANULOCYTES # BLD AUTO: 0.01 X10(3) UL (ref 0–1)
IMM GRANULOCYTES NFR BLD: 0.1 %
KETONES UR STRIP.AUTO-MCNC: NEGATIVE MG/DL
LDLC SERPL CALC-MCNC: 94 MG/DL (ref ?–100)
LYMPHOCYTES # BLD AUTO: 3.2 X10(3) UL (ref 1–4)
LYMPHOCYTES NFR BLD AUTO: 43.8 %
M PROTEIN MFR SERPL ELPH: 7.4 G/DL (ref 6.4–8.2)
MCH RBC QN AUTO: 31.2 PG (ref 26–34)
MCHC RBC AUTO-ENTMCNC: 31.2 G/DL (ref 31–37)
MCV RBC AUTO: 100 FL
MONOCYTES # BLD AUTO: 0.54 X10(3) UL (ref 0.1–1)
MONOCYTES NFR BLD AUTO: 7.4 %
NEUTROPHILS # BLD AUTO: 3.27 X10 (3) UL (ref 1.5–7.7)
NEUTROPHILS # BLD AUTO: 3.27 X10(3) UL (ref 1.5–7.7)
NEUTROPHILS NFR BLD AUTO: 44.9 %
NITRITE UR QL STRIP.AUTO: NEGATIVE
NONHDLC SERPL-MCNC: 111 MG/DL (ref ?–130)
OSMOLALITY SERPL CALC.SUM OF ELEC: 298 MOSM/KG (ref 275–295)
PATIENT FASTING Y/N/NP: YES
PATIENT FASTING Y/N/NP: YES
PH UR STRIP.AUTO: 6 [PH] (ref 5–8)
PLATELET # BLD AUTO: 208 10(3)UL (ref 150–450)
POTASSIUM SERPL-SCNC: 4 MMOL/L (ref 3.5–5.1)
RBC # BLD AUTO: 4.59 X10(6)UL
RBC #/AREA URNS AUTO: >10 /HPF
SODIUM SERPL-SCNC: 142 MMOL/L (ref 136–145)
SP GR UR STRIP.AUTO: 1.02 (ref 1–1.03)
TRIGL SERPL-MCNC: 86 MG/DL (ref 30–149)
UROBILINOGEN UR STRIP.AUTO-MCNC: 0.2 MG/DL
VIT B12 SERPL-MCNC: 383 PG/ML (ref 193–986)
VIT D+METAB SERPL-MCNC: 26.7 NG/ML (ref 30–100)
VLDLC SERPL CALC-MCNC: 17 MG/DL (ref 0–30)
WBC # BLD AUTO: 7.3 X10(3) UL (ref 4–11)

## 2021-03-18 PROCEDURE — 87086 URINE CULTURE/COLONY COUNT: CPT

## 2021-03-18 PROCEDURE — 82306 VITAMIN D 25 HYDROXY: CPT

## 2021-03-18 PROCEDURE — 36415 COLL VENOUS BLD VENIPUNCTURE: CPT

## 2021-03-18 PROCEDURE — 81001 URINALYSIS AUTO W/SCOPE: CPT

## 2021-03-18 PROCEDURE — 82607 VITAMIN B-12: CPT

## 2021-03-18 PROCEDURE — 80053 COMPREHEN METABOLIC PANEL: CPT

## 2021-03-18 PROCEDURE — 81015 MICROSCOPIC EXAM OF URINE: CPT

## 2021-03-18 PROCEDURE — 85025 COMPLETE CBC W/AUTO DIFF WBC: CPT

## 2021-03-18 PROCEDURE — 80061 LIPID PANEL: CPT

## 2021-03-18 PROCEDURE — 87077 CULTURE AEROBIC IDENTIFY: CPT

## 2021-03-18 PROCEDURE — 87186 SC STD MICRODIL/AGAR DIL: CPT

## 2021-04-06 ENCOUNTER — OFFICE VISIT (OUTPATIENT)
Dept: PODIATRY CLINIC | Facility: CLINIC | Age: 81
End: 2021-04-06
Payer: MEDICARE

## 2021-04-06 DIAGNOSIS — M79.672 FOOT PAIN, LEFT: ICD-10-CM

## 2021-04-06 DIAGNOSIS — M19.079 ARTHRITIS OF MIDFOOT: Primary | ICD-10-CM

## 2021-04-06 PROCEDURE — 20600 DRAIN/INJ JOINT/BURSA W/O US: CPT | Performed by: PODIATRIST

## 2021-04-06 RX ORDER — TRIAMCINOLONE ACETONIDE 40 MG/ML
20 INJECTION, SUSPENSION INTRA-ARTICULAR; INTRAMUSCULAR ONCE
Status: COMPLETED | OUTPATIENT
Start: 2021-04-06 | End: 2021-04-06

## 2021-04-06 RX ADMIN — TRIAMCINOLONE ACETONIDE 20 MG: 40 INJECTION, SUSPENSION INTRA-ARTICULAR; INTRAMUSCULAR at 15:00:00

## 2021-04-06 NOTE — PROCEDURES
Per Dr Irasema Carter, draw up 0.5mL of 0.5% Marcaine and 0.5mL of Kenalog 40 for injection to left foot. Patient left office prior to obtaining post injection vitals.

## 2021-04-07 ENCOUNTER — OFFICE VISIT (OUTPATIENT)
Dept: INTERNAL MEDICINE CLINIC | Facility: CLINIC | Age: 81
End: 2021-04-07
Payer: MEDICARE

## 2021-04-07 VITALS
SYSTOLIC BLOOD PRESSURE: 138 MMHG | HEART RATE: 72 BPM | WEIGHT: 153 LBS | DIASTOLIC BLOOD PRESSURE: 70 MMHG | TEMPERATURE: 98 F | HEIGHT: 64 IN | BODY MASS INDEX: 26.12 KG/M2

## 2021-04-07 DIAGNOSIS — F51.01 PRIMARY INSOMNIA: ICD-10-CM

## 2021-04-07 DIAGNOSIS — R03.0 ELEVATED BP WITHOUT DIAGNOSIS OF HYPERTENSION: Primary | ICD-10-CM

## 2021-04-07 DIAGNOSIS — F41.9 ANXIETY: ICD-10-CM

## 2021-04-07 DIAGNOSIS — M17.11 PRIMARY OSTEOARTHRITIS OF RIGHT KNEE: ICD-10-CM

## 2021-04-07 PROCEDURE — 99214 OFFICE O/P EST MOD 30 MIN: CPT | Performed by: NURSE PRACTITIONER

## 2021-04-07 NOTE — PROGRESS NOTES
Tanya Dasilva is a 78year old female. Patient presents with:  Sleep Problem: AJ rm 10 pt states trouble with sleeping. she feels \"wired\" at night      HPI:   Here for eval of difficulty sleeping.   She started losartan  She felt more anxious and had dif sinusitis, unspecified    • Bulging discs    • Encounter for screening colonoscopy 4/8/2015    Dr.Gonzalo English    • Headache(784.0)    • Herpes zoster without mention of complication    • Lumbago    • Myalgia and myositis, unspecified    • Personal his consider melatonin. Defers using xanax for sleep  Improved off losartan  Anxiety  Stable  contineus with increased stress at home with her 's illness. Lexapro. Primary osteoarthritis of right knee  She will try voltaren gel. Discussed use.

## 2021-04-08 NOTE — PROGRESS NOTES
Lev Li is a 78year old female. Patient presents with: Foot Pain: needs injection to left foot - pain scale 8/10 - takes advil as needed.         HPI:   Returns to the clinic she is having again severe left foot pain she points to the dorsal lateral and lateral meniscectomies   • KNEE JOINT INJECTION UNDER FLUOROSCOPY RIGHT OR LEFT Right 3/4/2014    Performed by Tammi Hinson MD at [address]   • KNEE REPLACEMENT SURGERY     • KNEE SURGERY Left 04/01/2019    TKR   • OTHER  1961    bladder surgery, heartburn  NEURO: denies headaches    EXAM:   LMP  (LMP Unknown)   GENERAL: well developed, well nourished, in no apparent distress  EXTREMITIES:   1. Integument: The skin on her left foot was evaluated is warm dry and supple   2.  Vascular: Pedal hair grow

## 2021-04-26 RX ORDER — ROSUVASTATIN CALCIUM 5 MG/1
TABLET, COATED ORAL
Qty: 90 TABLET | Refills: 1 | OUTPATIENT
Start: 2021-04-26

## 2021-04-26 RX ORDER — ALPRAZOLAM 0.25 MG/1
TABLET ORAL
Qty: 30 TABLET | Refills: 0 | Status: SHIPPED | OUTPATIENT
Start: 2021-04-26 | End: 2021-05-13

## 2021-04-26 NOTE — TELEPHONE ENCOUNTER
Last OV: 4/7/21 acute f/u    No future appointments. Latest labs: 3/18/21 Vit B12, CBC, CMP, Lipid and Vit D    Latest RX: 30tabs 0 refills on 1/4/21    Per protocol, ALPRAZOLAM 0.25MG TABLETS not on protocol. Rx pending.

## 2021-04-29 ENCOUNTER — TELEPHONE (OUTPATIENT)
Dept: INTERNAL MEDICINE CLINIC | Facility: CLINIC | Age: 81
End: 2021-04-29

## 2021-04-29 DIAGNOSIS — Z00.00 ROUTINE GENERAL MEDICAL EXAMINATION AT A HEALTH CARE FACILITY: Primary | ICD-10-CM

## 2021-04-29 DIAGNOSIS — E78.5 DYSLIPIDEMIA: ICD-10-CM

## 2021-04-29 DIAGNOSIS — E55.9 VITAMIN D DEFICIENCY: ICD-10-CM

## 2021-04-29 DIAGNOSIS — E53.8 VITAMIN B 12 DEFICIENCY: ICD-10-CM

## 2021-04-29 NOTE — TELEPHONE ENCOUNTER
Pt has Annual Wellness Exam scheduled 8/17/21 with SD. Will need lab orders entered. Last Wellness Exam was 8/12/20.

## 2021-05-13 ENCOUNTER — OFFICE VISIT (OUTPATIENT)
Dept: PODIATRY CLINIC | Facility: CLINIC | Age: 81
End: 2021-05-13
Payer: MEDICARE

## 2021-05-13 VITALS
HEIGHT: 64 IN | DIASTOLIC BLOOD PRESSURE: 74 MMHG | BODY MASS INDEX: 26.12 KG/M2 | SYSTOLIC BLOOD PRESSURE: 140 MMHG | WEIGHT: 153 LBS

## 2021-05-13 DIAGNOSIS — M79.672 FOOT PAIN, LEFT: ICD-10-CM

## 2021-05-13 DIAGNOSIS — M19.079 ARTHRITIS OF MIDFOOT: Primary | ICD-10-CM

## 2021-05-13 PROCEDURE — 99070 SPECIAL SUPPLIES PHYS/QHP: CPT | Performed by: PODIATRIST

## 2021-05-13 PROCEDURE — 20600 DRAIN/INJ JOINT/BURSA W/O US: CPT | Performed by: PODIATRIST

## 2021-05-13 RX ORDER — ALPRAZOLAM 0.25 MG/1
TABLET ORAL
Qty: 30 TABLET | Refills: 0 | Status: SHIPPED | OUTPATIENT
Start: 2021-05-13

## 2021-05-13 RX ORDER — TRIAMCINOLONE ACETONIDE 40 MG/ML
20 INJECTION, SUSPENSION INTRA-ARTICULAR; INTRAMUSCULAR ONCE
Status: COMPLETED | OUTPATIENT
Start: 2021-05-13 | End: 2021-05-13

## 2021-05-13 RX ADMIN — TRIAMCINOLONE ACETONIDE 20 MG: 40 INJECTION, SUSPENSION INTRA-ARTICULAR; INTRAMUSCULAR at 15:43:00

## 2021-05-13 NOTE — TELEPHONE ENCOUNTER
Last OV: 4/7/21 acute f/u    Future Appointments   Date Time Provider Audie Dooley   8/17/2021  1:00 PM ANASTACIO Huang [facility] [address] [facility]        Latest labs: 3/18/21 Vit D, Lipid, CMP, CBC and Vit B12    Latest RX: ALPRAZOLAM 0.25MG TABLETS 30

## 2021-05-14 NOTE — PROGRESS NOTES
Alie Chavez is a 78year old female. Patient presents with: Follow - Up: pain in the left midfoot ,9/10 at times         HPI:   Pleasant patient returns to clinic for follow-up on left foot pain can be very severe about 9 out of 10.   She has had virtual urinary bladder   • OTHER SURGICAL HISTORY  10/29/2019    cystoscopy   • SKIN SURGERY      BCC - face & R dorsal hand (treated by outside [facility] physician)   • TONSILLECTOMY      w/adenoidectomy   • UPPER GI ENDOSCOPY,BIOPSY      12-30-13 ron Rice fourth and fifth met cuboid joint.     ASSESSMENT AND PLAN:   Diagnoses and all orders for this visit:    Arthritis of midfoot  -     triamcinolone acetonide (KENALOG-40) 40 MG/ML injection 20 mg    Foot pain, left  -     triamcinolone acetonide (Marija Daniel

## 2021-07-08 RX ORDER — LOSARTAN POTASSIUM 25 MG/1
TABLET ORAL
Qty: 90 TABLET | Refills: 0 | Status: SHIPPED | OUTPATIENT
Start: 2021-07-08 | End: 2021-08-17

## 2021-07-08 RX ORDER — CEPHALEXIN 500 MG/1
CAPSULE ORAL
Qty: 14 CAPSULE | Refills: 0 | OUTPATIENT
Start: 2021-07-08

## 2021-07-08 NOTE — TELEPHONE ENCOUNTER
Last visit- 04/07/2021 elevated bp without diagnosis of hypertension     Last refill-  02/16/2021 losartan potassium 25mg QTY90 0R    Last labs-  03/18/2021 vitamin b12, cbc, cmp, lipid, vitamin d  Future Appointments   Date Time Provider Audie Dooley

## 2021-07-12 ENCOUNTER — OFFICE VISIT (OUTPATIENT)
Dept: PODIATRY CLINIC | Facility: CLINIC | Age: 81
End: 2021-07-12
Payer: MEDICARE

## 2021-07-12 VITALS — SYSTOLIC BLOOD PRESSURE: 128 MMHG | DIASTOLIC BLOOD PRESSURE: 72 MMHG

## 2021-07-12 DIAGNOSIS — M19.079 ARTHRITIS OF MIDFOOT: Primary | ICD-10-CM

## 2021-07-12 DIAGNOSIS — M79.672 FOOT PAIN, LEFT: ICD-10-CM

## 2021-07-12 PROCEDURE — 20600 DRAIN/INJ JOINT/BURSA W/O US: CPT | Performed by: PODIATRIST

## 2021-07-12 RX ORDER — TRIAMCINOLONE ACETONIDE 40 MG/ML
20 INJECTION, SUSPENSION INTRA-ARTICULAR; INTRAMUSCULAR ONCE
Status: COMPLETED | OUTPATIENT
Start: 2021-07-12 | End: 2021-07-12

## 2021-07-12 NOTE — PROGRESS NOTES
Kandis Martino is a 78year old female. Patient presents with: Foot Pain: Left foot pain, patient stated that cortisone injection worked last time.         HPI:   Pleasant patient returns to clinic for follow-up on recurrent left foot pain can be very sever 08/01/1962    Calcium deposit removed from urinary bladder   • OTHER SURGICAL HISTORY  10/29/2019    cystoscopy   • SKIN SURGERY      BCC - face & R dorsal hand (treated by outside [facility] physician)   • TONSILLECTOMY      w/adenoidectomy   • UPPER GI ENDOSCOP Patient again has pain on palpation of the fourth and fifth met cuboid joint. ASSESSMENT AND PLAN:   Diagnoses and all orders for this visit:    Arthritis of midfoot    Foot pain, left        Plan:  Today discussed the nature and extent of a cortisone in

## 2021-07-19 NOTE — TELEPHONE ENCOUNTER
Pt had Vit D Lipid CMP CBC Vit B12 done 3/18/21    Pending fasting labs for Edward per protocol, please sign if appropriate

## 2021-07-20 ENCOUNTER — OFFICE VISIT (OUTPATIENT)
Dept: INTERNAL MEDICINE CLINIC | Facility: CLINIC | Age: 81
End: 2021-07-20
Payer: MEDICARE

## 2021-07-20 VITALS
HEART RATE: 82 BPM | DIASTOLIC BLOOD PRESSURE: 78 MMHG | RESPIRATION RATE: 16 BRPM | TEMPERATURE: 97 F | SYSTOLIC BLOOD PRESSURE: 134 MMHG | WEIGHT: 150 LBS | BODY MASS INDEX: 25.61 KG/M2 | HEIGHT: 64 IN

## 2021-07-20 DIAGNOSIS — R07.9 CHEST PAIN, UNSPECIFIED TYPE: Primary | ICD-10-CM

## 2021-07-20 DIAGNOSIS — F41.9 ANXIETY AND DEPRESSION: ICD-10-CM

## 2021-07-20 DIAGNOSIS — L98.9 SKIN LESION: ICD-10-CM

## 2021-07-20 DIAGNOSIS — F32.A ANXIETY AND DEPRESSION: ICD-10-CM

## 2021-07-20 PROCEDURE — 99214 OFFICE O/P EST MOD 30 MIN: CPT | Performed by: NURSE PRACTITIONER

## 2021-07-20 PROCEDURE — 93000 ELECTROCARDIOGRAM COMPLETE: CPT | Performed by: NURSE PRACTITIONER

## 2021-07-20 PROCEDURE — 96127 BRIEF EMOTIONAL/BEHAV ASSMT: CPT

## 2021-07-20 RX ORDER — SERTRALINE HYDROCHLORIDE 25 MG/1
25 TABLET, FILM COATED ORAL DAILY
Qty: 90 TABLET | Refills: 0 | Status: SHIPPED | OUTPATIENT
Start: 2021-07-20 | End: 2021-08-17

## 2021-07-20 NOTE — PROGRESS NOTES
Sloan Vallejo is a 78year old female. Patient presents with: Anxiety: DD Rm 9, Anxiety x 3 weeks, Insomnia      HPI:   Right chest pain. Worse with palpation and certain movements. Some increase with deep inspiration.   About 3 weeks ago noted increase ANXIETY 30 tablet 0   • Clobetasol Propionate 0.05 % External Ointment JANEY AA ON SKIN BID FOR UP TO 2 WEEKS THEN TK A 2 WEEK BREAK  0   • ibuprofen 200 MG Oral Tab Take 200 mg by mouth every 6 (six) hours as needed for Pain.      • Mometasone Furoate 0.1 % growth appearing on flesh.   LUNGS: normal rate without respiratory distress, lungs clear to auscultation  CARDIO: RRR    EXTREMITIES: no edema, normal strength and tone  MS  Reproducible right chest wall pain just right sterunum  No palpable mass or rednes

## 2021-08-10 ENCOUNTER — OFFICE VISIT (OUTPATIENT)
Dept: PODIATRY CLINIC | Facility: CLINIC | Age: 81
End: 2021-08-10
Payer: MEDICARE

## 2021-08-10 VITALS
DIASTOLIC BLOOD PRESSURE: 66 MMHG | BODY MASS INDEX: 25.95 KG/M2 | SYSTOLIC BLOOD PRESSURE: 126 MMHG | WEIGHT: 152 LBS | HEIGHT: 64 IN

## 2021-08-10 DIAGNOSIS — M79.672 FOOT PAIN, LEFT: ICD-10-CM

## 2021-08-10 DIAGNOSIS — M19.079 ARTHRITIS OF MIDFOOT: Primary | ICD-10-CM

## 2021-08-10 PROCEDURE — 20600 DRAIN/INJ JOINT/BURSA W/O US: CPT | Performed by: PODIATRIST

## 2021-08-10 RX ORDER — TRIAMCINOLONE ACETONIDE 40 MG/ML
20 INJECTION, SUSPENSION INTRA-ARTICULAR; INTRAMUSCULAR ONCE
Status: COMPLETED | OUTPATIENT
Start: 2021-08-10 | End: 2021-08-10

## 2021-08-10 RX ADMIN — TRIAMCINOLONE ACETONIDE 20 MG: 40 INJECTION, SUSPENSION INTRA-ARTICULAR; INTRAMUSCULAR at 16:32:00

## 2021-08-10 NOTE — PROGRESS NOTES
Per Dr Lesa Leslie draw 0.5 ml 40 mg  Triamcinolone and 0.5ml of ropivacaine for left  Foot injection  Patient left the office without post vitals

## 2021-08-11 RX ORDER — TRIAMCINOLONE ACETONIDE 40 MG/ML
20 INJECTION, SUSPENSION INTRA-ARTICULAR; INTRAMUSCULAR ONCE
Status: SHIPPED | OUTPATIENT
Start: 2021-08-11

## 2021-08-11 NOTE — PROGRESS NOTES
Woody Urias is a 78year old female. Patient presents with:   Follow - Up: left foot pain ,patient would like another steroid injection today ,states her pain  can reach a 7-8/10 in the PM         HPI:   Pleasant patient returns to clinic for follow-up on KNEE SURGERY Left 04/01/2019    TKR   • OTHER  1961    bladder surgery, removal of calcified lesion   • OTHER SURGICAL HISTORY  1/2011    injection   • OTHER SURGICAL HISTORY N/A 08/01/1962    Calcium deposit removed from urinary bladder   • OTHER SURGICAL patient has palpable pulses both dorsalis pedis and posterior tibial on the left   3. Neurologic: Patient has intact sensorium there is no changes here   4. Musculoskeletal: Patient again has pain on palpation of the fourth and fifth met cuboid joint.     A

## 2021-08-17 ENCOUNTER — OFFICE VISIT (OUTPATIENT)
Dept: INTERNAL MEDICINE CLINIC | Facility: CLINIC | Age: 81
End: 2021-08-17
Payer: MEDICARE

## 2021-08-17 DIAGNOSIS — R31.29 MICROHEMATURIA: ICD-10-CM

## 2021-08-17 DIAGNOSIS — E53.8 VITAMIN B 12 DEFICIENCY: ICD-10-CM

## 2021-08-17 DIAGNOSIS — M77.50 TENDONITIS OF FOOT: ICD-10-CM

## 2021-08-17 DIAGNOSIS — I65.23 BILATERAL CAROTID ARTERY STENOSIS: ICD-10-CM

## 2021-08-17 DIAGNOSIS — Z85.828 HISTORY OF MALIGNANT NEOPLASM OF SKIN: ICD-10-CM

## 2021-08-17 DIAGNOSIS — M47.818 ARTHRITIS OF SACROILIAC JOINT: ICD-10-CM

## 2021-08-17 DIAGNOSIS — M85.88 OSTEOPENIA OF SPINE: ICD-10-CM

## 2021-08-17 DIAGNOSIS — M17.11 PRIMARY OSTEOARTHRITIS OF RIGHT KNEE: ICD-10-CM

## 2021-08-17 DIAGNOSIS — E78.5 DYSLIPIDEMIA: ICD-10-CM

## 2021-08-17 DIAGNOSIS — F41.9 ANXIETY: ICD-10-CM

## 2021-08-17 DIAGNOSIS — J30.9 ALLERGIC RHINITIS, UNSPECIFIED SEASONALITY, UNSPECIFIED TRIGGER: ICD-10-CM

## 2021-08-17 DIAGNOSIS — M47.816 LUMBAR FACET ARTHROPATHY: ICD-10-CM

## 2021-08-17 DIAGNOSIS — M79.672 LEFT FOOT PAIN: ICD-10-CM

## 2021-08-17 DIAGNOSIS — Z00.00 ENCOUNTER FOR ANNUAL HEALTH EXAMINATION: Primary | ICD-10-CM

## 2021-08-17 DIAGNOSIS — E55.9 VITAMIN D DEFICIENCY: ICD-10-CM

## 2021-08-17 DIAGNOSIS — M47.817 SPONDYLOSIS OF LUMBOSACRAL REGION WITHOUT MYELOPATHY OR RADICULOPATHY: ICD-10-CM

## 2021-08-17 DIAGNOSIS — Z96.652 HISTORY OF TOTAL LEFT KNEE REPLACEMENT (TKR): ICD-10-CM

## 2021-08-17 DIAGNOSIS — H91.93 BILATERAL HEARING LOSS, UNSPECIFIED HEARING LOSS TYPE: ICD-10-CM

## 2021-08-17 DIAGNOSIS — Z85.828 PERSONAL HISTORY OF OTHER MALIGNANT NEOPLASM OF SKIN: ICD-10-CM

## 2021-08-17 DIAGNOSIS — N64.4 BREAST PAIN, LEFT: ICD-10-CM

## 2021-08-17 DIAGNOSIS — R41.3 MEMORY CHANGE: ICD-10-CM

## 2021-08-17 DIAGNOSIS — Z98.890 S/P LEFT KNEE ARTHROSCOPY: ICD-10-CM

## 2021-08-17 PROCEDURE — 99214 OFFICE O/P EST MOD 30 MIN: CPT | Performed by: NURSE PRACTITIONER

## 2021-08-17 PROCEDURE — G0439 PPPS, SUBSEQ VISIT: HCPCS | Performed by: NURSE PRACTITIONER

## 2021-08-17 RX ORDER — LOSARTAN POTASSIUM 25 MG/1
25 TABLET ORAL DAILY
Qty: 90 TABLET | Refills: 1 | Status: SHIPPED | OUTPATIENT
Start: 2021-08-17

## 2021-08-17 NOTE — PROGRESS NOTES
HPI:   Roslyn Poster is a 78year old female who presents for a Medicare Subsequent Annual Wellness visit (Pt already had Initial Annual Wellness). Anxiety  Doing well with low dose sertraline. Open to increase to 50mg  Not taking PRN xanax.   Will co year: Annual Physical due on 2022         Fall/Risk Assessment   She has been screened for Falls and is low risk: Fall/Risk Scorin    Cognitive Assessment   She had a completely normal cognitive assessment- see flowsheet entries    Functional Magdiel Fierro joint     Osteoarthrosis involving lower leg     Carotid artery disease (HCC)     Personal history of other malignant neoplasm of skin     History of total left knee replacement (TKR)     Primary osteoarthritis of right knee     S/P left knee arthroscopy complication, Lumbago, Myalgia and myositis, unspecified, Personal history of other malignant neoplasm of skin (3/23/2016), Rash and other nonspecific skin eruption, Toxoplasmosis (1982), and Unspecified vitamin D deficiency.     She  has a past surgical hi Clear to auscultation bilaterally, respirations unlabored   Heart:  Regular rate and rhythm, S1 and S2 normal,    Abdomen:   Soft, non-tender, bowel sounds active all four quadrants,  no masses, no organomegaly   Pelvic: Deferred   Extremities: Extremitie Open to increase to 50mg  Not taking PRN xanax. Will continue to montior. Her husbands condition is worsening and this is stressful to her. They are consdiering assisted living. Bilateral carotid artery stenosis  Stable.      Dyslipidemia  Stable in understanding of these issues and agrees to the plan. Reinforced healthy diet, lifestyle, and exercise. No follow-ups on file.      Minnie Sarmiento, APRN, 8/17/2021     General Health     In the past six months, have you lost more than 10 pounds without tr Cancer Screening  Covered for ages 52-80; only need ONE of the following:    Colonoscopy   Covered every 10 years    Covered every 2 years if patient is at high risk or previous colonoscopy was abnormal 11/16/2018    Colonoscopy due on 11/16/2028    Flexib (ACE/ARB, digoxin diuretics, anticonvulsants)    Potassium Annually Lab Results   Component Value Date    K 4.0 03/18/2021         Creatinine   Annually Lab Results   Component Value Date    CREATSERUM 0.79 03/18/2021         BUN Annually Lab Results   Com

## 2021-08-17 NOTE — PATIENT INSTRUCTIONS
Denise Mckeon's SCREENING SCHEDULE   Tests on this list are recommended by your physician but may not be covered, or covered at this frequency, by your insurer. Please check with your insurance carrier before scheduling to verify coverage.    PREVENTATI 03/30/2018      No recommendations at this time   Pap and Pelvic    Pap   Covered every 2 years for women at normal risk;  Annually if at high risk -  No recommendations at this time    Chlamydia Annually if high risk -  No recommendations at this time   Sc http://www. idph.state. [place]/public/books/advin.htm  A link to the [employer]. This site has a lot of good information including definitions of the different types of Advance Directives.  It also has the State forms available on it's webs

## 2021-11-04 ENCOUNTER — OFFICE VISIT (OUTPATIENT)
Dept: PODIATRY CLINIC | Facility: CLINIC | Age: 81
End: 2021-11-04
Payer: MEDICARE

## 2021-11-04 VITALS — SYSTOLIC BLOOD PRESSURE: 130 MMHG | DIASTOLIC BLOOD PRESSURE: 60 MMHG

## 2021-11-04 DIAGNOSIS — M19.079 ARTHRITIS OF MIDFOOT: Primary | ICD-10-CM

## 2021-11-04 DIAGNOSIS — M79.672 FOOT PAIN, LEFT: ICD-10-CM

## 2021-11-04 PROCEDURE — 20600 DRAIN/INJ JOINT/BURSA W/O US: CPT | Performed by: PODIATRIST

## 2021-11-04 RX ORDER — TRIAMCINOLONE ACETONIDE 40 MG/ML
20 INJECTION, SUSPENSION INTRA-ARTICULAR; INTRAMUSCULAR ONCE
Status: COMPLETED | OUTPATIENT
Start: 2021-11-04 | End: 2021-11-04

## 2021-11-04 NOTE — PROGRESS NOTES
Nancy Schulz is a 78year old female. Patient presents with: Follow - Up: Left foot pain, pt states still having discomfort while walking standing. rates pain today 4-5/10, pt was giving a cortisone inj- on 8/10/21.          HPI:   Pleasant patient return KNEE REPLACEMENT SURGERY     • KNEE SURGERY Left 04/01/2019    TKR   • OTHER  1961    bladder surgery, removal of calcified lesion   • OTHER SURGICAL HISTORY  1/2011    injection   • OTHER SURGICAL HISTORY N/A 08/01/1962    Calcium deposit removed from [facility] there is no changes here   4. Musculoskeletal: Patient again has pain on palpation of the fourth and fifth met cuboid joint.     ASSESSMENT AND PLAN:   Diagnoses and all orders for this visit:    Arthritis of midfoot  -     triamcinolone acetonide (KENALOG-

## 2021-12-09 ENCOUNTER — TELEPHONE (OUTPATIENT)
Dept: PODIATRY CLINIC | Facility: CLINIC | Age: 81
End: 2021-12-09

## 2021-12-09 ENCOUNTER — OFFICE VISIT (OUTPATIENT)
Dept: PODIATRY CLINIC | Facility: CLINIC | Age: 81
End: 2021-12-09
Payer: MEDICARE

## 2021-12-09 VITALS — DIASTOLIC BLOOD PRESSURE: 76 MMHG | SYSTOLIC BLOOD PRESSURE: 119 MMHG

## 2021-12-09 DIAGNOSIS — M79.672 FOOT PAIN, LEFT: ICD-10-CM

## 2021-12-09 DIAGNOSIS — M19.079 ARTHRITIS OF MIDFOOT: Primary | ICD-10-CM

## 2021-12-09 PROCEDURE — 20600 DRAIN/INJ JOINT/BURSA W/O US: CPT | Performed by: PODIATRIST

## 2021-12-09 RX ORDER — TRIAMCINOLONE ACETONIDE 40 MG/ML
20 INJECTION, SUSPENSION INTRA-ARTICULAR; INTRAMUSCULAR ONCE
Status: COMPLETED | OUTPATIENT
Start: 2021-12-09 | End: 2021-12-09

## 2021-12-09 NOTE — TELEPHONE ENCOUNTER
Per pt has an appointment today and had her last injection 11/4 and asking if this is too soon.  Please advise

## 2021-12-09 NOTE — PROGRESS NOTES
Jenn Morris is a 78year old female. Patient presents with: Injection: LOV was 11/4/21 patient recv'd cortisone injection, patient states that it lasted for a little over 2 weeks.  Patient would like another cortisone injection today, patient rates pain Laterality Date   • APPENDECTOMY     • COLONOSCOPY  1/21/05, 04/08/2015    Christian Chavez MD, [facility]   • KNEE ARTHROSCOPY Left 10/21/16--Dr. Vick Child    partial medial and lateral meniscectomies   • KNEE REPLACEMENT SURGERY     • KNEE SURGERY Left 04 Integument: The skin of the left foot is warm, dry and supple   2. Vascular: The patient has palpable pulses both dorsalis pedis and posterior tibial on the left   3. Neurologic: Patient has intact sensorium there is no changes here   4.  Musculoskeletal: P

## 2021-12-09 NOTE — PROGRESS NOTES
Per Dr. Bernardo Miller draw up 0.5ml of 0.5% Marcaine and 0.5ml of Kenalog 40 for injection to left foot.

## 2021-12-21 ENCOUNTER — TELEPHONE (OUTPATIENT)
Dept: INTERNAL MEDICINE CLINIC | Facility: CLINIC | Age: 81
End: 2021-12-21

## 2021-12-21 NOTE — TELEPHONE ENCOUNTER
Rx denied. Latest RX: SERTRALINE 50MG TABLETS 90 tabs 1 refill on 8/17/21    Per protocol, not on protocol. Rx denied too soon to fill.

## 2021-12-22 NOTE — TELEPHONE ENCOUNTER
Patient is calling and said her medication was denied    - SERTRALINE 50 MG Oral Tab   She said she is sure she has refills.   Please advise

## 2021-12-28 ENCOUNTER — OFFICE VISIT (OUTPATIENT)
Dept: PODIATRY CLINIC | Facility: CLINIC | Age: 81
End: 2021-12-28
Payer: MEDICARE

## 2021-12-28 VITALS — SYSTOLIC BLOOD PRESSURE: 110 MMHG | DIASTOLIC BLOOD PRESSURE: 78 MMHG

## 2021-12-28 DIAGNOSIS — M19.079 ARTHRITIS OF MIDFOOT: Primary | ICD-10-CM

## 2021-12-28 DIAGNOSIS — M79.672 FOOT PAIN, LEFT: ICD-10-CM

## 2021-12-28 PROCEDURE — 20600 DRAIN/INJ JOINT/BURSA W/O US: CPT | Performed by: PODIATRIST

## 2021-12-28 RX ORDER — TRIAMCINOLONE ACETONIDE 40 MG/ML
20 INJECTION, SUSPENSION INTRA-ARTICULAR; INTRAMUSCULAR ONCE
Status: COMPLETED | OUTPATIENT
Start: 2021-12-28 | End: 2021-12-28

## 2021-12-28 NOTE — PROGRESS NOTES
Silvano Francis is a 78year old female. Patient presents with: Foot Pain: Left foot pain. LOV 12/9 received cortisone injection. 8-9/10 pain in office today. no swelling or redness.  Patient would like another cortisone injection today        HPI:   Janelle Diego COLONOSCOPY  1/21/05, 04/08/2015    Giovanna Osborne MD, Carolyn Tristan   • KNEE ARTHROSCOPY Left 10/21/16--Dr. Pierce Ward    partial medial and lateral meniscectomies   • KNEE REPLACEMENT SURGERY     • KNEE SURGERY Left 04/01/2019    TKR   • OTHER  1961    cm warm, dry and supple   2. Vascular: The patient has palpable pulses both dorsalis pedis and posterior tibial on the left   3. Neurologic: Patient has intact sensorium there is no changes here   4.  Musculoskeletal: Patient again has pain on palpation of the

## 2021-12-28 NOTE — PROGRESS NOTES
Per Dr. Clau West draw up 0.5ml of 0.5% Marcaine and 0.5ml of Kenalog 40 for injection to left foot.

## 2022-01-13 ENCOUNTER — TELEPHONE (OUTPATIENT)
Dept: PODIATRY CLINIC | Facility: CLINIC | Age: 82
End: 2022-01-13

## 2022-01-13 NOTE — TELEPHONE ENCOUNTER
Patient received injections 11/04, 12/09 and 12/28. Dr. Refugio Max please advise on frequency of cortisone injections.

## 2022-02-22 ENCOUNTER — OFFICE VISIT (OUTPATIENT)
Dept: PODIATRY CLINIC | Facility: CLINIC | Age: 82
End: 2022-02-22
Payer: MEDICARE

## 2022-02-22 VITALS — SYSTOLIC BLOOD PRESSURE: 132 MMHG | DIASTOLIC BLOOD PRESSURE: 68 MMHG

## 2022-02-22 DIAGNOSIS — M79.672 FOOT PAIN, LEFT: ICD-10-CM

## 2022-02-22 DIAGNOSIS — M19.079 ARTHRITIS OF MIDFOOT: Primary | ICD-10-CM

## 2022-02-22 PROCEDURE — 20600 DRAIN/INJ JOINT/BURSA W/O US: CPT | Performed by: PODIATRIST

## 2022-02-22 RX ORDER — TRIAMCINOLONE ACETONIDE 40 MG/ML
20 INJECTION, SUSPENSION INTRA-ARTICULAR; INTRAMUSCULAR ONCE
Status: COMPLETED | OUTPATIENT
Start: 2022-02-22 | End: 2022-02-22

## 2022-02-22 NOTE — PROGRESS NOTES
Per Dr. Leonidas Veloz draw up 0.5ml of 0.5% Marcaine and 0.5ml of Kenalog 40 for injection to left foot.

## 2022-02-26 ENCOUNTER — TELEPHONE (OUTPATIENT)
Dept: PODIATRY CLINIC | Facility: CLINIC | Age: 82
End: 2022-02-26

## 2022-02-26 NOTE — TELEPHONE ENCOUNTER
Per pt would like to know if PT would be a possibility for issue.  Pt made aware clinical staff will be back Monday, please advise

## 2022-03-02 NOTE — TELEPHONE ENCOUNTER
S/w pt and informed her per Dr. Bernardo Magana orders. Gave her phone # to PT scheduling through archibald.  She had no further q's

## 2022-03-02 NOTE — TELEPHONE ENCOUNTER
Please let the patient know that I prescribed 3 weeks of physical therapy 2 times weekly in an effort to see if it can help thank you

## 2022-03-22 RX ORDER — LOSARTAN POTASSIUM 25 MG/1
TABLET ORAL
Qty: 90 TABLET | Refills: 1 | Status: SHIPPED | OUTPATIENT
Start: 2022-03-22

## 2022-03-22 NOTE — TELEPHONE ENCOUNTER
Last OV: 8/17/21 annual PE    No future appointments. Latest labs: 3/18/21 Vit B12, CBC, CMP, Lipid and Vit D    Latest RX: losartan and sertraline- 90 tabs 1 refill on 8/17/21    Per protocol, sertraline not on protocol. Rx pending. Per protocol, losartan failed due to below. Rx pending.    CMP or BMP in past 12 months    Appointment in past 6 or next 3 months

## 2022-04-11 ENCOUNTER — TELEPHONE (OUTPATIENT)
Dept: INTERNAL MEDICINE CLINIC | Facility: CLINIC | Age: 82
End: 2022-04-11

## 2022-04-11 NOTE — TELEPHONE ENCOUNTER
Patient notified we are following the CDC Guidelines and are recommending the COVID booster. Pt verbalizes understanding.

## 2022-04-11 NOTE — TELEPHONE ENCOUNTER
Due to the new CDC guidelines. would to get another one booster she would like to know SD recommendations.

## 2022-04-21 ENCOUNTER — OFFICE VISIT (OUTPATIENT)
Dept: PODIATRY CLINIC | Facility: CLINIC | Age: 82
End: 2022-04-21
Payer: MEDICARE

## 2022-04-21 VITALS — DIASTOLIC BLOOD PRESSURE: 84 MMHG | SYSTOLIC BLOOD PRESSURE: 132 MMHG

## 2022-04-21 DIAGNOSIS — M19.072 ARTHROSIS OF MIDFOOT, LEFT: Primary | ICD-10-CM

## 2022-04-21 DIAGNOSIS — M79.672 FOOT PAIN, LEFT: ICD-10-CM

## 2022-04-21 PROCEDURE — 20600 DRAIN/INJ JOINT/BURSA W/O US: CPT | Performed by: PODIATRIST

## 2022-04-21 RX ORDER — TRIAMCINOLONE ACETONIDE 40 MG/ML
20 INJECTION, SUSPENSION INTRA-ARTICULAR; INTRAMUSCULAR ONCE
Status: COMPLETED | OUTPATIENT
Start: 2022-04-21 | End: 2022-04-21

## 2022-04-21 NOTE — PROGRESS NOTES
Per Dr. José Antonio Manzo draw up 0.5ml of 0.5% Marcaine and 0.5ml of Kenalog 40 for injection to left foot.

## 2022-05-08 NOTE — PROGRESS NOTES
Dx: Total knee replacement status, left (S74.904)         Authorized # of Visits:  8         Next MD visit: none scheduled  Fall Risk: standard         Precautions: n/a             Subjective: Knee is feeling good, just a little tired.       Objective: Seen 2\" step                            Skilled Services: TE, education , PROM, CKC,     Charges: 3 TE        Total Timed Treatment: 40 min  Total Treatment Time: 45 min No/Not applicable

## 2022-05-13 ENCOUNTER — TELEPHONE (OUTPATIENT)
Dept: PODIATRY CLINIC | Facility: CLINIC | Age: 82
End: 2022-05-13

## 2022-05-13 NOTE — TELEPHONE ENCOUNTER
Pt calling just had injection on 4/21 states she has pain when she walks in left foot asking does MD do stem cell injection please advise

## 2022-05-13 NOTE — TELEPHONE ENCOUNTER
Spoke with patient advised that we do not do stem cell injections. Patient would still like to see Dr. Lesa Hagen again appt scheduled for 5/24/22.

## 2022-05-24 ENCOUNTER — OFFICE VISIT (OUTPATIENT)
Dept: PODIATRY CLINIC | Facility: CLINIC | Age: 82
End: 2022-05-24
Payer: MEDICARE

## 2022-05-24 VITALS — DIASTOLIC BLOOD PRESSURE: 70 MMHG | SYSTOLIC BLOOD PRESSURE: 122 MMHG | HEART RATE: 84 BPM

## 2022-05-24 DIAGNOSIS — M79.672 FOOT PAIN, LEFT: ICD-10-CM

## 2022-05-24 DIAGNOSIS — M19.072 ARTHROSIS OF MIDFOOT, LEFT: ICD-10-CM

## 2022-05-24 DIAGNOSIS — M19.079 ARTHRITIS OF MIDFOOT: ICD-10-CM

## 2022-05-24 PROCEDURE — 20600 DRAIN/INJ JOINT/BURSA W/O US: CPT | Performed by: PODIATRIST

## 2022-05-24 RX ORDER — TRIAMCINOLONE ACETONIDE 40 MG/ML
20 INJECTION, SUSPENSION INTRA-ARTICULAR; INTRAMUSCULAR ONCE
Status: COMPLETED | OUTPATIENT
Start: 2022-05-24 | End: 2022-05-24

## 2022-05-24 NOTE — PROGRESS NOTES
Per Dr. Azar Wilson draw up 0.5ml of 0.5% Marcaine and 0.5ml of Kenalog 40 for injection to left foot.

## 2022-06-02 ENCOUNTER — TELEPHONE (OUTPATIENT)
Dept: PODIATRY CLINIC | Facility: CLINIC | Age: 82
End: 2022-06-02

## 2022-06-02 NOTE — TELEPHONE ENCOUNTER
Spoke with patient let her know that it would be best to schedule surgery after 8/24/22 as she had cortisone injection 5/24/22.

## 2022-06-02 NOTE — TELEPHONE ENCOUNTER
Per pt is considering foot surgery, states she has questions prior to scheduling, requesting to speak to RN. Please call thank you.

## 2022-06-13 NOTE — TELEPHONE ENCOUNTER
Called patient this date and let her know I would discuss further with Dr. Eliza Cazares and get the necessary surgical scheduling information from him and then I would call her back to proceed with scheduling.

## 2022-06-20 ENCOUNTER — TELEPHONE (OUTPATIENT)
Dept: PODIATRY CLINIC | Facility: CLINIC | Age: 82
End: 2022-06-20

## 2022-06-20 DIAGNOSIS — M19.079 ARTHRITIS OF MIDFOOT: ICD-10-CM

## 2022-06-20 DIAGNOSIS — M79.672 FOOT PAIN, LEFT: ICD-10-CM

## 2022-06-20 DIAGNOSIS — M19.072 ARTHROSIS OF MIDFOOT, LEFT: Primary | ICD-10-CM

## 2022-06-20 NOTE — TELEPHONE ENCOUNTER
At patient's request, called her daughter, Donald Lopez at [phone], to schedule procedure.   Left message for her to call me back directly at [phone]

## 2022-06-20 NOTE — TELEPHONE ENCOUNTER
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

## 2022-07-13 ENCOUNTER — TELEPHONE (OUTPATIENT)
Dept: INTERNAL MEDICINE CLINIC | Facility: CLINIC | Age: 82
End: 2022-07-13

## 2022-07-13 DIAGNOSIS — E78.5 DYSLIPIDEMIA: ICD-10-CM

## 2022-07-13 DIAGNOSIS — Z00.00 ROUTINE GENERAL MEDICAL EXAMINATION AT A HEALTH CARE FACILITY: Primary | ICD-10-CM

## 2022-07-13 DIAGNOSIS — M85.88 OSTEOPENIA OF SPINE: ICD-10-CM

## 2022-07-13 DIAGNOSIS — E55.9 VITAMIN D DEFICIENCY: ICD-10-CM

## 2022-07-13 DIAGNOSIS — E53.8 VITAMIN B 12 DEFICIENCY: ICD-10-CM

## 2022-07-13 NOTE — TELEPHONE ENCOUNTER
Future Appointments   Date Time Provider Audie Soumya   2022 11:00 AM ANASTACIO Marroquin [facility]     Orders to      [address]       aware must fast no call back required        Current orders will  before pt gets them done in august

## 2022-07-22 ENCOUNTER — TELEPHONE (OUTPATIENT)
Dept: PODIATRY CLINIC | Facility: CLINIC | Age: 82
End: 2022-07-22

## 2022-07-25 NOTE — TELEPHONE ENCOUNTER
Called patient this date and patient does not want to reschedule 8/26/22 she just wanted to confirm-see previous surgery schedule encounter.

## 2022-08-19 ENCOUNTER — OFFICE VISIT (OUTPATIENT)
Dept: INTERNAL MEDICINE CLINIC | Facility: CLINIC | Age: 82
End: 2022-08-19
Payer: MEDICARE

## 2022-08-19 VITALS
HEART RATE: 87 BPM | SYSTOLIC BLOOD PRESSURE: 116 MMHG | TEMPERATURE: 97 F | OXYGEN SATURATION: 97 % | HEIGHT: 64 IN | WEIGHT: 147.19 LBS | RESPIRATION RATE: 16 BRPM | DIASTOLIC BLOOD PRESSURE: 66 MMHG | BODY MASS INDEX: 25.13 KG/M2

## 2022-08-19 DIAGNOSIS — M17.11 PRIMARY OSTEOARTHRITIS OF RIGHT KNEE: ICD-10-CM

## 2022-08-19 DIAGNOSIS — E53.8 VITAMIN B 12 DEFICIENCY: ICD-10-CM

## 2022-08-19 DIAGNOSIS — Z11.1 SCREENING-PULMONARY TB: ICD-10-CM

## 2022-08-19 DIAGNOSIS — J30.9 ALLERGIC RHINITIS, UNSPECIFIED SEASONALITY, UNSPECIFIED TRIGGER: ICD-10-CM

## 2022-08-19 DIAGNOSIS — Z00.00 ENCOUNTER FOR ANNUAL HEALTH EXAMINATION: Primary | ICD-10-CM

## 2022-08-19 DIAGNOSIS — M77.50 TENDONITIS OF FOOT: ICD-10-CM

## 2022-08-19 DIAGNOSIS — Z85.828 HISTORY OF MALIGNANT NEOPLASM OF SKIN: ICD-10-CM

## 2022-08-19 DIAGNOSIS — Z96.652 HISTORY OF TOTAL LEFT KNEE REPLACEMENT (TKR): ICD-10-CM

## 2022-08-19 DIAGNOSIS — M47.817 SPONDYLOSIS OF LUMBOSACRAL REGION WITHOUT MYELOPATHY OR RADICULOPATHY: ICD-10-CM

## 2022-08-19 DIAGNOSIS — E78.5 DYSLIPIDEMIA: ICD-10-CM

## 2022-08-19 DIAGNOSIS — M47.818 ARTHRITIS OF SACROILIAC JOINT: ICD-10-CM

## 2022-08-19 DIAGNOSIS — M85.88 OSTEOPENIA OF SPINE: ICD-10-CM

## 2022-08-19 DIAGNOSIS — R41.3 MEMORY CHANGE: ICD-10-CM

## 2022-08-19 DIAGNOSIS — F41.9 ANXIETY: ICD-10-CM

## 2022-08-19 DIAGNOSIS — M79.672 LEFT FOOT PAIN: ICD-10-CM

## 2022-08-19 DIAGNOSIS — R31.29 MICROHEMATURIA: ICD-10-CM

## 2022-08-19 DIAGNOSIS — E55.9 VITAMIN D DEFICIENCY: ICD-10-CM

## 2022-08-19 DIAGNOSIS — I65.23 BILATERAL CAROTID ARTERY STENOSIS: ICD-10-CM

## 2022-08-19 DIAGNOSIS — M47.816 LUMBAR FACET ARTHROPATHY: ICD-10-CM

## 2022-08-19 PROCEDURE — G0439 PPPS, SUBSEQ VISIT: HCPCS | Performed by: NURSE PRACTITIONER

## 2022-08-19 PROCEDURE — 99214 OFFICE O/P EST MOD 30 MIN: CPT | Performed by: NURSE PRACTITIONER

## 2022-08-19 RX ORDER — IBUPROFEN 200 MG
200 TABLET ORAL EVERY 8 HOURS PRN
Refills: 0 | COMMUNITY
Start: 2022-08-19

## 2022-08-19 RX ORDER — ACETAMINOPHEN 500 MG
500 TABLET ORAL 3 TIMES DAILY PRN
Refills: 0 | COMMUNITY
Start: 2022-08-19

## 2022-08-22 ENCOUNTER — PATIENT MESSAGE (OUTPATIENT)
Dept: INTERNAL MEDICINE CLINIC | Facility: CLINIC | Age: 82
End: 2022-08-22

## 2022-08-22 ENCOUNTER — LAB ENCOUNTER (OUTPATIENT)
Dept: LAB | Age: 82
End: 2022-08-22
Attending: NURSE PRACTITIONER
Payer: MEDICARE

## 2022-08-22 DIAGNOSIS — M85.88 OSTEOPENIA OF SPINE: ICD-10-CM

## 2022-08-22 DIAGNOSIS — R31.29 MICROHEMATURIA: ICD-10-CM

## 2022-08-22 DIAGNOSIS — Z00.00 ROUTINE GENERAL MEDICAL EXAMINATION AT A HEALTH CARE FACILITY: ICD-10-CM

## 2022-08-22 DIAGNOSIS — Z11.1 SCREENING-PULMONARY TB: ICD-10-CM

## 2022-08-22 DIAGNOSIS — E78.5 DYSLIPIDEMIA: ICD-10-CM

## 2022-08-22 DIAGNOSIS — E55.9 VITAMIN D DEFICIENCY: ICD-10-CM

## 2022-08-22 DIAGNOSIS — E53.8 VITAMIN B 12 DEFICIENCY: ICD-10-CM

## 2022-08-22 LAB
ALBUMIN SERPL-MCNC: 3.5 G/DL (ref 3.4–5)
ALBUMIN/GLOB SERPL: 1 {RATIO} (ref 1–2)
ALP LIVER SERPL-CCNC: 81 U/L
ALT SERPL-CCNC: 20 U/L
ANION GAP SERPL CALC-SCNC: 7 MMOL/L (ref 0–18)
AST SERPL-CCNC: 19 U/L (ref 15–37)
BASOPHILS # BLD AUTO: 0.05 X10(3) UL (ref 0–0.2)
BASOPHILS NFR BLD AUTO: 0.8 %
BILIRUB SERPL-MCNC: 0.6 MG/DL (ref 0.1–2)
BILIRUB UR QL: NEGATIVE
BUN BLD-MCNC: 21 MG/DL (ref 7–18)
BUN/CREAT SERPL: 24.7 (ref 10–20)
CALCIUM BLD-MCNC: 9.3 MG/DL (ref 8.5–10.1)
CHLORIDE SERPL-SCNC: 107 MMOL/L (ref 98–112)
CHOLEST SERPL-MCNC: 258 MG/DL (ref ?–200)
CO2 SERPL-SCNC: 24 MMOL/L (ref 21–32)
COLOR UR: YELLOW
CREAT BLD-MCNC: 0.85 MG/DL
DEPRECATED RDW RBC AUTO: 52.4 FL (ref 35.1–46.3)
EOSINOPHIL # BLD AUTO: 0.07 X10(3) UL (ref 0–0.7)
EOSINOPHIL NFR BLD AUTO: 1.2 %
ERYTHROCYTE [DISTWIDTH] IN BLOOD BY AUTOMATED COUNT: 14.4 % (ref 11–15)
FASTING PATIENT LIPID ANSWER: YES
FASTING STATUS PATIENT QL REPORTED: YES
GFR SERPLBLD BASED ON 1.73 SQ M-ARVRAT: 68 ML/MIN/1.73M2 (ref 60–?)
GLOBULIN PLAS-MCNC: 3.4 G/DL (ref 2.8–4.4)
GLUCOSE BLD-MCNC: 101 MG/DL (ref 70–99)
GLUCOSE UR-MCNC: NEGATIVE MG/DL
HCT VFR BLD AUTO: 44.4 %
HDLC SERPL-MCNC: 128 MG/DL (ref 40–59)
HGB BLD-MCNC: 14.1 G/DL
IMM GRANULOCYTES # BLD AUTO: 0.01 X10(3) UL (ref 0–1)
IMM GRANULOCYTES NFR BLD: 0.2 %
KETONES UR-MCNC: NEGATIVE MG/DL
LDLC SERPL CALC-MCNC: 118 MG/DL (ref ?–100)
LYMPHOCYTES # BLD AUTO: 1.96 X10(3) UL (ref 1–4)
LYMPHOCYTES NFR BLD AUTO: 32.7 %
MCH RBC QN AUTO: 31.3 PG (ref 26–34)
MCHC RBC AUTO-ENTMCNC: 31.8 G/DL (ref 31–37)
MCV RBC AUTO: 98.7 FL
MONOCYTES # BLD AUTO: 0.39 X10(3) UL (ref 0.1–1)
MONOCYTES NFR BLD AUTO: 6.5 %
NEUTROPHILS # BLD AUTO: 3.51 X10 (3) UL (ref 1.5–7.7)
NEUTROPHILS # BLD AUTO: 3.51 X10(3) UL (ref 1.5–7.7)
NEUTROPHILS NFR BLD AUTO: 58.6 %
NITRITE UR QL STRIP.AUTO: POSITIVE
NONHDLC SERPL-MCNC: 130 MG/DL (ref ?–130)
OSMOLALITY SERPL CALC.SUM OF ELEC: 289 MOSM/KG (ref 275–295)
PH UR: 6 [PH] (ref 5–8)
PLATELET # BLD AUTO: 201 10(3)UL (ref 150–450)
POTASSIUM SERPL-SCNC: 4.2 MMOL/L (ref 3.5–5.1)
PROT SERPL-MCNC: 6.9 G/DL (ref 6.4–8.2)
PROT UR-MCNC: NEGATIVE MG/DL
RBC # BLD AUTO: 4.5 X10(6)UL
SODIUM SERPL-SCNC: 138 MMOL/L (ref 136–145)
SP GR UR STRIP: 1.02 (ref 1–1.03)
TRIGL SERPL-MCNC: 77 MG/DL (ref 30–149)
TSI SER-ACNC: 1.46 MIU/ML (ref 0.36–3.74)
UROBILINOGEN UR STRIP-ACNC: 0.2
VIT B12 SERPL-MCNC: 414 PG/ML (ref 193–986)
VIT D+METAB SERPL-MCNC: 20.7 NG/ML (ref 30–100)
VLDLC SERPL CALC-MCNC: 13 MG/DL (ref 0–30)
WBC # BLD AUTO: 6 X10(3) UL (ref 4–11)

## 2022-08-22 PROCEDURE — 80061 LIPID PANEL: CPT

## 2022-08-22 PROCEDURE — 84443 ASSAY THYROID STIM HORMONE: CPT

## 2022-08-22 PROCEDURE — 80053 COMPREHEN METABOLIC PANEL: CPT

## 2022-08-22 PROCEDURE — 87186 SC STD MICRODIL/AGAR DIL: CPT

## 2022-08-22 PROCEDURE — 85025 COMPLETE CBC W/AUTO DIFF WBC: CPT

## 2022-08-22 PROCEDURE — 82607 VITAMIN B-12: CPT

## 2022-08-22 PROCEDURE — 81015 MICROSCOPIC EXAM OF URINE: CPT

## 2022-08-22 PROCEDURE — 87077 CULTURE AEROBIC IDENTIFY: CPT

## 2022-08-22 PROCEDURE — 86480 TB TEST CELL IMMUN MEASURE: CPT

## 2022-08-22 PROCEDURE — 87086 URINE CULTURE/COLONY COUNT: CPT

## 2022-08-22 PROCEDURE — 82306 VITAMIN D 25 HYDROXY: CPT

## 2022-08-22 PROCEDURE — 36415 COLL VENOUS BLD VENIPUNCTURE: CPT

## 2022-08-23 ENCOUNTER — PATIENT MESSAGE (OUTPATIENT)
Dept: INTERNAL MEDICINE CLINIC | Facility: CLINIC | Age: 82
End: 2022-08-23

## 2022-08-23 ENCOUNTER — LAB ENCOUNTER (OUTPATIENT)
Dept: LAB | Facility: HOSPITAL | Age: 82
End: 2022-08-23
Attending: PODIATRIST
Payer: MEDICARE

## 2022-08-23 DIAGNOSIS — M79.672 FOOT PAIN, LEFT: ICD-10-CM

## 2022-08-23 DIAGNOSIS — E55.9 VITAMIN D DEFICIENCY: Primary | ICD-10-CM

## 2022-08-23 DIAGNOSIS — M19.072 ARTHROSIS OF MIDFOOT, LEFT: ICD-10-CM

## 2022-08-23 DIAGNOSIS — M19.079 ARTHRITIS OF MIDFOOT: ICD-10-CM

## 2022-08-23 DIAGNOSIS — E55.9 VITAMIN D DEFICIENCY: ICD-10-CM

## 2022-08-23 LAB — VIT D+METAB SERPL-MCNC: 15.5 NG/ML (ref 30–100)

## 2022-08-23 PROCEDURE — 82306 VITAMIN D 25 HYDROXY: CPT

## 2022-08-23 PROCEDURE — 36415 COLL VENOUS BLD VENIPUNCTURE: CPT

## 2022-08-23 RX ORDER — ERGOCALCIFEROL 1.25 MG/1
50000 CAPSULE ORAL WEEKLY
Qty: 12 CAPSULE | Refills: 0 | Status: SHIPPED | OUTPATIENT
Start: 2022-08-23 | End: 2022-09-22

## 2022-08-23 NOTE — TELEPHONE ENCOUNTER
Jacinta Conti RN 8/22/2022 11:38 AM CDT      ----- Message -----  From: Violet Richardson  Sent: 8/22/2022 11:34 AM CDT  To: [facility] Clinical Staff  Subject: Blood work     We are at the lab now to get the blood work and it should be available for you later today. Please let Ivette Mcrae know when the [employer]) paper work is ready and she can . We are headed to [facility] on Wednesday afternoon so it would be great to have by then if possible. Her email is in my profile [email or web address].  Many thanks!!

## 2022-08-23 NOTE — TELEPHONE ENCOUNTER
Routing to [facility]. Did we receive anything from [place] regarding immunization records/updates?

## 2022-08-24 ENCOUNTER — TELEPHONE (OUTPATIENT)
Dept: INTERNAL MEDICINE CLINIC | Facility: CLINIC | Age: 82
End: 2022-08-24

## 2022-08-24 ENCOUNTER — PATIENT MESSAGE (OUTPATIENT)
Dept: INTERNAL MEDICINE CLINIC | Facility: CLINIC | Age: 82
End: 2022-08-24

## 2022-08-24 DIAGNOSIS — E55.9 VITAMIN D DEFICIENCY: Primary | ICD-10-CM

## 2022-08-24 LAB
M TB IFN-G CD4+ T-CELLS BLD-ACNC: 0.04 IU/ML
M TB TUBERC IFN-G BLD QL: NEGATIVE
M TB TUBERC IGNF/MITOGEN IGNF CONTROL: >10 IU/ML
QFT TB1 AG MINUS NIL: -0.01 IU/ML
QFT TB2 AG MINUS NIL: 0.01 IU/ML
SARS-COV-2 RNA RESP QL NAA+PROBE: NOT DETECTED

## 2022-08-24 RX ORDER — CEPHALEXIN 500 MG/1
500 CAPSULE ORAL 2 TIMES DAILY
Qty: 14 CAPSULE | Refills: 0 | Status: SHIPPED | OUTPATIENT
Start: 2022-08-24

## 2022-08-24 NOTE — TELEPHONE ENCOUNTER
From: Compa Bull  Sent: 8/24/2022 10:41 AM CDT  To: [facility] Clinical Staff  Subject: TDAP shot    Terrific. Thanks for your resourcefulness! I think this is the final piece needed for Wendy Henriquez to complete moms paperwork for her admission to [facility]. Once ready - can you send the completed forms (scanned to My chart and mailed?). I could also drop by to . Just let me know when ready. Thanks again and have a great day!

## 2022-08-24 NOTE — TELEPHONE ENCOUNTER
From: Afshan Pérez  To: ANASTACIO Goins  Sent: 8/24/2022 10:06 AM CDT  Subject: TDAP shot    Any luck getting the info on moms shot? If not - I will call the pharmacy. Thank you!

## 2022-08-24 NOTE — TELEPHONE ENCOUNTER
Called patient and her Daughter Lakshmi Reyes will come to  form for pt new living facility. Advised daughter to bring ID. Placed in front office drawer.

## 2022-08-25 ENCOUNTER — TELEPHONE (OUTPATIENT)
Dept: PODIATRY CLINIC | Facility: CLINIC | Age: 82
End: 2022-08-25

## 2022-08-25 RX ORDER — SERTRALINE HYDROCHLORIDE 25 MG/1
TABLET, FILM COATED ORAL
Qty: 90 TABLET | Refills: 0 | OUTPATIENT
Start: 2022-08-25

## 2022-08-25 NOTE — TELEPHONE ENCOUNTER
I spoke to the patient's daughter Donald Lopez about the change in procedure we cannot get the Ortho Sfeir implants anymore they are not fabricated and as result of that we are switching to a dermal graft. I discussed the changes the nature and extent of the procedure the reasons for it I think that right now is the best way to proceed.

## 2022-08-25 NOTE — TELEPHONE ENCOUNTER
Patient's daughter Lakshmi Reyes left me a voicemail stating that some routine blood work her mom had turned up an assymptomatic UTI and her doctor prescribed Cephalexin 500 mg. BID but they have not started it yet because she wanted to be sure that is okay to do before surgery. Spoke with Dr. Dayan Dee and left Lakshmi Reyes a message that they should start the antibiotics today and take as directed. He is okay to proceed with her foot surgery tomorrow.   She should call me back directly with any questions at [phone]

## 2022-08-26 ENCOUNTER — LAB REQUISITION (OUTPATIENT)
Dept: SURGERY | Age: 82
End: 2022-08-26
Payer: MEDICARE

## 2022-08-26 ENCOUNTER — TELEPHONE (OUTPATIENT)
Dept: PODIATRY CLINIC | Facility: CLINIC | Age: 82
End: 2022-08-26

## 2022-08-26 ENCOUNTER — PROCEDURE (OUTPATIENT)
Dept: SURGERY | Age: 82
End: 2022-08-26

## 2022-08-26 DIAGNOSIS — M19.072 ARTHRITIS OF LEFT FOOT: ICD-10-CM

## 2022-08-26 DIAGNOSIS — Z98.890 STATUS POST FOOT SURGERY: Primary | ICD-10-CM

## 2022-08-26 DIAGNOSIS — M79.672 LEFT FOOT PAIN: ICD-10-CM

## 2022-08-26 PROCEDURE — 88305 TISSUE EXAM BY PATHOLOGIST: CPT | Performed by: PODIATRIST

## 2022-08-26 PROCEDURE — 88311 DECALCIFY TISSUE: CPT | Performed by: PODIATRIST

## 2022-08-26 RX ORDER — AMOXICILLIN AND CLAVULANATE POTASSIUM 875; 125 MG/1; MG/1
1 TABLET, FILM COATED ORAL 2 TIMES DAILY
Qty: 14 TABLET | Refills: 0 | Status: SHIPPED | OUTPATIENT
Start: 2022-08-26

## 2022-08-26 RX ORDER — HYDROCODONE BITARTRATE AND ACETAMINOPHEN 5; 325 MG/1; MG/1
1 TABLET ORAL EVERY 6 HOURS PRN
Qty: 30 TABLET | Refills: 0 | Status: SHIPPED | OUTPATIENT
Start: 2022-08-26

## 2022-08-26 NOTE — TELEPHONE ENCOUNTER
Brayden Barry, KRISTINE  You 3 minutes ago (3:10 PM)         Tell her to take the cephalosporin    Message text

## 2022-08-26 NOTE — TELEPHONE ENCOUNTER
Per daughter pt is already taking cefoloxacin and was prescribed amoxclav today and asking if she is supposed to take both. Per pt if n/a to leave a message.  Please advise

## 2022-08-26 NOTE — TELEPHONE ENCOUNTER
Per daughter pt had surgery this morning and states the entire bottom of her bandage is covered in blood.  Connected to rn

## 2022-08-26 NOTE — PROGRESS NOTES
[facility]     OPERATIVE REPORT    Melody Wilson    [facility] [id number] MRN [id number]    1940 Age 78year old   Admission Date (Not on file) Operation Date 2022   Attending Physician No att. 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 20 cc via left ankle block     HEMOSTASIS:   Pneumatic ankle tourniquet inflated to 250 mmHg following exsanguination with a Avelino's bandage left lower extremity electrocautery and direct pressure     ESTIMATED BLOOD LOSS:   10 cc     INDICATIONS:  This 78year old female presented with this patient has been suffering from lateral foot pain for years she had received multiple cortisone injections with minimal improvement and needs cortisone injection lasted less than last time. FINDINGS:   There are diffuse osteoarthritic changes noted to the fourth and fifth metatarsal cuboid joint with osteophytic lipping dorsally. SPECIMENS:   Fourth and fifth metatarsal base articular cartilage to pathology for arthritis left foot     COMPLICATIONS:  None. DRAINS:   None     OPERATIVE TECHNIQUE:      The patient was brought into the operating room with vital signs stable placed in supine position on the operating table. With all personnel present graft materials in the room the rep was also present timeout was taken there were no additions deletions or concerns reported patient received 2 g of Ancef. Patient was placed under intravenous sedation and the ankle block was carried out as above. The left foot was then prepped and draped using usual aseptic technique and hemostasis was achieved as above.   The interspace of the fourth and fifth metatarsals was palpated a erika was made at the proximal intersection of the hallux bones and then carried proximally over the cuboid that measured 6 cm. It was centered over the fourth and fifth metatarsal cuboid joints. The incision was deepened using both sharp and blunt technique superficial veins were ligated cauterized or retracted as necessary there is no neurological tissue seen in the wound blunt dissection was carried out to separate the deep layers retractors were put in place and the capsule and periosteal incision was made a full length of the skin incision the capsule and periosteal structures were then denuded from the fourth and fifth metatarsal cuboid joint medially and laterally and retracted. The joints were distracted a sagittal saw was used to resect the arthritic bases of the fourth and fifth metatarsal bones as well as the adjacent surfaces of the cuboid. The area was checked for any remaining fragments which were removed the bone edges were curetted and rasped to make sure that all articular cartilage was gone. The area was then flushed with copious amounts of saline. Fluoroscopy was used to visualize resection was found to be good. A 4 x 4 centimeter graft was then placed inside the resected area of the fourth and fifth metatarsal cuboid joint area. It was sutured in place. Fluoroscopy was again used to see correction was found to be good and the capsule and periosteal structures were then reapproximated and maintained using 3-0 Vicryl suture. The subcutaneous tissues were reapproximated maintained using 3-0 Vicryl suture. The pneumatic ankle tourniquet was released so we could further address hemostasis and once achieved the skin edges were reapproximated maintained using 3-0 nylon suture in a simple interrupted fashion. Postoperative site was then injected with 1 cc of dexamethasone phosphate.   Sterile postoperative dressing was applied consisting of Adaptic sterile gauze 3 inch Jace followed by an Ace wrap. The patient tolerated the above anesthesia procedure well left the operating with vital signs stable neurovascular status of the left foot intact to recovery room via cart.     Perez Fu DPM

## 2022-08-26 NOTE — TELEPHONE ENCOUNTER
S/w pt daughter and she states caregiver took ace bandage off, all gauze in place. I advised her to rewrap and put ace back on. Dr Hannah Lyons updated and agrees with plan.

## 2022-08-26 NOTE — TELEPHONE ENCOUNTER
S/w pt daughter and she states pt is having bleeding through the bandages, fresh red blood covering about 3/4 of the bottom of the foot and also on the lateral side. Pt has been resting and elevating. I did advised her that some bleeding through the bandages is normal and pt should continue to rest and elevate. Pt does have gauze wrap and I encouraged to re-enforce the dressing with gauze wrap and to continue to monitor bleeding and if it continues or worsens to CB and have Dr Maximo Brennan paged. She will also send a picture through [facility].

## 2022-08-27 ENCOUNTER — HOSPITAL ENCOUNTER (EMERGENCY)
Facility: HOSPITAL | Age: 82
Discharge: HOME | End: 2022-08-27
Attending: EMERGENCY MEDICINE
Payer: MEDICARE

## 2022-08-27 VITALS
DIASTOLIC BLOOD PRESSURE: 66 MMHG | OXYGEN SATURATION: 100 % | HEART RATE: 68 BPM | SYSTOLIC BLOOD PRESSURE: 140 MMHG | WEIGHT: 147.25 LBS | BODY MASS INDEX: 25 KG/M2 | TEMPERATURE: 98 F | RESPIRATION RATE: 20 BRPM

## 2022-08-27 DIAGNOSIS — T14.8XXA BLEEDING FROM WOUND: Primary | ICD-10-CM

## 2022-08-27 PROCEDURE — 99283 EMERGENCY DEPT VISIT LOW MDM: CPT

## 2022-08-27 RX ORDER — HYDROCODONE BITARTRATE AND ACETAMINOPHEN 5; 325 MG/1; MG/1
1 TABLET ORAL ONCE
Status: COMPLETED | OUTPATIENT
Start: 2022-08-27 | End: 2022-08-27

## 2022-08-27 NOTE — ED QUICK NOTES
Wound wrapped as instructed by MD. nonstick pad over approximated surgical incision with staples intact but wound actively oozing blood. fluffy gauze 4X4s applied over the nonstick dressing then foot wrapped with a bandage roll. After the bandage roll then wrapped with coban. Next the foot was wrapped with Kerlex and ended with an ACE wrap as the last layer. Cap refill < 2 seconds and sensation intact.

## 2022-09-01 ENCOUNTER — OFFICE VISIT (OUTPATIENT)
Dept: PODIATRY CLINIC | Facility: CLINIC | Age: 82
End: 2022-09-01
Payer: MEDICARE

## 2022-09-01 DIAGNOSIS — Z98.890 STATUS POST FOOT SURGERY: Primary | ICD-10-CM

## 2022-09-01 PROCEDURE — 99024 POSTOP FOLLOW-UP VISIT: CPT | Performed by: PODIATRIST

## 2022-09-01 PROCEDURE — L4387 NON-PNEUM WALK BOOT PRE OTS: HCPCS | Performed by: PODIATRIST

## 2022-09-07 ENCOUNTER — TELEPHONE (OUTPATIENT)
Dept: INTERNAL MEDICINE CLINIC | Facility: CLINIC | Age: 82
End: 2022-09-07

## 2022-09-07 RX ORDER — LOSARTAN POTASSIUM 25 MG/1
25 TABLET ORAL DAILY
Qty: 90 TABLET | Refills: 3 | Status: SHIPPED | OUTPATIENT
Start: 2022-09-07

## 2022-09-08 ENCOUNTER — OFFICE VISIT (OUTPATIENT)
Dept: PODIATRY CLINIC | Facility: CLINIC | Age: 82
End: 2022-09-08
Payer: MEDICARE

## 2022-09-08 DIAGNOSIS — Z98.890 STATUS POST FOOT SURGERY: Primary | ICD-10-CM

## 2022-09-08 PROCEDURE — 99024 POSTOP FOLLOW-UP VISIT: CPT | Performed by: PODIATRIST

## 2022-09-08 PROCEDURE — L3260 AMBULATORY SURGICAL BOOT EAC: HCPCS | Performed by: PODIATRIST

## 2022-09-15 ENCOUNTER — PATIENT MESSAGE (OUTPATIENT)
Dept: INTERNAL MEDICINE CLINIC | Facility: CLINIC | Age: 82
End: 2022-09-15

## 2022-09-15 ENCOUNTER — OFFICE VISIT (OUTPATIENT)
Dept: PODIATRY CLINIC | Facility: CLINIC | Age: 82
End: 2022-09-15
Payer: MEDICARE

## 2022-09-15 DIAGNOSIS — Z98.890 STATUS POST FOOT SURGERY: Primary | ICD-10-CM

## 2022-09-15 PROCEDURE — 99024 POSTOP FOLLOW-UP VISIT: CPT | Performed by: PODIATRIST

## 2022-09-16 ENCOUNTER — NURSE ONLY (OUTPATIENT)
Facility: LOCATION | Age: 82
End: 2022-09-16
Payer: MEDICARE

## 2022-09-16 ENCOUNTER — OFFICE VISIT (OUTPATIENT)
Facility: LOCATION | Age: 82
End: 2022-09-16
Payer: MEDICARE

## 2022-09-16 DIAGNOSIS — H90.3 SENSORINEURAL HEARING LOSS (SNHL) OF BOTH EARS: ICD-10-CM

## 2022-09-16 DIAGNOSIS — H90.3 SENSORINEURAL HEARING LOSS (SNHL) OF BOTH EARS: Primary | ICD-10-CM

## 2022-09-16 DIAGNOSIS — H61.23 CERUMEN DEBRIS ON TYMPANIC MEMBRANE OF BOTH EARS: Primary | ICD-10-CM

## 2022-09-16 PROCEDURE — 92567 TYMPANOMETRY: CPT | Performed by: AUDIOLOGIST-HEARING AID FITTER

## 2022-09-16 PROCEDURE — 92557 COMPREHENSIVE HEARING TEST: CPT | Performed by: AUDIOLOGIST-HEARING AID FITTER

## 2022-09-16 PROCEDURE — 99203 OFFICE O/P NEW LOW 30 MIN: CPT | Performed by: OTOLARYNGOLOGY

## 2022-09-16 NOTE — PROGRESS NOTES
Parvin Man was seen for an audiometric evaluation today. Referred back to physician.     Ajith Brandon

## 2022-09-16 NOTE — TELEPHONE ENCOUNTER
Last OV: 8/19/22 annual PE    Future Appointments   Date Time Provider Audie Dooley   10/5/2022  4:00 PM Marni Burger [id number]        Latest labs: 8/22/22 Vit B12, D, TSH, Lipid, CMP and CBC    Latest RX: SERTRALINE 50MG TABLETS 90 tabs 1 refills on 3/22/22    Per protocol, not on protocol. Rx pending.

## 2022-09-19 NOTE — TELEPHONE ENCOUNTER
Rx denied due to refill sent already    Latest RX: sertraline 50 MG Oral Tab 90 tabs 1 refill on 9/16/22    Per protocol, not on protocol. Rx denied.

## 2022-10-05 ENCOUNTER — OFFICE VISIT (OUTPATIENT)
Dept: PODIATRY CLINIC | Facility: CLINIC | Age: 82
End: 2022-10-05
Payer: MEDICARE

## 2022-10-05 DIAGNOSIS — Z98.890 STATUS POST FOOT SURGERY: Primary | ICD-10-CM

## 2022-10-05 PROCEDURE — 99024 POSTOP FOLLOW-UP VISIT: CPT | Performed by: PODIATRIST

## 2022-10-07 ENCOUNTER — TELEPHONE (OUTPATIENT)
Dept: PODIATRY CLINIC | Facility: CLINIC | Age: 82
End: 2022-10-07

## 2022-10-07 RX ORDER — CELECOXIB 200 MG/1
200 CAPSULE ORAL DAILY
Qty: 30 CAPSULE | Refills: 2 | Status: SHIPPED | OUTPATIENT
Start: 2022-10-07

## 2022-10-07 NOTE — TELEPHONE ENCOUNTER
10/5/22 note not in. No rx sent. Pt has outpt PT ordered, but requesting Residential [facility], please advise.

## 2022-10-07 NOTE — TELEPHONE ENCOUNTER
Per Farhat Hoffmann needs a verbal ok to move physical therapy start to Monday 10/10.  Please advise

## 2022-10-07 NOTE — TELEPHONE ENCOUNTER
I gave a generic PT order which they should be able to use for residential if not copy the PT order to residential

## 2022-10-07 NOTE — TELEPHONE ENCOUNTER
S/w pt daughter(on HIPPA) and informed her per Dr Vikki Reza message and informed her I already s/w residential and they should be calling to set up. She had no further concerns.

## 2022-10-07 NOTE — TELEPHONE ENCOUNTER
Kaci Barber at [facility] notified. 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 Consult for Type 1 DM with hypoglycemia, now with inpatient hyperglycemia.    1. Type 1 DM  -A1C 6.3.   -Hypoglycemia likely due to fluctuations in carb intake between meals.  At home patient calculates each dose based on carb ratio and correction factor.  Therefore whatever dose titrations are happening here will not affect his insulin dosing at home.  Patient feels well and can be dc home from endocrine standpoint.    If remains inpatient:  -Would c/w lantus 23 units qAM and change Humalog to 9/8/6  C/w low dose humalog correctional scale.    DC plan:  He will resume his prior home regimen with alternating Lantus doses for HD and non-HD days and using ratios for dose calculation as described in endocrine consult.  -Patient has endocrinologist by [facility] that wishes to follow with. He doesn't remember the name.

## 2022-10-07 NOTE — TELEPHONE ENCOUNTER
Per daughter pt was supposed to get a prescription sent to [facility] (did not have rx name) and is requesting to do physical therapy through residential. Please advise

## 2022-10-10 ENCOUNTER — TELEPHONE (OUTPATIENT)
Dept: PODIATRY CLINIC | Facility: CLINIC | Age: 82
End: 2022-10-10

## 2022-10-10 NOTE — TELEPHONE ENCOUNTER
Per Angel Potts pt had home health PT evaluation today, pt will be seen 2x this week, and once a week for 5 weeks, wanted Dr. José Antonio Manzo to be aware.

## 2022-10-12 ENCOUNTER — OFFICE VISIT (OUTPATIENT)
Dept: INTERNAL MEDICINE CLINIC | Facility: CLINIC | Age: 82
End: 2022-10-12
Payer: MEDICARE

## 2022-10-12 VITALS
HEART RATE: 80 BPM | HEIGHT: 64 IN | TEMPERATURE: 98 F | WEIGHT: 147 LBS | BODY MASS INDEX: 25.1 KG/M2 | DIASTOLIC BLOOD PRESSURE: 60 MMHG | OXYGEN SATURATION: 96 % | SYSTOLIC BLOOD PRESSURE: 118 MMHG

## 2022-10-12 DIAGNOSIS — F41.9 ANXIETY: ICD-10-CM

## 2022-10-12 DIAGNOSIS — Z71.89 ADVANCED DIRECTIVES, COUNSELING/DISCUSSION: Primary | ICD-10-CM

## 2022-10-12 DIAGNOSIS — Z12.31 ENCOUNTER FOR SCREENING MAMMOGRAM FOR MALIGNANT NEOPLASM OF BREAST: ICD-10-CM

## 2022-10-12 PROCEDURE — 99214 OFFICE O/P EST MOD 30 MIN: CPT | Performed by: NURSE PRACTITIONER

## 2022-10-12 NOTE — PROGRESS NOTES
MEDICARE PT - AWV Completed 8/19/2022  Visual Aquity - Completed 8/19/2022  COLONOSCOPY - UTD Until 11/16/2023

## 2022-10-19 ENCOUNTER — OFFICE VISIT (OUTPATIENT)
Dept: PODIATRY CLINIC | Facility: CLINIC | Age: 82
End: 2022-10-19
Payer: MEDICARE

## 2022-10-19 DIAGNOSIS — Z98.890 STATUS POST FOOT SURGERY: Primary | ICD-10-CM

## 2022-10-19 PROCEDURE — 99024 POSTOP FOLLOW-UP VISIT: CPT | Performed by: PODIATRIST

## 2022-12-12 ENCOUNTER — OFFICE VISIT (OUTPATIENT)
Dept: PODIATRY CLINIC | Facility: CLINIC | Age: 82
End: 2022-12-12
Payer: MEDICARE

## 2022-12-12 VITALS — DIASTOLIC BLOOD PRESSURE: 60 MMHG | HEART RATE: 65 BPM | SYSTOLIC BLOOD PRESSURE: 130 MMHG

## 2022-12-12 DIAGNOSIS — M19.072 ARTHROSIS OF MIDFOOT, LEFT: Primary | ICD-10-CM

## 2022-12-12 DIAGNOSIS — M19.079 ARTHRITIS OF MIDFOOT: ICD-10-CM

## 2022-12-12 DIAGNOSIS — M79.672 FOOT PAIN, LEFT: ICD-10-CM

## 2022-12-12 PROCEDURE — 73630 X-RAY EXAM OF FOOT: CPT | Performed by: PODIATRIST

## 2022-12-12 PROCEDURE — 20600 DRAIN/INJ JOINT/BURSA W/O US: CPT | Performed by: PODIATRIST

## 2022-12-12 RX ORDER — TRIAMCINOLONE ACETONIDE 40 MG/ML
20 INJECTION, SUSPENSION INTRA-ARTICULAR; INTRAMUSCULAR ONCE
Status: COMPLETED | OUTPATIENT
Start: 2022-12-12 | End: 2022-12-12

## 2022-12-12 RX ADMIN — TRIAMCINOLONE ACETONIDE 20 MG: 40 INJECTION, SUSPENSION INTRA-ARTICULAR; INTRAMUSCULAR at 15:46:00

## 2022-12-12 NOTE — PROGRESS NOTES
Per verbal order from Dr. Teresita James 0.5mL of kenalog, & 0.5mL of 0.5% marcaine was drawn up and injected into the patient's left foot by the provider. Vitals and consent form completed prior to injection.      Monique Cordova RN

## 2022-12-17 RX ORDER — CELECOXIB 200 MG/1
200 CAPSULE ORAL DAILY
Qty: 30 CAPSULE | Refills: 2 | Status: SHIPPED | OUTPATIENT
Start: 2022-12-17

## 2022-12-27 ENCOUNTER — OFFICE VISIT (OUTPATIENT)
Dept: PODIATRY CLINIC | Facility: CLINIC | Age: 82
End: 2022-12-27
Payer: MEDICARE

## 2022-12-27 VITALS — SYSTOLIC BLOOD PRESSURE: 98 MMHG | HEART RATE: 62 BPM | DIASTOLIC BLOOD PRESSURE: 62 MMHG

## 2022-12-27 DIAGNOSIS — M19.072 ARTHROSIS OF MIDFOOT, LEFT: Primary | ICD-10-CM

## 2022-12-27 RX ORDER — TRIAMCINOLONE ACETONIDE 40 MG/ML
20 INJECTION, SUSPENSION INTRA-ARTICULAR; INTRAMUSCULAR ONCE
Status: COMPLETED | OUTPATIENT
Start: 2022-12-27 | End: 2022-12-27

## 2022-12-27 RX ADMIN — TRIAMCINOLONE ACETONIDE 20 MG: 40 INJECTION, SUSPENSION INTRA-ARTICULAR; INTRAMUSCULAR at 12:05:00

## 2022-12-27 NOTE — PROGRESS NOTES
Per verbal order from Dr. Korin Red 0.5mL of kenalog, & 0.5mL of 0.5% marcaine was drawn up and injected into the patient's left foot by the provider. Blood pressure and heart rate obtained prior to procedure.

## 2023-01-03 ENCOUNTER — OFFICE VISIT (OUTPATIENT)
Dept: PODIATRY CLINIC | Facility: CLINIC | Age: 83
End: 2023-01-03
Payer: MEDICARE

## 2023-01-03 DIAGNOSIS — Z98.890 STATUS POST FOOT SURGERY: Primary | ICD-10-CM

## 2023-01-03 PROCEDURE — 99212 OFFICE O/P EST SF 10 MIN: CPT | Performed by: PODIATRIST

## 2023-01-11 ENCOUNTER — TELEPHONE (OUTPATIENT)
Dept: PODIATRY CLINIC | Facility: CLINIC | Age: 83
End: 2023-01-11

## 2023-01-16 ENCOUNTER — TELEPHONE (OUTPATIENT)
Dept: PODIATRY CLINIC | Facility: CLINIC | Age: 83
End: 2023-01-16

## 2023-01-16 NOTE — TELEPHONE ENCOUNTER
Physical therapist calling to inform that the pt will start PT as of today 1/16/23, 1 time weekly for 7 weeks.

## 2023-02-01 ENCOUNTER — TELEPHONE (OUTPATIENT)
Dept: PODIATRY CLINIC | Facility: CLINIC | Age: 83
End: 2023-02-01

## 2023-02-01 NOTE — TELEPHONE ENCOUNTER
CHESTER Potts she saw pt 1/31 and pt reported to have had an episode of a fall on 1/19 or 1/20 (pt could not remember which date). Joey Potts pt stated she woke up in the morning on the floor in her room and was able to get up on her own without discomfort and has been able to move around without complaints.  Thank you

## 2023-02-02 ENCOUNTER — OFFICE VISIT (OUTPATIENT)
Dept: PODIATRY CLINIC | Facility: CLINIC | Age: 83
End: 2023-02-02

## 2023-02-02 DIAGNOSIS — M79.672 FOOT PAIN, LEFT: ICD-10-CM

## 2023-02-02 DIAGNOSIS — Z98.890 STATUS POST FOOT SURGERY: Primary | ICD-10-CM

## 2023-02-02 DIAGNOSIS — M19.072 ARTHROSIS OF MIDFOOT, LEFT: ICD-10-CM

## 2023-02-02 PROCEDURE — 99212 OFFICE O/P EST SF 10 MIN: CPT | Performed by: PODIATRIST

## 2023-02-06 ENCOUNTER — TELEPHONE (OUTPATIENT)
Dept: INTERNAL MEDICINE CLINIC | Facility: CLINIC | Age: 83
End: 2023-02-06

## 2023-02-06 NOTE — TELEPHONE ENCOUNTER
Per SD 40 minutes needed. Please offer SDA or next 40 min appointment available. 20 minute is not long enough to address.

## 2023-02-06 NOTE — TELEPHONE ENCOUNTER
Pt 's daughter Sylvester Forth - on hipaa, stated her mom's sxs aren't new, they have been going on for 60 years ago. Pt wants to know if she can keep same time.   Please advise if I can extend the appt time to 40min    Pt is scheduled on below    Future Appointments   Date Time Provider Audie Dooley   2/9/2023 10:40 AM ANASTACIO Tucker [facility]

## 2023-02-06 NOTE — TELEPHONE ENCOUNTER
sent msg to SD to see if more time is needed lee Urinary Incontinence--Per SD she will need 40 min. There are no appointments for this soon. Should she be seen sooner and when?

## 2023-02-09 ENCOUNTER — OFFICE VISIT (OUTPATIENT)
Dept: INTERNAL MEDICINE CLINIC | Facility: CLINIC | Age: 83
End: 2023-02-09
Payer: MEDICARE

## 2023-02-09 VITALS
DIASTOLIC BLOOD PRESSURE: 64 MMHG | OXYGEN SATURATION: 97 % | WEIGHT: 141 LBS | BODY MASS INDEX: 24.07 KG/M2 | SYSTOLIC BLOOD PRESSURE: 122 MMHG | HEIGHT: 64 IN | TEMPERATURE: 98 F | HEART RATE: 76 BPM

## 2023-02-09 DIAGNOSIS — R32 URINARY INCONTINENCE, UNSPECIFIED TYPE: Primary | ICD-10-CM

## 2023-02-09 DIAGNOSIS — F32.A ANXIETY AND DEPRESSION: ICD-10-CM

## 2023-02-09 DIAGNOSIS — F41.9 ANXIETY AND DEPRESSION: ICD-10-CM

## 2023-02-09 LAB
APPEARANCE: CLEAR
BILIRUBIN: NEGATIVE
GLUCOSE (URINE DIPSTICK): NEGATIVE MG/DL
KETONES (URINE DIPSTICK): NEGATIVE MG/DL
MULTISTIX LOT#: ABNORMAL NUMERIC
NITRITE, URINE: NEGATIVE
PH, URINE: 7 (ref 4.5–8)
PROTEIN (URINE DIPSTICK): NEGATIVE MG/DL
SPECIFIC GRAVITY: 1.01 (ref 1–1.03)
UROBILINOGEN,SEMI-QN: 0.2 MG/DL (ref 0–1.9)

## 2023-02-09 PROCEDURE — 99214 OFFICE O/P EST MOD 30 MIN: CPT | Performed by: NURSE PRACTITIONER

## 2023-02-09 PROCEDURE — 81003 URINALYSIS AUTO W/O SCOPE: CPT | Performed by: NURSE PRACTITIONER

## 2023-02-09 RX ORDER — CEPHALEXIN 500 MG/1
500 CAPSULE ORAL 2 TIMES DAILY
Qty: 14 CAPSULE | Refills: 0 | Status: SHIPPED | OUTPATIENT
Start: 2023-02-09

## 2023-02-09 RX ORDER — SOLIFENACIN SUCCINATE 5 MG/1
5 TABLET, FILM COATED ORAL DAILY
Qty: 90 TABLET | Refills: 3 | Status: SHIPPED | OUTPATIENT
Start: 2023-02-09

## 2023-02-13 ENCOUNTER — TELEPHONE (OUTPATIENT)
Dept: INTERNAL MEDICINE CLINIC | Facility: CLINIC | Age: 83
End: 2023-02-13

## 2023-02-13 ENCOUNTER — TELEPHONE (OUTPATIENT)
Dept: PODIATRY CLINIC | Facility: CLINIC | Age: 83
End: 2023-02-13

## 2023-02-13 NOTE — TELEPHONE ENCOUNTER
Requesting for RN to call [facility] to talk to Rn supervisor - [phone] to let Dr know about med change - alprazolam is now 1 tab once daily and Tylenol 600 is also one tablet once daily

## 2023-02-13 NOTE — TELEPHONE ENCOUNTER
SD, [facility] PT called to discuss pt's medications. Pt told [facility] PT that SD told her not to take Tylenol PM any longer. [facility] states [facility] needs to reconcile the medication list because they are having a site visit.   [facility] if SD would be able to addend her ov note indicating pt was told not to take Tylenol PM?

## 2023-02-14 ENCOUNTER — OFFICE VISIT (OUTPATIENT)
Dept: PODIATRY CLINIC | Facility: CLINIC | Age: 83
End: 2023-02-14

## 2023-02-14 DIAGNOSIS — M79.672 FOOT PAIN, LEFT: ICD-10-CM

## 2023-02-14 DIAGNOSIS — Z98.890 STATUS POST FOOT SURGERY: ICD-10-CM

## 2023-02-14 DIAGNOSIS — M19.072 ARTHROSIS OF MIDFOOT, LEFT: Primary | ICD-10-CM

## 2023-02-14 DIAGNOSIS — R26.81 GAIT INSTABILITY: ICD-10-CM

## 2023-02-14 PROCEDURE — 99214 OFFICE O/P EST MOD 30 MIN: CPT | Performed by: STUDENT IN AN ORGANIZED HEALTH CARE EDUCATION/TRAINING PROGRAM

## 2023-02-15 NOTE — TELEPHONE ENCOUNTER
Patient seen 2/14/23 with Dr. Pravin Quiroz. Spoke with Alber Mirza [facility] RN today 2/15/23 wanted to provide Dr. Dayan Dee and Dr. Pravin Quiroz an update. According to home health RN Solitario Rubin. Michelle CHAVES Tylenol PM-discontinued. Patient is taking Tylenol arthritis once a day for pain. [facility] states she believes she will be seeing patient tomorrow 2/16/23.

## 2023-02-18 NOTE — PATIENT INSTRUCTIONS
-Ambulate with supportive shoes and avoid walking barefoot.  -Can take anti-inflammatories/ice site as needed for inflammation.  -Complete physical therapy as prescribed for gait training/strengthening.

## 2023-02-22 ENCOUNTER — TELEPHONE (OUTPATIENT)
Dept: PODIATRY CLINIC | Facility: CLINIC | Age: 83
End: 2023-02-22

## 2023-02-22 NOTE — TELEPHONE ENCOUNTER
Pt will continue PT once a week for 2 weeks starting 3/5 -     They will re certify after that period on 3/19 once a week for 2 weeks     They also want to make Dr Giuseppe Jacome aware that they are going to continue with current PT and not the PT that he ordered on 2/14 with a higher frequency     Focus of therapy will include general strengthening and gait and balance and L foot pain

## 2023-02-28 ENCOUNTER — TELEPHONE (OUTPATIENT)
Dept: INTERNAL MEDICINE CLINIC | Facility: CLINIC | Age: 83
End: 2023-02-28

## 2023-02-28 ENCOUNTER — TELEPHONE (OUTPATIENT)
Dept: PODIATRY CLINIC | Facility: CLINIC | Age: 83
End: 2023-02-28

## 2023-02-28 NOTE — TELEPHONE ENCOUNTER
Tessa Adames from [facility] pt [facility] and pt reported she had congestion and mild cough and all her vital signs were within normal limits and was able to participate in physical therapy. Pt report today was the 3rd day with these symptoms. Tessa Adames advised pt to continue to monitor sxs and call the dr. Apple Meyer if she got worse, or UC if she couldn't get an appt.

## 2023-02-28 NOTE — TELEPHONE ENCOUNTER
Per Ruth Tijerina RN, calling to provide update on pt. Pt was seen this morning and vital signs were within normal limits. Stating pt did present with congestion, cough and hoarseness. Advised pt to be seen by PCP if her symptoms worse. No call back necessary, unless MD has questions.  Please advise

## 2023-03-14 ENCOUNTER — TELEPHONE (OUTPATIENT)
Dept: PODIATRY CLINIC | Facility: CLINIC | Age: 83
End: 2023-03-14

## 2023-03-14 ENCOUNTER — OFFICE VISIT (OUTPATIENT)
Dept: PODIATRY CLINIC | Facility: CLINIC | Age: 83
End: 2023-03-14

## 2023-03-14 VITALS — DIASTOLIC BLOOD PRESSURE: 68 MMHG | SYSTOLIC BLOOD PRESSURE: 124 MMHG

## 2023-03-14 DIAGNOSIS — M19.079 ARTHRITIS OF MIDFOOT: ICD-10-CM

## 2023-03-14 DIAGNOSIS — M79.672 FOOT PAIN, LEFT: ICD-10-CM

## 2023-03-14 DIAGNOSIS — R26.81 GAIT INSTABILITY: ICD-10-CM

## 2023-03-14 DIAGNOSIS — M19.072 ARTHROSIS OF MIDFOOT, LEFT: Primary | ICD-10-CM

## 2023-03-14 PROCEDURE — 20600 DRAIN/INJ JOINT/BURSA W/O US: CPT | Performed by: STUDENT IN AN ORGANIZED HEALTH CARE EDUCATION/TRAINING PROGRAM

## 2023-03-14 PROCEDURE — 99213 OFFICE O/P EST LOW 20 MIN: CPT | Performed by: STUDENT IN AN ORGANIZED HEALTH CARE EDUCATION/TRAINING PROGRAM

## 2023-03-14 RX ORDER — TRIAMCINOLONE ACETONIDE 40 MG/ML
20 INJECTION, SUSPENSION INTRA-ARTICULAR; INTRAMUSCULAR ONCE
Status: COMPLETED | OUTPATIENT
Start: 2023-03-14 | End: 2023-03-14

## 2023-03-14 NOTE — TELEPHONE ENCOUNTER
Per Cleveland Lujan, pt will have [facility] PT for 1x week for 2 weeks.  Please advise

## 2023-03-14 NOTE — PROGRESS NOTES
Per Dr. Collier Colon draw up 0.5ml of 0.5% Marcaine and 0.5ml of Kenalog 40 for injection to left foot.

## 2023-03-15 NOTE — TELEPHONE ENCOUNTER
That is fine she can have home health PT if she needs an order for please place an order thank you is for her left foot

## 2023-03-15 NOTE — PATIENT INSTRUCTIONS
-Monitor response to steroid injection. Ambulate with supportive shoes and avoid walking barefoot. Use compressive sleeve to left foot. Continue physical therapy. Follow-up as needed for reevaluation/possible repeat injection.

## 2023-03-23 ENCOUNTER — TELEPHONE (OUTPATIENT)
Dept: PODIATRY CLINIC | Facility: CLINIC | Age: 83
End: 2023-03-23

## 2023-03-23 NOTE — TELEPHONE ENCOUNTER
Dr. Sinai Martinez ,please see message below. DME order pended for you to review and sign off if appropriate. Thank you.

## 2023-03-23 NOTE — TELEPHONE ENCOUNTER
Patients daughter Jacob Bocanegra states patient did not received compression sock and would like to see If she can pick one up.  Please advise

## 2023-03-24 NOTE — TELEPHONE ENCOUNTER
I believe patient is referring to the nylon ankle compression stocking we have in our office--if they stop by during business hours they can pick one up. Can also be found on [employer]ax [employer] Thanks.

## 2023-03-27 ENCOUNTER — HOSPITAL ENCOUNTER (OUTPATIENT)
Dept: MAMMOGRAPHY | Age: 83
Discharge: HOME | End: 2023-03-27
Attending: NURSE PRACTITIONER
Payer: MEDICARE

## 2023-03-27 DIAGNOSIS — Z12.31 ENCOUNTER FOR SCREENING MAMMOGRAM FOR MALIGNANT NEOPLASM OF BREAST: ICD-10-CM

## 2023-03-27 PROCEDURE — 77067 SCR MAMMO BI INCL CAD: CPT | Performed by: NURSE PRACTITIONER

## 2023-03-27 PROCEDURE — 77063 BREAST TOMOSYNTHESIS BI: CPT | Performed by: NURSE PRACTITIONER

## 2023-04-26 NOTE — TELEPHONE ENCOUNTER
Patients daughter calling to follow up. Patient is in pain. would like to make sure there are refills and prior authorization.   Please advise

## 2023-04-27 RX ORDER — CELECOXIB 200 MG/1
200 CAPSULE ORAL DAILY
Qty: 30 CAPSULE | Refills: 2 | Status: SHIPPED | OUTPATIENT
Start: 2023-04-27

## 2023-05-26 ENCOUNTER — PATIENT MESSAGE (OUTPATIENT)
Dept: INTERNAL MEDICINE CLINIC | Facility: CLINIC | Age: 83
End: 2023-05-26

## 2023-05-30 RX ORDER — SOLIFENACIN SUCCINATE 10 MG/1
10 TABLET, FILM COATED ORAL DAILY
Qty: 90 TABLET | Refills: 1 | Status: SHIPPED | OUTPATIENT
Start: 2023-05-30

## 2023-05-31 RX ORDER — LOSARTAN POTASSIUM 25 MG/1
TABLET ORAL
Qty: 90 TABLET | Refills: 3 | OUTPATIENT
Start: 2023-05-31

## 2023-06-26 ENCOUNTER — TELEPHONE (OUTPATIENT)
Dept: PODIATRY CLINIC | Facility: CLINIC | Age: 83
End: 2023-06-26

## 2023-06-26 NOTE — TELEPHONE ENCOUNTER
Patients daughter states patient is in pain of left foot. States she might need a cortisone injection.  No available appointments until August. Please advise     PHONE: [phone]

## 2023-06-26 NOTE — TELEPHONE ENCOUNTER
S/w daughter of patient- Patient seen on 3/14/23 and received cortisone injection in left foot. Daughter states that helped a little and was wondering if she could be seen some time in a few weeks to discuss another cortisone shot or other options. States that she has completed PT and sx in the past and nothing really has helped with foot pain.     Please advise if okay to OB in a few weeks

## 2023-07-05 ENCOUNTER — TELEPHONE (OUTPATIENT)
Dept: INTERNAL MEDICINE CLINIC | Facility: CLINIC | Age: 83
End: 2023-07-05

## 2023-07-05 DIAGNOSIS — Z00.00 ROUTINE GENERAL MEDICAL EXAMINATION AT A HEALTH CARE FACILITY: Primary | ICD-10-CM

## 2023-07-05 DIAGNOSIS — E78.5 DYSLIPIDEMIA: ICD-10-CM

## 2023-07-05 NOTE — TELEPHONE ENCOUNTER
Future Appointments   Date Time Provider Audie Soumya   8/2/2023 11:20 AM Angeline Melendez, APRN [facility]     Orders to  [place]- Pt informed that labs need to be completed no sooner than 2 weeks prior to the appt.  Pt aware to fast-no call back required

## 2023-07-14 ENCOUNTER — OFFICE VISIT (OUTPATIENT)
Dept: PODIATRY CLINIC | Facility: CLINIC | Age: 83
End: 2023-07-14

## 2023-07-14 VITALS — SYSTOLIC BLOOD PRESSURE: 110 MMHG | DIASTOLIC BLOOD PRESSURE: 68 MMHG

## 2023-07-14 DIAGNOSIS — M79.672 FOOT PAIN, LEFT: ICD-10-CM

## 2023-07-14 DIAGNOSIS — R26.81 GAIT INSTABILITY: ICD-10-CM

## 2023-07-14 DIAGNOSIS — M19.072 ARTHROSIS OF MIDFOOT, LEFT: Primary | ICD-10-CM

## 2023-07-14 DIAGNOSIS — M19.079 ARTHRITIS OF MIDFOOT: ICD-10-CM

## 2023-07-14 PROCEDURE — 20605 DRAIN/INJ JOINT/BURSA W/O US: CPT | Performed by: STUDENT IN AN ORGANIZED HEALTH CARE EDUCATION/TRAINING PROGRAM

## 2023-07-14 RX ORDER — DEXAMETHASONE SODIUM PHOSPHATE 4 MG/ML
2 VIAL (ML) INJECTION ONCE
Status: COMPLETED | OUTPATIENT
Start: 2023-07-14 | End: 2023-07-14

## 2023-07-14 RX ADMIN — DEXAMETHASONE SODIUM PHOSPHATE 2 MG: 4 MG/ML VIAL (ML) INJECTION at 11:19:00

## 2023-07-14 NOTE — PROGRESS NOTES
Per verbal orders from Dr. Erin Streteer, draw up 1ml of 0.5% of Marcaine with 0.5ml of Kenalog 10 and 0.5ml of Dexamethasone sodium phosphate for injection to left foot.  Mireille Smith MA

## 2023-07-14 NOTE — PROGRESS NOTES
Melvin Moreno is a 78year old female. Patient presents with: Foot Pain: Pt here for Left Foot Pain. Pt is hoping to get injection in Left Foot. Pain Scale 4/10         HPI:     Patient is a pleasant 25-year-old female who presents to clinic with daughter for evaluation of continued left foot pain. Patient does have history of fourth and fifth TMT J arthroplasty with Dr. Miguel Angel Wu. She relates that last cortisone injection worked well for her but her pain is starting to return. She is rating the pain at a 4 out of 10. She is hoping for another injection at this time. She continues to ambulate with supportive shoes and inserts. No other complaints are mentioned. Allergies: Calcium Acetate   Current Outpatient Medications   Medication Sig Dispense Refill    Solifenacin Succinate 10 MG Oral Tab Take 1 tablet (10 mg total) by mouth daily. 90 tablet 1    sertraline 50 MG Oral Tab Take 1 tablet (50 mg total) by mouth daily. 90 tablet 2    celecoxib 200 MG Oral Cap Take 1 capsule (200 mg total) by mouth daily. 30 capsule 2    cephalexin 500 MG Oral Cap Take 1 capsule (500 mg total) by mouth 2 (two) times daily. 14 capsule 0    UREA 20 INTENSIVE HYDRATING 20 % External Cream MIX WITH TRIAMCINOLONE AND APPLY TOPICALLY TO THE AFFECTED AREA EVERY DAY AS DIRECTED.      losartan 25 MG Oral Tab Take 1 tablet (25 mg total) by mouth daily. 90 tablet 3    diclofenac 1 % External Gel Apply 2 g topically 2 (two) times daily as needed.  150 g 1    ibuprofen 200 MG Oral Tab Take 1 tablet (200 mg total) by mouth every 8 (eight) hours as needed for Pain.  0    acetaminophen 500 MG Oral Tab Take 1 tablet (500 mg total) by mouth 3 (three) times daily as needed for Pain.  0    triamcinolone acetonide 0.1 % External Ointment         Past Medical History:   Diagnosis Date    Acute sinusitis, unspecified     Bulging discs     Encounter for screening colonoscopy 4/8/2015    Dr.Gonzalo English     Headache(784.0)     Herpes zoster without mention of complication     Lumbago     Myalgia and myositis, unspecified     Personal history of other malignant neoplasm of skin 3/23/2016    Hx BCC x2    Rash and other nonspecific skin eruption     Toxoplasmosis 1982    Unspecified vitamin D deficiency       Past Surgical History:   Procedure Laterality Date    APPENDECTOMY      COLONOSCOPY  1/21/05, 04/08/2015    Pedrito Melendrez MD, Denise Lynne    KNEE ARTHROSCOPY Left 10/21/16--Dr. Adelaida Romero    partial medial and lateral meniscectomies    KNEE REPLACEMENT SURGERY      KNEE SURGERY Left 04/01/2019    TKR    OTHER  1961    bladder surgery, removal of calcified lesion    OTHER SURGICAL HISTORY  1/2011    injection    OTHER SURGICAL HISTORY N/A 08/01/1962    Calcium deposit removed from urinary bladder    OTHER SURGICAL HISTORY  10/29/2019    cystoscopy    SKIN SURGERY      BCC - face & R dorsal hand (treated by outside [facility] physician)    TONSILLECTOMY      w/adenoidectomy    UPPER GI ENDOSCOPY,BIOPSY      12-30-13 [facility]       Family History   Problem Relation Age of Onset    Arthritis Mother     Breast Cancer Mother 80    Other (Other) Mother     Other (cerebral artery aneurysm) Father     Other (diabetes mellitus) Father     Other (leukemia) Father     Breast Cancer Paternal Aunt       Social History    Socioeconomic History      Marital status:      Tobacco Use      Smoking status: Never      Smokeless tobacco: Never    Vaping Use      Vaping Use: Never used    Substance and Sexual Activity      Alcohol use: Yes        Comment: CAGE 8/12/20      Drug use: No      Sexual activity: Yes        Partners: Male        Comment:     Other Topics      Concerns:        Caffeine Concern: Yes          1 cup a day        Exercise: No          REVIEW OF SYSTEMS:   Today reviewed systens as documented below  GENERAL HEALTH: feels well otherwise  SKIN: Refer to exam below  RESPIRATORY: denies shortness of breath with exertion  CARDIOVASCULAR: denies chest pain on exertion  GI: denies abdominal pain and denies heartburn  NEURO: denies headaches    EXAM:   /68 (BP Location: Right arm, Patient Position: Sitting, Cuff Size: adult)   LMP  (LMP Unknown)   GENERAL: well developed, well nourished, in no apparent distress  EXTREMITIES:     Derm: Skin is warm and dry. Well-healed cicatrix to left foot at surgical site. No ecchymosis or other concerns. Vascular: Pedal pulses are palpable. No edema. CFT intact to digits. Neuro: Active and pain sensation intact to digits. MSK: Pain on palpation noted to fourth and fifth TMT J of left foot at surgical site. No pain with eversion against resistance. No pain to peroneal tendons. Compartments are soft and compressible. Gait is unstable and antalgic. ASSESSMENT AND PLAN:   Diagnoses and all orders for this visit:    Arthrosis of midfoot, left  -     dexamethasone (Decadron) 4 MG/ML injection 2 mg  -     triamcinolone acetonide (Kenalog-10) 10 mg/mL injection    Foot pain, left    Gait instability    Arthritis of midfoot        Plan:     -Patient examined, chart history reviewed. -Patient has experienced some improvement in function and pain to her foot since last visit. She should continue ambulate with supportive sock any tennis shoes and avoid walking barefoot. Also recommend continuing physical therapy and using compression stocking.  -Patient is interested in cortisone injection. Discussed with patient that we can perform up to 3 of these per year at her surgical site. She would like to receive cortisone injection today. Informed consent was obtained. After prepping site with alcohol, administered cortisone injection to the left fourth and fifth TMT J's consisting of 1 cc of 0.5% Marcaine plain, 0.5 cc of dexamethasone, and 0.5 cc Kenalog 10. Patient tolerated injection well and there are no complications.   Site was dressed with Band-Aid.  -Patient will continue to focus on support to her foot and physical therapy. She can follow-up as needed for reevaluation or possible additional cortisone injection. All questions were answered to satisfaction. The patient indicates understanding of these issues and agrees to the plan.     Margarito Tinoco DPM

## 2023-07-16 NOTE — PATIENT INSTRUCTIONS
Monitor response to cortisone injection. Ambulate with supportive shoes and avoid walking barefoot. Follow-up in 3 to 4 months for reevaluation or sooner if other concerns arise.

## 2023-07-19 RX ORDER — CELECOXIB 200 MG/1
200 CAPSULE ORAL DAILY
Qty: 30 CAPSULE | Refills: 2 | Status: SHIPPED | OUTPATIENT
Start: 2023-07-19

## 2023-07-19 NOTE — TELEPHONE ENCOUNTER
Last visit with Dr Jazzmine Quinn on 7/14/23  Last rx given by Dr Leonidas Veloz on 4/27/23 #30 with 2 refill

## 2023-07-26 ENCOUNTER — LAB ENCOUNTER (OUTPATIENT)
Dept: LAB | Age: 83
End: 2023-07-26
Attending: NURSE PRACTITIONER
Payer: MEDICARE

## 2023-07-26 DIAGNOSIS — E78.5 DYSLIPIDEMIA: ICD-10-CM

## 2023-07-26 DIAGNOSIS — Z00.00 ROUTINE GENERAL MEDICAL EXAMINATION AT A HEALTH CARE FACILITY: ICD-10-CM

## 2023-07-26 LAB
ALBUMIN SERPL-MCNC: 3.6 G/DL (ref 3.4–5)
ALBUMIN/GLOB SERPL: 1.1 {RATIO} (ref 1–2)
ALP LIVER SERPL-CCNC: 73 U/L
ALT SERPL-CCNC: 22 U/L
ANION GAP SERPL CALC-SCNC: 5 MMOL/L (ref 0–18)
AST SERPL-CCNC: 24 U/L (ref 15–37)
BASOPHILS # BLD AUTO: 0.07 X10(3) UL (ref 0–0.2)
BASOPHILS NFR BLD AUTO: 1.3 %
BILIRUB SERPL-MCNC: 0.5 MG/DL (ref 0.1–2)
BUN BLD-MCNC: 14 MG/DL (ref 7–18)
CALCIUM BLD-MCNC: 9.1 MG/DL (ref 8.5–10.1)
CHLORIDE SERPL-SCNC: 109 MMOL/L (ref 98–112)
CHOLEST SERPL-MCNC: 271 MG/DL (ref ?–200)
CO2 SERPL-SCNC: 27 MMOL/L (ref 21–32)
CREAT BLD-MCNC: 0.86 MG/DL
EGFRCR SERPLBLD CKD-EPI 2021: 66 ML/MIN/1.73M2 (ref 60–?)
EOSINOPHIL # BLD AUTO: 0.08 X10(3) UL (ref 0–0.7)
EOSINOPHIL NFR BLD AUTO: 1.5 %
ERYTHROCYTE [DISTWIDTH] IN BLOOD BY AUTOMATED COUNT: 15.3 %
FASTING PATIENT LIPID ANSWER: YES
FASTING STATUS PATIENT QL REPORTED: YES
GLOBULIN PLAS-MCNC: 3.2 G/DL (ref 2.8–4.4)
GLUCOSE BLD-MCNC: 97 MG/DL (ref 70–99)
HCT VFR BLD AUTO: 41.7 %
HDLC SERPL-MCNC: 127 MG/DL (ref 40–59)
HGB BLD-MCNC: 13.8 G/DL
IMM GRANULOCYTES # BLD AUTO: 0.02 X10(3) UL (ref 0–1)
IMM GRANULOCYTES NFR BLD: 0.4 %
LDLC SERPL CALC-MCNC: 131 MG/DL (ref ?–100)
LYMPHOCYTES # BLD AUTO: 2.06 X10(3) UL (ref 1–4)
LYMPHOCYTES NFR BLD AUTO: 38.4 %
MCH RBC QN AUTO: 31.4 PG (ref 26–34)
MCHC RBC AUTO-ENTMCNC: 33.1 G/DL (ref 31–37)
MCV RBC AUTO: 95 FL
MONOCYTES # BLD AUTO: 0.43 X10(3) UL (ref 0.1–1)
MONOCYTES NFR BLD AUTO: 8 %
NEUTROPHILS # BLD AUTO: 2.7 X10 (3) UL (ref 1.5–7.7)
NEUTROPHILS # BLD AUTO: 2.7 X10(3) UL (ref 1.5–7.7)
NEUTROPHILS NFR BLD AUTO: 50.4 %
NONHDLC SERPL-MCNC: 144 MG/DL (ref ?–130)
OSMOLALITY SERPL CALC.SUM OF ELEC: 292 MOSM/KG (ref 275–295)
PLATELET # BLD AUTO: 220 10(3)UL (ref 150–450)
POTASSIUM SERPL-SCNC: 4.2 MMOL/L (ref 3.5–5.1)
PROT SERPL-MCNC: 6.8 G/DL (ref 6.4–8.2)
RBC # BLD AUTO: 4.39 X10(6)UL
SODIUM SERPL-SCNC: 141 MMOL/L (ref 136–145)
TRIGL SERPL-MCNC: 81 MG/DL (ref 30–149)
TSI SER-ACNC: 1.28 MIU/ML (ref 0.36–3.74)
VLDLC SERPL CALC-MCNC: 15 MG/DL (ref 0–30)
WBC # BLD AUTO: 5.4 X10(3) UL (ref 4–11)

## 2023-07-26 PROCEDURE — 36415 COLL VENOUS BLD VENIPUNCTURE: CPT

## 2023-07-26 PROCEDURE — 80053 COMPREHEN METABOLIC PANEL: CPT

## 2023-07-26 PROCEDURE — 84443 ASSAY THYROID STIM HORMONE: CPT

## 2023-07-26 PROCEDURE — 85025 COMPLETE CBC W/AUTO DIFF WBC: CPT

## 2023-07-26 PROCEDURE — 80061 LIPID PANEL: CPT

## 2023-08-02 ENCOUNTER — OFFICE VISIT (OUTPATIENT)
Dept: INTERNAL MEDICINE CLINIC | Facility: CLINIC | Age: 83
End: 2023-08-02
Payer: MEDICARE

## 2023-08-02 VITALS
BODY MASS INDEX: 23.4 KG/M2 | OXYGEN SATURATION: 96 % | HEART RATE: 80 BPM | HEIGHT: 63.78 IN | WEIGHT: 135.38 LBS | RESPIRATION RATE: 16 BRPM | TEMPERATURE: 97 F | SYSTOLIC BLOOD PRESSURE: 108 MMHG | DIASTOLIC BLOOD PRESSURE: 56 MMHG

## 2023-08-02 DIAGNOSIS — E78.5 DYSLIPIDEMIA: ICD-10-CM

## 2023-08-02 DIAGNOSIS — I65.23 BILATERAL CAROTID ARTERY STENOSIS: ICD-10-CM

## 2023-08-02 DIAGNOSIS — E53.8 VITAMIN B 12 DEFICIENCY: ICD-10-CM

## 2023-08-02 DIAGNOSIS — M85.88 OSTEOPENIA OF SPINE: ICD-10-CM

## 2023-08-02 DIAGNOSIS — Z00.00 ENCOUNTER FOR ANNUAL HEALTH EXAMINATION: Primary | ICD-10-CM

## 2023-08-02 DIAGNOSIS — E55.9 VITAMIN D DEFICIENCY: ICD-10-CM

## 2023-08-02 DIAGNOSIS — F41.9 ANXIETY: ICD-10-CM

## 2023-08-02 DIAGNOSIS — R41.3 MEMORY CHANGE: ICD-10-CM

## 2023-08-02 DIAGNOSIS — R31.29 MICROHEMATURIA: ICD-10-CM

## 2023-08-02 DIAGNOSIS — M47.816 LUMBAR FACET ARTHROPATHY: ICD-10-CM

## 2023-08-02 DIAGNOSIS — M17.11 PRIMARY OSTEOARTHRITIS OF RIGHT KNEE: ICD-10-CM

## 2023-08-02 DIAGNOSIS — Z85.828 HISTORY OF MALIGNANT NEOPLASM OF SKIN: ICD-10-CM

## 2023-08-02 DIAGNOSIS — M47.818 ARTHRITIS OF SACROILIAC JOINT: ICD-10-CM

## 2023-08-02 DIAGNOSIS — Z96.652 HISTORY OF TOTAL LEFT KNEE REPLACEMENT (TKR): ICD-10-CM

## 2023-08-02 DIAGNOSIS — R92.2 DENSE BREAST: ICD-10-CM

## 2023-08-02 DIAGNOSIS — M79.672 LEFT FOOT PAIN: ICD-10-CM

## 2023-08-02 DIAGNOSIS — M47.817 SPONDYLOSIS OF LUMBOSACRAL REGION WITHOUT MYELOPATHY OR RADICULOPATHY: ICD-10-CM

## 2023-08-02 DIAGNOSIS — M77.50 TENDONITIS OF FOOT: ICD-10-CM

## 2023-08-02 DIAGNOSIS — N32.81 OAB (OVERACTIVE BLADDER): ICD-10-CM

## 2023-08-02 DIAGNOSIS — J30.9 ALLERGIC RHINITIS, UNSPECIFIED SEASONALITY, UNSPECIFIED TRIGGER: ICD-10-CM

## 2023-08-02 PROBLEM — R92.30 DENSE BREAST: Status: ACTIVE | Noted: 2023-08-02

## 2023-08-02 PROCEDURE — G0439 PPPS, SUBSEQ VISIT: HCPCS | Performed by: NURSE PRACTITIONER

## 2023-08-02 PROCEDURE — 99214 OFFICE O/P EST MOD 30 MIN: CPT | Performed by: NURSE PRACTITIONER

## 2023-09-15 ENCOUNTER — LAB ENCOUNTER (OUTPATIENT)
Dept: LAB | Age: 83
End: 2023-09-15
Attending: NURSE PRACTITIONER
Payer: MEDICARE

## 2023-09-15 DIAGNOSIS — E55.9 VITAMIN D DEFICIENCY: ICD-10-CM

## 2023-09-15 DIAGNOSIS — E53.8 VITAMIN B 12 DEFICIENCY: ICD-10-CM

## 2023-09-15 DIAGNOSIS — R41.3 MEMORY CHANGE: ICD-10-CM

## 2023-09-15 DIAGNOSIS — R32 URINARY INCONTINENCE, UNSPECIFIED TYPE: ICD-10-CM

## 2023-09-15 LAB
BILIRUB UR QL STRIP.AUTO: NEGATIVE
COLOR UR AUTO: YELLOW
GLUCOSE UR STRIP.AUTO-MCNC: NORMAL MG/DL
HYALINE CASTS #/AREA URNS AUTO: PRESENT /LPF
KETONES UR STRIP.AUTO-MCNC: NEGATIVE MG/DL
LEUKOCYTE ESTERASE UR QL STRIP.AUTO: 500
PH UR STRIP.AUTO: 7 [PH] (ref 5–8)
PROT UR STRIP.AUTO-MCNC: 20 MG/DL
SP GR UR STRIP.AUTO: 1.01 (ref 1–1.03)
UROBILINOGEN UR STRIP.AUTO-MCNC: NORMAL MG/DL
VIT B12 SERPL-MCNC: 299 PG/ML (ref 193–986)
VIT D+METAB SERPL-MCNC: 23.7 NG/ML (ref 30–100)
WBC #/AREA URNS AUTO: >50 /HPF
WBC CLUMPS UR QL AUTO: PRESENT /HPF

## 2023-09-15 PROCEDURE — 87088 URINE BACTERIA CULTURE: CPT

## 2023-09-15 PROCEDURE — 87186 SC STD MICRODIL/AGAR DIL: CPT

## 2023-09-15 PROCEDURE — 87086 URINE CULTURE/COLONY COUNT: CPT

## 2023-09-15 PROCEDURE — 36415 COLL VENOUS BLD VENIPUNCTURE: CPT

## 2023-09-15 PROCEDURE — 82607 VITAMIN B-12: CPT

## 2023-09-15 PROCEDURE — 81001 URINALYSIS AUTO W/SCOPE: CPT

## 2023-09-15 PROCEDURE — 82306 VITAMIN D 25 HYDROXY: CPT

## 2023-09-16 DIAGNOSIS — E53.8 VITAMIN B 12 DEFICIENCY: ICD-10-CM

## 2023-09-16 DIAGNOSIS — E55.9 VITAMIN D DEFICIENCY: Primary | ICD-10-CM

## 2023-09-16 RX ORDER — CEPHALEXIN 500 MG/1
500 CAPSULE ORAL 2 TIMES DAILY
Qty: 14 CAPSULE | Refills: 0 | Status: SHIPPED | OUTPATIENT
Start: 2023-09-16

## 2023-09-16 RX ORDER — ERGOCALCIFEROL 1.25 MG/1
50000 CAPSULE ORAL WEEKLY
Qty: 12 CAPSULE | Refills: 0 | Status: SHIPPED | OUTPATIENT
Start: 2023-09-16 | End: 2023-12-15

## 2023-10-06 RX ORDER — LOSARTAN POTASSIUM 25 MG/1
25 TABLET ORAL DAILY
Qty: 90 TABLET | Refills: 0 | Status: SHIPPED | OUTPATIENT
Start: 2023-10-06

## 2023-10-11 NOTE — TELEPHONE ENCOUNTER
Celebrex 200mg refill request    Last Rx: 7/19/23, #30, 2 refills  LOV: 7/14/23.  Patient has upcoming appointment on 10/13/23    Please review and sign if appropriate

## 2023-10-12 RX ORDER — CELECOXIB 200 MG/1
200 CAPSULE ORAL DAILY
Qty: 30 CAPSULE | Refills: 2 | Status: SHIPPED | OUTPATIENT
Start: 2023-10-12

## 2023-10-13 ENCOUNTER — OFFICE VISIT (OUTPATIENT)
Dept: PODIATRY CLINIC | Facility: CLINIC | Age: 83
End: 2023-10-13

## 2023-10-13 VITALS — DIASTOLIC BLOOD PRESSURE: 80 MMHG | SYSTOLIC BLOOD PRESSURE: 140 MMHG

## 2023-10-13 DIAGNOSIS — M19.079 ARTHRITIS OF MIDFOOT: ICD-10-CM

## 2023-10-13 DIAGNOSIS — Z98.890 STATUS POST FOOT SURGERY: ICD-10-CM

## 2023-10-13 DIAGNOSIS — M79.672 FOOT PAIN, LEFT: ICD-10-CM

## 2023-10-13 DIAGNOSIS — M19.072 ARTHROSIS OF MIDFOOT, LEFT: Primary | ICD-10-CM

## 2023-10-13 DIAGNOSIS — R26.81 GAIT INSTABILITY: ICD-10-CM

## 2023-10-13 RX ORDER — MELATONIN
1000 DAILY
COMMUNITY

## 2023-10-13 RX ORDER — DEXAMETHASONE SODIUM PHOSPHATE 4 MG/ML
2 VIAL (ML) INJECTION ONCE
Status: COMPLETED | OUTPATIENT
Start: 2023-10-13 | End: 2023-10-13

## 2023-10-13 RX ADMIN — DEXAMETHASONE SODIUM PHOSPHATE 2 MG: 4 MG/ML VIAL (ML) INJECTION at 10:44:00

## 2023-10-13 NOTE — PROGRESS NOTES
Per Dr. Jazzmine Quinn to draw up .5ml of dexamethasone sodium phosphate. .5ml Kenalog. 10 and 1ml marcaine 0.5% for a left foot  foot injection.

## 2023-10-13 NOTE — PROGRESS NOTES
Compa Bull is a 78year old female. Patient presents with: Injection: Left foot injection. Pain 2/10. HPI:     Patient is a pleasant 57-year-old female who presents to clinic with daughter for evaluation of continued left foot pain. Patient does have history of fourth and fifth TMT J arthroplasty with Dr. Prema Laurent. She relates that last cortisone injection worked well for her but her pain is starting to return. She is rating the pain at a 2 out of 10. She is hoping for another injection at this time. It has been approximately 4 months since last injection. She continues to ambulate with supportive shoes and inserts. No other complaints are mentioned. Allergies: Calcium Acetate   Current Outpatient Medications   Medication Sig Dispense Refill    cyanocobalamin 1000 MCG Oral Tab Take 1 tablet (1,000 mcg total) by mouth daily. celecoxib 200 MG Oral Cap Take 1 capsule (200 mg total) by mouth daily. 30 capsule 2    losartan 25 MG Oral Tab TAKE 1 TABLET(25 MG) BY MOUTH DAILY 90 tablet 0    cephalexin 500 MG Oral Cap Take 1 capsule (500 mg total) by mouth 2 (two) times daily. 14 capsule 0    ergocalciferol 1.25 MG (20934 UT) Oral Cap Take 1 capsule (50,000 Units total) by mouth once a week. 12 capsule 0    Solifenacin Succinate 10 MG Oral Tab Take 1 tablet (10 mg total) by mouth daily. 90 tablet 1    sertraline 50 MG Oral Tab Take 1 tablet (50 mg total) by mouth daily. 90 tablet 2    cephalexin 500 MG Oral Cap Take 1 capsule (500 mg total) by mouth 2 (two) times daily. 14 capsule 0    UREA 20 INTENSIVE HYDRATING 20 % External Cream MIX WITH TRIAMCINOLONE AND APPLY TOPICALLY TO THE AFFECTED AREA EVERY DAY AS DIRECTED. diclofenac 1 % External Gel Apply 2 g topically 2 (two) times daily as needed.  150 g 1    ibuprofen 200 MG Oral Tab Take 1 tablet (200 mg total) by mouth every 8 (eight) hours as needed for Pain.  0    acetaminophen 500 MG Oral Tab Take 1 tablet (500 mg total) by mouth 3 (three) times daily as needed for Pain.  0    triamcinolone acetonide 0.1 % External Ointment         Past Medical History:   Diagnosis Date    Acute sinusitis, unspecified     Bulging discs     Encounter for screening colonoscopy 4/8/2015    Dr.Gonzalo English     Headache(784.0)     Herpes zoster without mention of complication     Lumbago     Myalgia and myositis, unspecified     Personal history of other malignant neoplasm of skin 3/23/2016    Hx BCC x2    Rash and other nonspecific skin eruption     Toxoplasmosis 1982    Unspecified vitamin D deficiency       Past Surgical History:   Procedure Laterality Date    APPENDECTOMY      COLONOSCOPY  1/21/05, 04/08/2015    Yeison Wolfe MD, Lisa Lugo    KNEE ARTHROSCOPY Left 10/21/16--Dr. Debbie Villar    partial medial and lateral meniscectomies    KNEE REPLACEMENT SURGERY      KNEE SURGERY Left 04/01/2019    TKR    OTHER  1961    bladder surgery, removal of calcified lesion    OTHER SURGICAL HISTORY  1/2011    injection    OTHER SURGICAL HISTORY N/A 08/01/1962    Calcium deposit removed from urinary bladder    OTHER SURGICAL HISTORY  10/29/2019    cystoscopy    SKIN SURGERY      BCC - face & R dorsal hand (treated by outside [facility] physician)    TONSILLECTOMY      w/adenoidectomy    UPPER GI ENDOSCOPY,BIOPSY      12-30-13 ron       Family History   Problem Relation Age of Onset    Arthritis Mother     Breast Cancer Mother 80    Other (Other) Mother     Other (cerebral artery aneurysm) Father     Other (diabetes mellitus) Father     Other (leukemia) Father     Breast Cancer Paternal Aunt       Social History    Socioeconomic History      Marital status:      Tobacco Use      Smoking status: Never      Smokeless tobacco: Never    Vaping Use      Vaping Use: Never used    Substance and Sexual Activity      Alcohol use: Yes        Comment: CAGE 8/12/20      Drug use: No      Sexual activity: Yes        Partners: Male        Comment:     Other Topics Concerns:        Caffeine Concern: Yes          1 cup a day        Exercise: No          REVIEW OF SYSTEMS:   Today reviewed systens as documented below  GENERAL HEALTH: feels well otherwise  SKIN: Refer to exam below  RESPIRATORY: denies shortness of breath with exertion  CARDIOVASCULAR: denies chest pain on exertion  GI: denies abdominal pain and denies heartburn  NEURO: denies headaches    EXAM:   LMP  (LMP Unknown)   GENERAL: well developed, well nourished, in no apparent distress  EXTREMITIES:     Derm: Skin is warm and dry. Well-healed cicatrix to left foot at surgical site. No ecchymosis or other concerns. Vascular: Pedal pulses are palpable. No edema. CFT intact to digits. Neuro: Active and pain sensation intact to digits. MSK: Pain on palpation noted to fourth and fifth TMT J of left foot at surgical site. No pain with eversion against resistance. No pain to peroneal tendons. Compartments are soft and compressible. Gait is unstable and antalgic. ASSESSMENT AND PLAN:   Diagnoses and all orders for this visit:    Arthrosis of midfoot, left  -     dexamethasone (Decadron) 4 MG/ML injection 2 mg  -     triamcinolone acetonide (Kenalog-10) 10 mg/mL injection        Plan:     -Patient examined, chart history reviewed. -Patient has experienced some improvement in function and pain to her foot since last visit. She should continue ambulate with supportive sock any tennis shoes and avoid walking barefoot. Also recommend continuing physical therapy and using compression stocking.  -Patient is interested in cortisone injection. Discussed with patient that we can perform up to 3 of these per year at her surgical site. She would like to receive cortisone injection today. It has been 3 to 4 months since last injection. Informed consent was obtained.   After prepping site with alcohol, administered cortisone injection to the left fourth and fifth TMT J's consisting of 1 cc of 0.5% Marcaine plain, 0.5 cc of dexamethasone, and 0.5 cc Kenalog 10. Patient tolerated injection well and there are no complications. Site was dressed with Band-Aid.  -Patient will continue to focus on support to her foot and physical therapy. She can follow-up as needed for reevaluation or possible additional cortisone injection. All questions were answered to satisfaction. The patient indicates understanding of these issues and agrees to the plan.     Mary Long DPM

## 2023-10-27 ENCOUNTER — OFFICE VISIT (OUTPATIENT)
Dept: NEUROLOGY | Facility: CLINIC | Age: 83
End: 2023-10-27

## 2023-10-27 VITALS
RESPIRATION RATE: 16 BRPM | SYSTOLIC BLOOD PRESSURE: 128 MMHG | HEART RATE: 88 BPM | DIASTOLIC BLOOD PRESSURE: 64 MMHG | OXYGEN SATURATION: 97 % | BODY MASS INDEX: 24 KG/M2 | WEIGHT: 139.19 LBS

## 2023-10-27 DIAGNOSIS — R41.3 MEMORY CHANGE: Primary | ICD-10-CM

## 2023-10-27 PROCEDURE — 99204 OFFICE O/P NEW MOD 45 MIN: CPT | Performed by: OTHER

## 2023-10-27 NOTE — PROGRESS NOTES
Patient family states decrease in short term memory. Patient states memory changes started about 2 years ago. Patient denies HA. Denies facial numbness or tingling. Patient denies changes in speech or balance.

## 2023-11-08 RX ORDER — CEPHALEXIN 500 MG/1
500 CAPSULE ORAL 2 TIMES DAILY
Qty: 14 CAPSULE | Refills: 0 | OUTPATIENT
Start: 2023-11-08

## 2023-11-08 NOTE — TELEPHONE ENCOUNTER
Last OV: Wellness 08/02/23 SD    Future Appointments   Date Time Provider Department Center   11/29/2023  1:45 PM [facility] (1.5T) [facility] [address]   2/9/2024 10:30 AM Freddy Sanders DPM [id number]   2/13/2024  1:00 PM Jessy Jerry APRN [facility]        Latest labs:   CMP,CBC, Lipid, TSH 07/26/23    Latest RX:    Disp Refills Start End    celecoxib 200 MG Oral Cap 30 capsule 2 10/12/2023     Sig - Route: Take 1 capsule (200 mg total) by mouth daily. - Oral    Sent to pharmacy as: Celecoxib 200 MG Oral Capsule (CeleBREX)    E-Prescribing Status: Receipt confirmed by pharmacy (10/12/2023  4:56 PM CDT)      Per protocol  Non-protocol

## 2023-11-20 DIAGNOSIS — N32.81 OAB (OVERACTIVE BLADDER): Primary | ICD-10-CM

## 2023-11-21 RX ORDER — SOLIFENACIN SUCCINATE 10 MG/1
10 TABLET, FILM COATED ORAL DAILY
Qty: 90 TABLET | Refills: 0 | Status: SHIPPED | OUTPATIENT
Start: 2023-11-21

## 2023-11-29 ENCOUNTER — HOSPITAL ENCOUNTER (OUTPATIENT)
Dept: MRI IMAGING | Age: 83
Discharge: HOME | End: 2023-11-29
Attending: Other
Payer: MEDICARE

## 2023-11-29 DIAGNOSIS — R41.3 MEMORY CHANGE: ICD-10-CM

## 2023-11-29 PROCEDURE — 70551 MRI BRAIN STEM W/O DYE: CPT | Performed by: OTHER

## 2023-12-12 RX ORDER — ERGOCALCIFEROL 1.25 MG/1
50000 CAPSULE ORAL WEEKLY
Qty: 12 CAPSULE | Refills: 0 | OUTPATIENT
Start: 2023-12-12

## 2023-12-12 NOTE — TELEPHONE ENCOUNTER
Last VISIT 08/02/2023-SD-Annual Physical    Last CPE 08/02/2023-SD    Last REFILL   ergocalciferol 1.25 MG (39807 UT) Oral Cap Take 1 capsule (50,000 Units total) by mouth once a week. 12 capsule 0   Last LABS 07/26/2023 CBC,CMP,lipid,TSH    Future Appointments   Date Time Provider Audie Dooley   2/9/2024 10:30 AM Marni Valdivia [id number]   2/13/2024  1:00 PM ANASTACIO Rodgers [facility] [address] [facility]         Per PROTOCOL? No Discontinued 10/27/2023    Please Approve or Deny.

## 2023-12-22 DIAGNOSIS — E78.5 DYSLIPIDEMIA: Primary | ICD-10-CM

## 2023-12-24 RX ORDER — LOSARTAN POTASSIUM 25 MG/1
25 TABLET ORAL DAILY
Qty: 90 TABLET | Refills: 0 | Status: SHIPPED | OUTPATIENT
Start: 2023-12-24

## 2023-12-27 RX ORDER — CELECOXIB 200 MG/1
200 CAPSULE ORAL DAILY
Qty: 30 CAPSULE | Refills: 2 | Status: SHIPPED | OUTPATIENT
Start: 2023-12-27

## 2023-12-27 NOTE — TELEPHONE ENCOUNTER
Pt last seen by Dr Ruthy Sheikh on 10/13/23  Last rx given 10/12/23 #30 with 2 refills. Do you approve?

## 2023-12-28 ENCOUNTER — TELEPHONE (OUTPATIENT)
Dept: PODIATRY CLINIC | Facility: CLINIC | Age: 83
End: 2023-12-28

## 2023-12-28 NOTE — TELEPHONE ENCOUNTER
[facility]ication [facility] [place] [address]    S/w her desired [facility] [address] and they state that they can take the medication from the other [facility] queue and can fill it there. S/w Daughter and informed her that we changed order to desired pharmacy.  No other questions at this time

## 2023-12-28 NOTE — TELEPHONE ENCOUNTER
Asking for rx sent yesterday - celecoxib 200 MG Oral Cap  [facility] [address] in [place]

## 2024-01-17 RX ORDER — CELECOXIB 200 MG/1
200 CAPSULE ORAL DAILY
Qty: 30 CAPSULE | Refills: 2 | OUTPATIENT
Start: 2024-01-17

## 2024-02-09 ENCOUNTER — OFFICE VISIT (OUTPATIENT)
Dept: PODIATRY CLINIC | Facility: CLINIC | Age: 84
End: 2024-02-09
Payer: MEDICARE

## 2024-02-09 DIAGNOSIS — M19.072 ARTHROSIS OF MIDFOOT, LEFT: Primary | ICD-10-CM

## 2024-02-09 DIAGNOSIS — R26.81 GAIT INSTABILITY: ICD-10-CM

## 2024-02-09 DIAGNOSIS — M79.672 FOOT PAIN, LEFT: ICD-10-CM

## 2024-02-09 DIAGNOSIS — M19.072 ARTHRITIS OF LEFT MIDFOOT: ICD-10-CM

## 2024-02-09 PROCEDURE — 99213 OFFICE O/P EST LOW 20 MIN: CPT | Performed by: STUDENT IN AN ORGANIZED HEALTH CARE EDUCATION/TRAINING PROGRAM

## 2024-02-11 RX ORDER — DEXAMETHASONE SODIUM PHOSPHATE 4 MG/ML
2 VIAL (ML) INJECTION ONCE
Status: COMPLETED | OUTPATIENT
Start: 2024-02-11 | End: 2024-02-12

## 2024-02-11 NOTE — PROGRESS NOTES
Melva Mckeon is a 83 year old female.   Chief Complaint   Patient presents with    Follow - Up     Left foot- patient denies pain- cortisone injection          HPI:     Patient is a pleasant 83-year-old female who presents to clinic with daughter for evaluation of continued left foot pain.  Patient does have history of fourth and fifth TMT J arthroplasty with Dr. Rodriguez.  She relates that last cortisone injection worked well for her but her pain is starting to return.  She is hoping for another injection at this time.  It has been approximately 4 months since last injection.  She continues to ambulate with supportive shoes and inserts and compression sleeve which are working well for her. No other complaints are mentioned.     Allergies: Calcium acetate   Current Outpatient Medications   Medication Sig Dispense Refill    celecoxib 200 MG Oral Cap Take 1 capsule (200 mg total) by mouth daily. 30 capsule 2    LOSARTAN 25 MG Oral Tab TAKE 1 TABLET(25 MG) BY MOUTH DAILY 90 tablet 0    Solifenacin Succinate 10 MG Oral Tab TAKE 1 TABLET(10 MG) BY MOUTH DAILY 90 tablet 0    cyanocobalamin 1000 MCG Oral Tab Take 1 tablet (1,000 mcg total) by mouth daily.      sertraline 50 MG Oral Tab Take 1 tablet (50 mg total) by mouth daily. 90 tablet 2    UREA 20 INTENSIVE HYDRATING 20 % External Cream MIX WITH TRIAMCINOLONE AND APPLY TOPICALLY TO THE AFFECTED AREA EVERY DAY AS DIRECTED.      diclofenac 1 % External Gel Apply 2 g topically 2 (two) times daily as needed. 150 g 1    ibuprofen 200 MG Oral Tab Take 1 tablet (200 mg total) by mouth every 8 (eight) hours as needed for Pain.  0    acetaminophen 500 MG Oral Tab Take 1 tablet (500 mg total) by mouth 3 (three) times daily as needed for Pain.  0    triamcinolone acetonide 0.1 % External Ointment         Past Medical History:   Diagnosis Date    Acute sinusitis, unspecified     Bulging discs     Encounter for screening colonoscopy 4/8/2015    Dr.Gonzalo English      Headache(784.0)     Herpes zoster without mention of complication     Lumbago     Myalgia and myositis, unspecified     Personal history of other malignant neoplasm of skin 3/23/2016    Hx BCC x2    Rash and other nonspecific skin eruption     Toxoplasmosis 1982    Unspecified vitamin D deficiency       Past Surgical History:   Procedure Laterality Date    APPENDECTOMY      COLONOSCOPY  1/21/05, 04/08/2015    Dereck Marina MD, Kyle Tameka    KNEE ARTHROSCOPY Left 10/21/16--Dr. Smith    partial medial and lateral meniscectomies    KNEE REPLACEMENT SURGERY      KNEE SURGERY Left 04/01/2019    TKR    OTHER  1961    bladder surgery, removal of calcified lesion    OTHER SURGICAL HISTORY  1/2011    injection    OTHER SURGICAL HISTORY N/A 08/01/1962    Calcium deposit removed from urinary bladder    OTHER SURGICAL HISTORY  10/29/2019    cystoscopy    SKIN SURGERY      BCC - face & R dorsal hand (treated by outside [facility] [place] [place] physician)    TONSILLECTOMY      w/adenoidectomy    UPPER GI ENDOSCOPY,BIOPSY      12-30-13 tameka       Family History   Problem Relation Age of Onset    Arthritis Mother     Breast Cancer Mother 89    Other (Other) Mother     Other (cerebral artery aneurysm) Father     Other (diabetes mellitus) Father     Other (leukemia) Father     Breast Cancer Paternal Aunt       Social History     Socioeconomic History    Marital status:    Tobacco Use    Smoking status: Never    Smokeless tobacco: Never   Vaping Use    Vaping Use: Never used   Substance and Sexual Activity    Alcohol use: Yes     Comment: CAGE 8/12/20    Drug use: No    Sexual activity: Yes     Partners: Male     Comment:    Other Topics Concern    Caffeine Concern Yes     Comment: 1 cup a day    Exercise No           REVIEW OF SYSTEMS:   Today reviewed systens as documented below  GENERAL HEALTH: feels well otherwise  SKIN: Refer to exam below  RESPIRATORY: denies shortness of breath with exertion  CARDIOVASCULAR: denies chest  pain on exertion  GI: denies abdominal pain and denies heartburn  NEURO: denies headaches    EXAM:   LMP  (LMP Unknown)   GENERAL: well developed, well nourished, in no apparent distress  EXTREMITIES:     Derm: Skin is warm and dry.  Well-healed cicatrix to left foot at surgical site.  No ecchymosis or other concerns.    Vascular: Pedal pulses are palpable.  No edema.  CFT intact to digits.    Neuro: Active and pain sensation intact to digits.    MSK: Pain on palpation noted to fourth and fifth TMT J of left foot at surgical site.  No pain with eversion against resistance.  No pain to peroneal tendons.  Compartments are soft and compressible.  Gait is unstable and antalgic.    ASSESSMENT AND PLAN:   Diagnoses and all orders for this visit:    Arthrosis of midfoot, left  -     dexamethasone (Decadron) 4 MG/ML injection 2 mg  -     triamcinolone acetonide (Kenalog-10) 10 mg/mL injection    Foot pain, left    Gait instability    Arthritis of left midfoot        Plan:     -Patient examined, chart history reviewed.    -Patient has experienced some improvement in function and pain to her foot since last visit.  She should continue ambulate with supportive sock any tennis shoes and avoid walking barefoot.  Also recommend continuing physical therapy and using compression stocking.  -Patient is interested in cortisone injection.  Discussed with patient that we can perform up to 3 of these per year at her surgical site.  She would like to receive cortisone injection today.  It has been 4 months since last injection.  Informed consent was obtained.  After prepping site with alcohol, administered cortisone injection to the left fourth and fifth TMT J's consisting of 1 cc of 0.5% Marcaine plain, 0.5 cc of dexamethasone, and 0.5 cc Kenalog 10.  Patient tolerated injection well and there are no complications.  Site was dressed with Band-Aid.  -Patient will continue to focus on support to her foot and physical therapy.  She can  follow-up as needed for reevaluation or possible additional cortisone injection.  All questions were answered to satisfaction.    The patient indicates understanding of these issues and agrees to the plan.    Freddy Sanders DPM

## 2024-02-12 VITALS — DIASTOLIC BLOOD PRESSURE: 62 MMHG | SYSTOLIC BLOOD PRESSURE: 108 MMHG

## 2024-02-12 PROCEDURE — 20600 DRAIN/INJ JOINT/BURSA W/O US: CPT | Performed by: STUDENT IN AN ORGANIZED HEALTH CARE EDUCATION/TRAINING PROGRAM

## 2024-02-12 NOTE — PROGRESS NOTES
Per Dr. Sanders to draw up .5ml of dexamethasone sodium phosphate. .5ml Kenalog. 10 and 1ml marcaine 0.5% for a left foot injection.

## 2024-02-13 ENCOUNTER — OFFICE VISIT (OUTPATIENT)
Dept: INTERNAL MEDICINE CLINIC | Facility: CLINIC | Age: 84
End: 2024-02-13
Payer: MEDICARE

## 2024-02-13 VITALS
HEIGHT: 63 IN | OXYGEN SATURATION: 98 % | SYSTOLIC BLOOD PRESSURE: 120 MMHG | DIASTOLIC BLOOD PRESSURE: 70 MMHG | BODY MASS INDEX: 24.8 KG/M2 | WEIGHT: 140 LBS | RESPIRATION RATE: 20 BRPM | HEART RATE: 85 BPM | TEMPERATURE: 97 F

## 2024-02-13 DIAGNOSIS — E78.5 DYSLIPIDEMIA: ICD-10-CM

## 2024-02-13 DIAGNOSIS — E55.9 VITAMIN D DEFICIENCY: ICD-10-CM

## 2024-02-13 DIAGNOSIS — M79.672 LEFT FOOT PAIN: ICD-10-CM

## 2024-02-13 DIAGNOSIS — R41.3 MEMORY CHANGE: Primary | ICD-10-CM

## 2024-02-13 DIAGNOSIS — F41.9 ANXIETY: ICD-10-CM

## 2024-02-13 DIAGNOSIS — I65.23 BILATERAL CAROTID ARTERY STENOSIS: ICD-10-CM

## 2024-02-13 DIAGNOSIS — E53.8 VITAMIN B 12 DEFICIENCY: ICD-10-CM

## 2024-02-13 PROCEDURE — 99214 OFFICE O/P EST MOD 30 MIN: CPT | Performed by: NURSE PRACTITIONER

## 2024-02-13 NOTE — PROGRESS NOTES
Melva Mckeon is a 83 year old female.    Chief Complaint   Patient presents with    Follow - Up     6mth follow up       HPI:   here for 6 mo follow up   Has been following with Dr Sanders for left foot pain.  Post surgery.   Injection done recently.    Since our last visit, she saw Dr Seymour for memory changes and Dr Smith for left knee OA received cortisone.   Memory issues.  Neuropsych testing completed.   Report reviewed and discussed.  She currently resides in assisted living and is thriving.    Her daughter is here today.      Anxiety  sertraline  doing well.      OAB  vesicare  she is not sure it is helping much.  Drinking lots of water  discussed side effect dry mouth.  She will see if beneift vs needing to drink more water.      B12 and vit d stable   other labs done recently and are stable as well   MRI brain     Patient Active Problem List   Diagnosis    Vitamin D deficiency    Allergic rhinitis    Osteopenia    Vitamin B 12 deficiency    Lumbosacral spondylosis    Lumbar facet arthropathy    Arthritis of sacroiliac joint    Carotid artery disease (HCC)    History of total left knee replacement (TKR)    Primary osteoarthritis of right knee    History of malignant neoplasm of skin    Dyslipidemia    Anxiety    Tendonitis of foot    Microhematuria    Memory change    Left foot pain    OAB (overactive bladder)    Dense breast     Current Outpatient Medications   Medication Sig Dispense Refill    celecoxib 200 MG Oral Cap Take 1 capsule (200 mg total) by mouth daily. 30 capsule 2    LOSARTAN 25 MG Oral Tab TAKE 1 TABLET(25 MG) BY MOUTH DAILY 90 tablet 0    Solifenacin Succinate 10 MG Oral Tab TAKE 1 TABLET(10 MG) BY MOUTH DAILY 90 tablet 0    cyanocobalamin 1000 MCG Oral Tab Take 1 tablet (1,000 mcg total) by mouth daily.      sertraline 50 MG Oral Tab Take 1 tablet (50 mg total) by mouth daily. 90 tablet 2    UREA 20 INTENSIVE HYDRATING 20 % External Cream MIX WITH TRIAMCINOLONE AND APPLY TOPICALLY TO THE  AFFECTED AREA EVERY DAY AS DIRECTED.      diclofenac 1 % External Gel Apply 2 g topically 2 (two) times daily as needed. 150 g 1    ibuprofen 200 MG Oral Tab Take 1 tablet (200 mg total) by mouth every 8 (eight) hours as needed for Pain.  0    acetaminophen 500 MG Oral Tab Take 1 tablet (500 mg total) by mouth 3 (three) times daily as needed for Pain.  0    triamcinolone acetonide 0.1 % External Ointment         Past Medical History:   Diagnosis Date    Acute sinusitis, unspecified     Arthritis 2019    Bulging discs     Calculus of kidney 1960    Encounter for screening colonoscopy 04/08/2015    Dr.Gonzalo English     Headache(784.0)     Herpes zoster without mention of complication     Lumbago     Myalgia and myositis, unspecified     Personal history of other malignant neoplasm of skin 03/23/2016    Hx BCC x2    Rash and other nonspecific skin eruption     Toxoplasmosis 01/01/1982    Unspecified vitamin D deficiency       Social History:  Social History     Socioeconomic History    Marital status:    Tobacco Use    Smoking status: Never    Smokeless tobacco: Never   Vaping Use    Vaping Use: Never used   Substance and Sexual Activity    Alcohol use: Yes     Comment: Glass of wine daily    Drug use: No    Sexual activity: Yes     Partners: Male     Comment:    Other Topics Concern    Caffeine Concern Yes    Stress Concern No    Weight Concern No    Special Diet No    Exercise No    Seat Belt Yes     Family History   Problem Relation Age of Onset    Arthritis Mother     Breast Cancer Mother 89    Other (Other) Mother     Cancer Mother     Other (cerebral artery aneurysm) Father     Other (diabetes mellitus) Father     Other (leukemia) Father     Cancer Father     Diabetes Father     Breast Cancer Paternal Aunt         Allergies  Allergies   Allergen Reactions    Calcium Acetate OTHER (SEE COMMENTS)     Causes Kidney stones       REVIEW OF SYSTEMS:   GENERAL HEALTH: feels well otherwise  RESPIRATORY:  denies shortness of breath with exertion, no cough  CARDIOVASCULAR: denies chest pain on exertion, no palpatations  GI: denies abdominal pain and denies heartburn, no diarrhea or constipation    EXAM:   /70   Pulse 85   Temp 97.1 °F (36.2 °C) (Temporal)   Resp 20   Ht 5' 3\" (1.6 m)   Wt 140 lb (63.5 kg)   LMP  (LMP Unknown)   SpO2 98%   BMI 24.80 kg/m²   GENERAL: well developed, well nourished,in no apparent distress  LUNGS: normal rate without respiratory distress, lungs clear to auscultation  CARDIO: RRR   GI: normal bowel sounds, no masses, HSM or tenderness  EXTREMITIES: no edema, normal strength and tone  compression   PSYCH: alert and oriented x 3    ASSESSMENT AND PLAN:     Encounter Diagnoses   Name Primary?    Memory change  reviewed neuropsych testing.  Will f/u w neuro as well.   Yes    Anxiety  sertraline.      Dyslipidemia   stable  monitor.      Bilateral carotid artery stenosis     Vitamin B 12 deficiency  cont supplement.      Vitamin D deficiency  cont supplement       Left foot pain  Dr Sanders.     Code selection for this visit was based on time spent (>30min) on date of service in preparing to see the patient, obtaining and/or reviewing separately obtained history, performing a medically appropriate examination, counseling and educating the patient/family/caregiver, ordering medications or testing, referring and communicating with other healthcare providers, documenting clinical information in the EHR, independently interpreting results and communicating results to the patient/family/caregiver and care coordination with the patient's other providers.      No orders of the defined types were placed in this encounter.      Meds & Refills for this Visit:  Requested Prescriptions      No prescriptions requested or ordered in this encounter       Imaging & Consults:  None    No follow-ups on file.  There are no Patient Instructions on file for this visit.

## 2024-02-15 DIAGNOSIS — N32.81 OAB (OVERACTIVE BLADDER): ICD-10-CM

## 2024-02-15 RX ORDER — SOLIFENACIN SUCCINATE 10 MG/1
10 TABLET, FILM COATED ORAL DAILY
Qty: 90 TABLET | Refills: 0 | Status: SHIPPED | OUTPATIENT
Start: 2024-02-15

## 2024-02-15 NOTE — TELEPHONE ENCOUNTER
LOV: 2/13/24  Next OV: none stated  Last Refill:11/21/23  Medication Quantity Refills Start End   Solifenacin Succinate 10 MG Oral Tab 90 tablet 0 11/21/2023 --   Sig:   TAKE 1 TABLET(10 MG) BY MOUTH DAILY         Please authorize if acceptable.   Thank you!

## 2024-02-16 RX ORDER — SOLIFENACIN SUCCINATE 10 MG/1
10 TABLET, FILM COATED ORAL DAILY
Qty: 90 TABLET | Refills: 0 | OUTPATIENT
Start: 2024-02-16

## 2024-03-20 DIAGNOSIS — E78.5 DYSLIPIDEMIA: ICD-10-CM

## 2024-03-20 RX ORDER — LOSARTAN POTASSIUM 25 MG/1
25 TABLET ORAL DAILY
Qty: 90 TABLET | Refills: 0 | Status: SHIPPED | OUTPATIENT
Start: 2024-03-20

## 2024-03-21 RX ORDER — LOSARTAN POTASSIUM 25 MG/1
25 TABLET ORAL DAILY
Qty: 90 TABLET | Refills: 0 | OUTPATIENT
Start: 2024-03-21

## 2024-03-27 ENCOUNTER — TELEPHONE (OUTPATIENT)
Dept: PODIATRY CLINIC | Facility: CLINIC | Age: 84
End: 2024-03-27

## 2024-03-27 NOTE — TELEPHONE ENCOUNTER
Requested Prescriptions     Pending Prescriptions Disp Refills    SERTRALINE 50 MG Oral Tab [Pharmacy Med Name: SERTRALINE 50MG TABLETS] 90 tablet 0     Sig: TAKE 1 TABLET(50 MG) BY MOUTH DAILY       LOV: 2--sd-anxiety    LAST CPE: 8-2-2023-sd-physical    Last Labs: 7--lipid,cbc,cmp,tsh    Last Refill: 5-9-2023- 90 tabs with 2 refills     Your appointments       Date & Time Appointment Department ([place])    May 10, 2024 11:00 AM CDT Follow Up Visit with Angeles Seymour MD [facility] ([facility])    Contact your primary care provider if your insurance requires a referral.    Please arrive 15 minutes prior to your scheduled appointment. Be sure to bring your current Insurance card, Photo ID, and medication bottles or a list of your current medications.      A 24 hour notice is required to cancel any appointment or you may be charged a $40 No Show Fee.     Important: 24 hour notice is required to cancel any appointment or you may be charged a $40 No Show Fee. Please notify your physician office.         Jun 07, 2024 11:00 AM CDT Follow Up Visit with Freddy Sanders DPM [facility] ([place] [address])              [address]  [facility] [id number] [phone] [facility] [address]  [address] [facility] [address]  [facility] [id number] [phone]

## 2024-04-02 RX ORDER — CELECOXIB 200 MG/1
200 CAPSULE ORAL DAILY
Qty: 30 CAPSULE | Refills: 2 | Status: SHIPPED | OUTPATIENT
Start: 2024-04-02

## 2024-05-10 DIAGNOSIS — N32.81 OAB (OVERACTIVE BLADDER): ICD-10-CM

## 2024-05-13 RX ORDER — SOLIFENACIN SUCCINATE 10 MG/1
10 TABLET, FILM COATED ORAL DAILY
Qty: 90 TABLET | Refills: 3 | Status: SHIPPED | OUTPATIENT
Start: 2024-05-13

## 2024-05-13 NOTE — TELEPHONE ENCOUNTER
REFILL PASSED PER [facility]OLS    Requested Prescriptions   Pending Prescriptions Disp Refills    SOLIFENACIN SUCCINATE 10 MG Oral Tab [Pharmacy Med Name: SOLIFENACIN 10MG TABLETS] 90 tablet 0     Sig: TAKE ONE TABLET(10 MG) BY MOUTH DAILY.       Genitourinary Medications Passed - 5/10/2024  1:07 PM        Passed - Patient does not have pulmonary hypertension on problem list        Passed - In person appointment or virtual visit in the past 12 mos or appointment in next 3 mos     Recent Outpatient Visits              3 months ago [facility], [address] [place], [place] [address]Jessy Alas APRN    Office Visit    3 months ago Arthrosis of midfoot, left    [facility] Freddy Sanders DPM    Office Visit    6 months ago [facility] Angeles Seymour MD    Office Visit    7 months ago Arthrosis of midfoot, left    [facility] Freddy Sanders DPM    Office Visit    9 months ago Encounter for annual health examination    [address]    Office Visit          Future Appointments         Provider Department Appt Notes    In 3 weeks [facility]    In 2 months Angeles Seymour MD [facility] Follow up to NeuroPysch eval                         Future Appointments         Provider Department Appt Notes    In 3 weeks Freddy Sanders DPM [facility] [place] injection and nail trims    In 2 months Angeles Seymour MD [facility] Follow up to NeuroPysch eval          Recent Outpatient Visits              3 months ago Memory change    [place]  [facility], [address] [place] [address] Jessy Myers APRN    Office Visit    3 months ago Arthrosis of midfoot, left    [facility]    Office Visit    6 months ago [facility]    Office Visit    7 months ago Arthrosis of midfoot, left    [facility]    Office Visit    9 months ago Encounter for annual health examination    [facility], [address] [place] [address] Jessy Myers APRN    Office Visit

## 2024-06-07 ENCOUNTER — OFFICE VISIT (OUTPATIENT)
Dept: PODIATRY CLINIC | Facility: CLINIC | Age: 84
End: 2024-06-07
Payer: MEDICARE

## 2024-06-07 VITALS — DIASTOLIC BLOOD PRESSURE: 66 MMHG | SYSTOLIC BLOOD PRESSURE: 124 MMHG

## 2024-06-07 DIAGNOSIS — B35.1 ONYCHOMYCOSIS: ICD-10-CM

## 2024-06-07 DIAGNOSIS — M79.672 FOOT PAIN, LEFT: ICD-10-CM

## 2024-06-07 DIAGNOSIS — L60.8 INCURVATED NAIL: ICD-10-CM

## 2024-06-07 DIAGNOSIS — R26.81 GAIT INSTABILITY: ICD-10-CM

## 2024-06-07 DIAGNOSIS — M19.072 ARTHROSIS OF MIDFOOT, LEFT: Primary | ICD-10-CM

## 2024-06-07 RX ORDER — DEXAMETHASONE SODIUM PHOSPHATE 4 MG/ML
2 VIAL (ML) INJECTION ONCE
Status: COMPLETED | OUTPATIENT
Start: 2024-06-07 | End: 2024-06-07

## 2024-06-07 NOTE — PROGRESS NOTES
Melva Mckeon is a 83 year old female.   Chief Complaint   Patient presents with    Toenail Care     Nail care and foot check- follow up left foot-  pain 3/10.         HPI:     Patient is a pleasant 83-year-old female who presents to clinic with daughter for evaluation of continued left foot pain.  Patient does have history of fourth and fifth TMT J arthroplasty with Dr. Rodriguez.  She relates that last cortisone injection worked well for her for a little while but her pain is starting to return.  She is hoping for another injection at this time.  It has been approximately 4 months since last injection.  She continues to ambulate with supportive shoes and inserts and compression sleeve which are working well for her.  She is interested in more physical therapy.  She also has elongated and thickened nails that she has difficulty trimming at times.  No other complaints are mentioned.     Allergies: Calcium acetate   Current Outpatient Medications   Medication Sig Dispense Refill    Solifenacin Succinate 10 MG Oral Tab Take 1 tablet (10 mg total) by mouth daily. 90 tablet 3    celecoxib 200 MG Oral Cap Take 1 capsule (200 mg total) by mouth daily. 30 capsule 2    sertraline 50 MG Oral Tab Take 1 tablet (50 mg total) by mouth daily. 90 tablet 1    LOSARTAN 25 MG Oral Tab TAKE 1 TABLET(25 MG) BY MOUTH DAILY 90 tablet 0    cyanocobalamin 1000 MCG Oral Tab Take 1 tablet (1,000 mcg total) by mouth daily.      UREA 20 INTENSIVE HYDRATING 20 % External Cream MIX WITH TRIAMCINOLONE AND APPLY TOPICALLY TO THE AFFECTED AREA EVERY DAY AS DIRECTED.      diclofenac 1 % External Gel Apply 2 g topically 2 (two) times daily as needed. 150 g 1    ibuprofen 200 MG Oral Tab Take 1 tablet (200 mg total) by mouth every 8 (eight) hours as needed for Pain.  0    acetaminophen 500 MG Oral Tab Take 1 tablet (500 mg total) by mouth 3 (three) times daily as needed for Pain.  0    triamcinolone acetonide 0.1 % External Ointment         Past  Medical History:    Acute sinusitis, unspecified    Arthritis    Bulging discs    Calculus of kidney    Encounter for screening colonoscopy    Dr.Gonzalo English     Headache(784.0)    Herpes zoster without mention of complication    Lumbago    Myalgia and myositis, unspecified    Personal history of other malignant neoplasm of skin    Hx BCC x2    Rash and other nonspecific skin eruption    Toxoplasmosis    Unspecified vitamin D deficiency      Past Surgical History:   Procedure Laterality Date    Adenoidectomy  1950    Appendectomy      Appendectomy  1950    Cataract      Colonoscopy  05, 2015    Dereck Marina MD, Kyle English    Knee arthroscopy Left 10/21/16--Dr. Smith    partial medial and lateral meniscectomies    Knee replacement surgery      Knee surgery Left 2019    TKR          Other      bladder surgery, removal of calcified lesion    Other surgical history  2011    injection    Other surgical history N/A 1962    Calcium deposit removed from urinary bladder    Other surgical history  10/29/2019    cystoscopy    Skin surgery      BCC - face & R dorsal hand (treated by outside [facility] physician)    Tonsillectomy      w/adenoidectomy    Upper gi endoscopy,biopsy      13 ron       Family History   Problem Relation Age of Onset    Arthritis Mother     Breast Cancer Mother 89    Other (Other) Mother     Cancer Mother     Other (cerebral artery aneurysm) Father     Other (diabetes mellitus) Father     Other (leukemia) Father     Cancer Father     Diabetes Father     Breast Cancer Paternal Aunt       Social History     Socioeconomic History    Marital status:    Tobacco Use    Smoking status: Never    Smokeless tobacco: Never   Vaping Use    Vaping status: Never Used   Substance and Sexual Activity    Alcohol use: Yes     Comment: Glass of wine daily    Drug use: No    Sexual activity: Yes     Partners: Male     Comment:    Other Topics Concern     Caffeine Concern Yes    Stress Concern No    Weight Concern No    Special Diet No    Exercise No    Seat Belt Yes           REVIEW OF SYSTEMS:   Today reviewed systens as documented below  GENERAL HEALTH: feels well otherwise  SKIN: Refer to exam below  RESPIRATORY: denies shortness of breath with exertion  CARDIOVASCULAR: denies chest pain on exertion  GI: denies abdominal pain and denies heartburn  NEURO: denies headaches    EXAM:   /66 (BP Location: Left arm, Patient Position: Sitting, Cuff Size: adult)   LMP  (LMP Unknown)   GENERAL: well developed, well nourished, in no apparent distress  EXTREMITIES:     Derm: Skin is warm and dry.  Well-healed cicatrix to left foot at surgical site.  No ecchymosis or other concerns.  Nails x 10 are elongated and thickened and incurvated.    Vascular: Pedal pulses are palpable.  No edema.  CFT intact to digits.    Neuro: Active and pain sensation intact to digits.    MSK: Pain on palpation noted to fourth and fifth TMT J of left foot at surgical site.  No pain with eversion against resistance.  No pain to peroneal tendons.  Compartments are soft and compressible.  Gait is unstable and antalgic.    ASSESSMENT AND PLAN:   Diagnoses and all orders for this visit:    Arthrosis of midfoot, left  -     Physical Therapy Referral - External  -     dexamethasone (Decadron) 4 MG/ML injection 2 mg  -     triamcinolone acetonide (Kenalog-10) 10 mg/mL injection    Foot pain, left  -     Physical Therapy Referral - External    Gait instability  -     Physical Therapy Referral - External    Onychomycosis    Incurvated nail          Plan:     -Patient examined, chart history reviewed.    -Patient has experienced some improvement in function and pain to her foot since last visit.  She should continue ambulate with supportive sock any tennis shoes and avoid walking barefoot.  Also recommend continuing physical therapy and using compression stocking.  -Patient is interested in  cortisone injection.  Discussed with patient that we can perform up to 3 of these per year at her surgical site.  She would like to receive cortisone injection today.  It has been 4 months since last injection.  Informed consent was obtained.  After prepping site with alcohol, administered cortisone injection to the left fourth and fifth TMT J's consisting of 1 cc of 0.5% Marcaine plain, 0.5 cc of dexamethasone, and 0.5 cc Kenalog 10.  Patient tolerated injection well and there are no complications.  Site was dressed with Band-Aid.  -Patient will continue to focus on support to her foot and physical therapy.  Updated referral placed for physical therapy.  -Sharply debrided nails x 10 with nail nipper.  Nails further smoothed with a Dremel.    RTC 3 to 4 months.    The patient indicates understanding of these issues and agrees to the plan.    Freddy Sanders DPM

## 2024-06-07 NOTE — PROGRESS NOTES
Per Dr Sanders  to draw up .5ml of dexamethasone sodium phosphate, .5ml kenalog-10 and 1ml marcaine 0.5% for a left foot injection.

## 2024-06-10 ENCOUNTER — TELEPHONE (OUTPATIENT)
Dept: PODIATRY CLINIC | Facility: CLINIC | Age: 84
End: 2024-06-10

## 2024-06-10 NOTE — TELEPHONE ENCOUNTER
Per Mikki waiting on 6/7 office note to be signed, asking for call back once complete. Thank you.

## 2024-06-14 PROBLEM — G30.9 ALZHEIMER'S DEMENTIA WITHOUT BEHAVIORAL DISTURBANCE, PSYCHOTIC DISTURBANCE, MOOD DISTURBANCE, OR ANXIETY (HCC): Status: ACTIVE | Noted: 2024-06-14

## 2024-06-14 PROBLEM — F02.80 ALZHEIMER'S DEMENTIA WITHOUT BEHAVIORAL DISTURBANCE, PSYCHOTIC DISTURBANCE, MOOD DISTURBANCE, OR ANXIETY (HCC): Status: ACTIVE | Noted: 2024-06-14

## 2024-06-17 NOTE — TELEPHONE ENCOUNTER
Spoke with Mikki. She indicates that she was able to access the note through Epic. Nothing further needed at this time.

## 2024-06-19 DIAGNOSIS — E78.5 DYSLIPIDEMIA: ICD-10-CM

## 2024-06-21 RX ORDER — LOSARTAN POTASSIUM 25 MG/1
25 TABLET ORAL DAILY
Qty: 90 TABLET | Refills: 3 | Status: SHIPPED | OUTPATIENT
Start: 2024-06-21

## 2024-06-21 NOTE — TELEPHONE ENCOUNTER
Refill Per Protocol     Requested Prescriptions   Pending Prescriptions Disp Refills    LOSARTAN 25 MG Oral Tab [Pharmacy Med Name: LOSARTAN 25MG TABLETS] 90 tablet 0     Sig: TAKE 1 TABLET(25 MG) BY MOUTH DAILY       Hypertension Medications Protocol Passed - 6/19/2024  6:36 PM        Passed - CMP or BMP in past 12 months        Passed - Last BP reading less than 140/90     BP Readings from Last 1 Encounters:   06/14/24 122/62               Passed - In person appointment or virtual visit in the past 12 mos or appointment in next 3 mos     Recent Outpatient Visits              1 week ago Alzheimer's dementia of other onset, without behavioral disturbance, psychotic disturbance, mood disturbance, or anxiety, unspecified dementia severity (HCC)    [facility] Angeles Seymour MD    Office Visit    2 weeks ago Arthrosis of midfoot, left    [facility] Freddy Sanders DPM    Office Visit    4 months ago [address] Jessy Jerry APRN    Office Visit    4 months ago Arthrosis of midfoot, left    [facility] Freddy Sanders DPM    Office Visit    7 months ago [facility] Angeles Seymour MD    Office Visit          Future Appointments         Provider Department Appt Notes    In 3 weeks Donny Oglesby MD [facility], [facility]    In 1 month Jessy Jerry APRN [address] -xshi 8/23    In 2 months Angeles Seymour MD [facility] 3 month follow up    In 3 months Freddy Sanders DPM [facility]                     Passed - EGFRCR or GFRNAA > 50     GFR  Evaluation  EGFRCR: 66 , resulted on 7/26/2023                 Future Appointments         Provider Department Appt Notes    In 3 weeks Donny Oglesby MD [facility], [facility]    In 1 month Jessy Jerry APRN [facility], [address] [place] [address] -psqo 8/23    In 2 months Angeles Seymour MD [facility], [facility] 3 month follow up    In 3 months Freddy Sanders DPM [facility]           Recent Outpatient Visits              1 week ago Alzheimer's dementia of other onset, without behavioral disturbance, psychotic disturbance, mood disturbance, or anxiety, unspecified dementia severity (HCC)    [facility] Angeles Seymour MD    Office Visit    2 weeks ago Arthrosis of midfoot, left    [facility], [facility] Freddy Sanders DPM    Office Visit    4 months ago Memory change    [address] Jessy Jerry APRN    Office Visit    4 months ago Arthrosis of midfoot, left    [facility] Freddy Sanders DPM    Office Visit    7 months ago Memory change    [facility] Angeles Seymour MD    Office Visit

## 2024-06-25 RX ORDER — CELECOXIB 200 MG/1
200 CAPSULE ORAL DAILY
Qty: 30 CAPSULE | Refills: 2 | Status: ON HOLD | OUTPATIENT
Start: 2024-06-25

## 2024-06-25 NOTE — TELEPHONE ENCOUNTER
Celebrex 200mg refill request    Last Rx: 4/2/24, #30, 2 refills  LOV: 6/7/24    Please review and sign if refill appropriate   Patient is "itchy" and requesting medication for this

## 2024-06-30 ENCOUNTER — APPOINTMENT (OUTPATIENT)
Dept: CT IMAGING | Facility: HOSPITAL | Age: 84
End: 2024-06-30
Attending: EMERGENCY MEDICINE
Payer: MEDICARE

## 2024-06-30 ENCOUNTER — HOSPITAL ENCOUNTER (OUTPATIENT)
Facility: HOSPITAL | Age: 84
Setting detail: OBSERVATION
Discharge: HOME | End: 2024-07-03
Attending: EMERGENCY MEDICINE | Admitting: HOSPITALIST
Payer: MEDICARE

## 2024-06-30 DIAGNOSIS — N39.0 URINARY TRACT INFECTION WITHOUT HEMATURIA, SITE UNSPECIFIED: Primary | ICD-10-CM
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
## 2024-06-30 DIAGNOSIS — R11.11 VOMITING WITHOUT NAUSEA, UNSPECIFIED VOMITING TYPE: ICD-10-CM

## 2024-06-30 DIAGNOSIS — M19.072 ARTHROSIS OF MIDFOOT, LEFT: ICD-10-CM

## 2024-06-30 LAB
ALBUMIN SERPL-MCNC: 3.7 G/DL (ref 3.4–5)
ALBUMIN/GLOB SERPL: 1.2 {RATIO} (ref 1–2)
ALP LIVER SERPL-CCNC: 74 U/L
ALT SERPL-CCNC: 22 U/L
ANION GAP SERPL CALC-SCNC: 12 MMOL/L (ref 0–18)
AST SERPL-CCNC: 17 U/L (ref 15–37)
BASOPHILS # BLD AUTO: 0.05 X10(3) UL (ref 0–0.2)
BASOPHILS NFR BLD AUTO: 0.6 %
BILIRUB SERPL-MCNC: 0.4 MG/DL (ref 0.1–2)
BUN BLD-MCNC: 25 MG/DL (ref 9–23)
CALCIUM BLD-MCNC: 9 MG/DL (ref 8.5–10.1)
CHLORIDE SERPL-SCNC: 102 MMOL/L (ref 98–112)
CO2 SERPL-SCNC: 20 MMOL/L (ref 21–32)
CREAT BLD-MCNC: 0.88 MG/DL
EGFRCR SERPLBLD CKD-EPI 2021: 64 ML/MIN/1.73M2 (ref 60–?)
EOSINOPHIL # BLD AUTO: 0.06 X10(3) UL (ref 0–0.7)
EOSINOPHIL NFR BLD AUTO: 0.7 %
ERYTHROCYTE [DISTWIDTH] IN BLOOD BY AUTOMATED COUNT: 14 %
GLOBULIN PLAS-MCNC: 3.2 G/DL (ref 2.8–4.4)
GLUCOSE BLD-MCNC: 94 MG/DL (ref 70–99)
GLUCOSE BLD-MCNC: 99 MG/DL (ref 70–99)
HCT VFR BLD AUTO: 41.6 %
HGB BLD-MCNC: 13.9 G/DL
IMM GRANULOCYTES # BLD AUTO: 0.02 X10(3) UL (ref 0–1)
IMM GRANULOCYTES NFR BLD: 0.2 %
LYMPHOCYTES # BLD AUTO: 3.52 X10(3) UL (ref 1–4)
LYMPHOCYTES NFR BLD AUTO: 40.1 %
MCH RBC QN AUTO: 31.2 PG (ref 26–34)
MCHC RBC AUTO-ENTMCNC: 33.4 G/DL (ref 31–37)
MCV RBC AUTO: 93.3 FL
MONOCYTES # BLD AUTO: 0.38 X10(3) UL (ref 0.1–1)
MONOCYTES NFR BLD AUTO: 4.3 %
NEUTROPHILS # BLD AUTO: 4.75 X10 (3) UL (ref 1.5–7.7)
NEUTROPHILS # BLD AUTO: 4.75 X10(3) UL (ref 1.5–7.7)
NEUTROPHILS NFR BLD AUTO: 54.1 %
OSMOLALITY SERPL CALC.SUM OF ELEC: 282 MOSM/KG (ref 275–295)
PLATELET # BLD AUTO: 206 10(3)UL (ref 150–450)
POTASSIUM SERPL-SCNC: 4.3 MMOL/L (ref 3.5–5.1)
PROT SERPL-MCNC: 6.9 G/DL (ref 6.4–8.2)
RBC # BLD AUTO: 4.46 X10(6)UL
SODIUM SERPL-SCNC: 134 MMOL/L (ref 136–145)
WBC # BLD AUTO: 8.8 X10(3) UL (ref 4–11)

## 2024-06-30 PROCEDURE — 70450 CT HEAD/BRAIN W/O DYE: CPT | Performed by: EMERGENCY MEDICINE

## 2024-07-01 PROBLEM — R11.11 VOMITING WITHOUT NAUSEA, UNSPECIFIED VOMITING TYPE: Status: ACTIVE | Noted: 2024-07-01
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
## 2024-07-01 PROBLEM — E87.1 HYPONATREMIA: Status: ACTIVE | Noted: 2024-07-01

## 2024-07-01 PROBLEM — N39.0 URINARY TRACT INFECTION WITHOUT HEMATURIA, SITE UNSPECIFIED: Status: ACTIVE | Noted: 2024-07-01

## 2024-07-01 PROBLEM — R79.89 AZOTEMIA: Status: ACTIVE | Noted: 2024-07-01

## 2024-07-01 PROBLEM — E87.20 METABOLIC ACIDOSIS: Status: ACTIVE | Noted: 2024-07-01

## 2024-07-01 LAB
BILIRUB UR QL STRIP.AUTO: NEGATIVE
CLARITY UR REFRACT.AUTO: CLEAR
GLUCOSE UR STRIP.AUTO-MCNC: NORMAL MG/DL
LEUKOCYTE ESTERASE UR QL STRIP.AUTO: 75
PH UR STRIP.AUTO: 6 [PH] (ref 5–8)
PROT UR STRIP.AUTO-MCNC: NEGATIVE MG/DL
SARS-COV-2 RNA RESP QL NAA+PROBE: NOT DETECTED
SP GR UR STRIP.AUTO: 1.01 (ref 1–1.03)
UROBILINOGEN UR STRIP.AUTO-MCNC: NORMAL MG/DL

## 2024-07-01 PROCEDURE — 99223 1ST HOSP IP/OBS HIGH 75: CPT | Performed by: HOSPITALIST

## 2024-07-01 RX ORDER — ONDANSETRON 2 MG/ML
4 INJECTION INTRAMUSCULAR; INTRAVENOUS ONCE
Status: COMPLETED | OUTPATIENT
Start: 2024-07-01 | End: 2024-07-01

## 2024-07-01 RX ORDER — SENNOSIDES 8.6 MG
17.2 TABLET ORAL NIGHTLY PRN
Status: DISCONTINUED | OUTPATIENT
Start: 2024-07-01 | End: 2024-07-03

## 2024-07-01 RX ORDER — ONDANSETRON 2 MG/ML
4 INJECTION INTRAMUSCULAR; INTRAVENOUS EVERY 6 HOURS PRN
Status: DISCONTINUED | OUTPATIENT
Start: 2024-07-01 | End: 2024-07-03

## 2024-07-01 RX ORDER — DONEPEZIL HYDROCHLORIDE 5 MG/1
5 TABLET, FILM COATED ORAL NIGHTLY
Status: DISCONTINUED | OUTPATIENT
Start: 2024-07-01 | End: 2024-07-03

## 2024-07-01 RX ORDER — CEPHALEXIN 500 MG/1
500 CAPSULE ORAL ONCE
Status: DISCONTINUED | OUTPATIENT
Start: 2024-07-01 | End: 2024-07-01

## 2024-07-01 RX ORDER — ECHINACEA PURPUREA EXTRACT 125 MG
1 TABLET ORAL
Status: DISCONTINUED | OUTPATIENT
Start: 2024-07-01 | End: 2024-07-03

## 2024-07-01 RX ORDER — ACETAMINOPHEN 500 MG
1000 TABLET ORAL EVERY 4 HOURS PRN
Status: DISCONTINUED | OUTPATIENT
Start: 2024-07-01 | End: 2024-07-03

## 2024-07-01 RX ORDER — MELATONIN
3 NIGHTLY PRN
Status: DISCONTINUED | OUTPATIENT
Start: 2024-07-01 | End: 2024-07-03

## 2024-07-01 RX ORDER — BISACODYL 10 MG
10 SUPPOSITORY, RECTAL RECTAL
Status: DISCONTINUED | OUTPATIENT
Start: 2024-07-01 | End: 2024-07-03

## 2024-07-01 RX ORDER — LOSARTAN POTASSIUM 25 MG/1
25 TABLET ORAL DAILY
Status: DISCONTINUED | OUTPATIENT
Start: 2024-07-01 | End: 2024-07-03

## 2024-07-01 RX ORDER — CELECOXIB 200 MG/1
200 CAPSULE ORAL DAILY
Status: DISCONTINUED | OUTPATIENT
Start: 2024-07-01 | End: 2024-07-03

## 2024-07-01 RX ORDER — ENEMA 19; 7 G/133ML; G/133ML
1 ENEMA RECTAL ONCE AS NEEDED
Status: DISCONTINUED | OUTPATIENT
Start: 2024-07-01 | End: 2024-07-03

## 2024-07-01 RX ORDER — POLYETHYLENE GLYCOL 3350 17 G/17G
17 POWDER, FOR SOLUTION ORAL DAILY PRN
Status: DISCONTINUED | OUTPATIENT
Start: 2024-07-01 | End: 2024-07-03

## 2024-07-01 RX ORDER — MELATONIN
1000 DAILY
Status: DISCONTINUED | OUTPATIENT
Start: 2024-07-01 | End: 2024-07-03

## 2024-07-01 RX ORDER — METOCLOPRAMIDE HYDROCHLORIDE 5 MG/ML
5 INJECTION INTRAMUSCULAR; INTRAVENOUS EVERY 8 HOURS PRN
Status: DISCONTINUED | OUTPATIENT
Start: 2024-07-01 | End: 2024-07-03

## 2024-07-01 RX ORDER — HEPARIN SODIUM 5000 [USP'U]/ML
5000 INJECTION, SOLUTION INTRAVENOUS; SUBCUTANEOUS EVERY 12 HOURS SCHEDULED
Status: DISCONTINUED | OUTPATIENT
Start: 2024-07-01 | End: 2024-07-03

## 2024-07-01 NOTE — PHYSICAL THERAPY NOTE
PHYSICAL THERAPY EVALUATION - INPATIENT     Room Number: 414/414-A  Evaluation Date: 7/1/2024  Type of Evaluation: Initial  Physician Order: PT Eval and Treat    Presenting Problem: fall  Co-Morbidities : OA, anxiety, dementia, CAD  Reason for Therapy: Mobility Dysfunction and Discharge Planning    PHYSICAL THERAPY ASSESSMENT   Patient is a 83 year old female admitted 6/30/2024 for a fall.   Patient is currently functioning at baseline with bed mobility, transfers, and gait. Prior to admission, patient's baseline is mod I using RW and assistance with some ADLs.     PLAN  Patient has been evaluated and presents with no skilled Physical Therapy needs at this time.  Patient discharged from Physical Therapy services.  Please re-order if a new functional limitation presents during this admission.    GOALS  Patient was able to achieve the following goals ...    Patient was able to transfer At previous, functional level   Patient able to ambulate on level surfaces At previous, functional level         HOME SITUATION  Type of Home: Independent living facility ([facility])   Home Layout: One level                Lives With: Alone;Caregiver part-time  Drives: No  Patient Owned Equipment: Rolling walker  Patient Regularly Uses: Hearing aides    Prior Level of Rogers: Pt reports that she is mod I with all mobility using a RW. Pt reports that she gets some assistance when using the bathroom for help getting up off the toilet and to supervise when showering. Pt also reports that she walks to dining room and gets all meals there as well.    SUBJECTIVE  \"My elbow bruise is huge from my fall.\"      OBJECTIVE  Precautions: Bed/chair alarm  Fall Risk: High fall risk    WEIGHT BEARING RESTRICTION  Weight Bearing Restriction: None                PAIN ASSESSMENT  Rating: Unable to rate  Location: R elbow  Management Techniques: Activity promotion;Body mechanics;Repositioning    COGNITION  Following Commands:  follows  all commands and directions without difficulty    RANGE OF MOTION AND STRENGTH ASSESSMENT  Upper extremity ROM and strength are within functional limits     Lower extremity ROM is within functional limits     Lower extremity strength is within functional limits       BALANCE  Static Sitting: Good  Dynamic Sitting: Good  Static Standing: Fair -  Dynamic Standing: Fair -    ADDITIONAL TESTS                                    ACTIVITY TOLERANCE                         O2 WALK       NEUROLOGICAL FINDINGS                        AM-PAC '6-Clicks' INPATIENT SHORT FORM - BASIC MOBILITY  How much difficulty does the patient currently have...  Patient Difficulty: Turning over in bed (including adjusting bedclothes, sheets and blankets)?: None   Patient Difficulty: Sitting down on and standing up from a chair with arms (e.g., wheelchair, bedside commode, etc.): None   Patient Difficulty: Moving from lying on back to sitting on the side of the bed?: None   How much help from another person does the patient currently need...   Help from Another: Moving to and from a bed to a chair (including a wheelchair)?: None   Help from Another: Need to walk in hospital room?: A Little   Help from Another: Climbing 3-5 steps with a railing?: A Little       AM-PAC Score:  Raw Score: 22   Approx Degree of Impairment: 20.91%   Standardized Score (AM-PAC Scale): 53.28   CMS Modifier (G-Code): CJ    FUNCTIONAL ABILITY STATUS  Gait Assessment   Functional Mobility/Gait Assessment  Gait Assistance: Supervision  Distance (ft): 150  Assistive Device: Rolling walker  Pattern: Within Functional Limits    Skilled Therapy Provided     Bed Mobility:  Rolling: NT  Supine to sit: supervision   Sit to supine: NT     Transfer Mobility:  Sit to stand: supervision   Stand to sit: supervision  Gait = see assessment above.    Therapist's comments:Pt was received sitting in bed and was agreeable to PT. Pt denies any pain or dizziness while sitting EOB but does  complain of elbow pain where it was bruised. She complains that it only hurts when something touches but doesn't hurt to move or put weight through. Pt amb 150 ft with RW and supervision and demonstrates no LOB or feels SOB. Pt encouraged to walk with nursing staff throughout the day and stay in chair as long as she can tolerate.    Exercise/Education Provided:  Bed mobility  Body mechanics  Energy conservation  Functional activity tolerated  Gait training  Transfer training    Patient End of Session: Up in chair;Needs met;Call light within reach;All patient questions and concerns addressed;Alarm set    Patient Evaluation Complexity Level:  History Moderate - 1 or 2 personal factors and/or co-morbidities   Examination of body systems Low - addressing 1-2 elements   Clinical Presentation Low - Stable   Clinical Decision Making Low Complexity       PT Session Time: 20 minutes    Therapeutic Activity: 10 minutes

## 2024-07-01 NOTE — ED INITIAL ASSESSMENT (HPI)
83YF c/c of trauma Pt was found laying on the floor of her apt Pt had vomit next to her pt is A&Ox3 out of 4 upon arrival. Pt denies any injury

## 2024-07-01 NOTE — PLAN OF CARE
Patient is alert and oriented x 3-4. Uses call light appropriately. No s/s of distress noted. Patient denies pain. VSS. Ambulates using rolling walker for transfers. Continent/incontinent of bladder. Continent of bowel. On IV Cefazolin for UTI. Patient has urinary frequency. High fall risk d/t hx of falls. Safety precautions in place. Call light within reach.    Problem: GENITOURINARY - ADULT  Goal: Absence of urinary retention  Description: INTERVENTIONS:  - Assess patient’s ability to void and empty bladder  - Monitor intake/output and perform bladder scan as needed  - Follow urinary retention protocol/standard of care  - Consider collaborating with pharmacy to review patient's medication profile  - Implement strategies to promote bladder emptying  Outcome: Progressing     Problem: Impaired Functional Mobility  Goal: Achieve highest/safest level of mobility/gait  Description: Interventions:  - Assess patient's functional ability and stability  - Promote increasing activity/tolerance for mobility and gait  - Educate and engage patient/family in tolerated activity level and precautions  Outcome: Progressing     Problem: Impaired Activities of Daily Living  Goal: Achieve highest/safest level of independence in self care  Description: Interventions:  - Assess ability and encourage patient to participate in ADLs to maximize function  - Promote sitting position while performing ADLs such as feeding, grooming, and bathing  - Educate and encourage patient/family in tolerated functional activity level and precautions during self-care  Outcome: Progressing

## 2024-07-01 NOTE — ED PROVIDER NOTES
Patient Seen in: [facility] Emergency Department      History     Chief Complaint   Patient presents with    Trauma 1 & 2     Stated Complaint: TRAUMA 2    Subjective:   HPI    Patient is an 83-year-old female presents emergency room for evaluation of a fall.  Patient resides at [facility] independent living.  Patient apparently fell in her residence today.  She thinks she may have fallen out of bed but is not sure.  She does not know how or why she fell.  Her daughter is at the bedside and daughter was concerned because patient did not recognize her when she walked in the room but when asked patient who was standing at the bedside, she states her daughter gives me her name.  Patient uses a walker.  She denies head or neck pain.  No chest or abdominal pain.  No pelvic or extremity pain.  Patient denies fever, cough, vomiting or diarrhea although patient did have emesis found next to her when EMS arrived.  Patient denies history of recent falls    Objective:   Past Medical History:    Acute sinusitis, unspecified    Arthritis    Bulging discs    Calculus of kidney    Encounter for screening colonoscopy    Dr.Gonzalo English     Headache(784.0)    Herpes zoster without mention of complication    Lumbago    Myalgia and myositis, unspecified    Personal history of other malignant neoplasm of skin    Hx BCC x2    Rash and other nonspecific skin eruption    Toxoplasmosis    Unspecified vitamin D deficiency              Past Surgical History:   Procedure Laterality Date    Adenoidectomy  1950    Appendectomy      Appendectomy  1950    Cataract  2020    Colonoscopy  05, 2015    Dereck Marina MD, Kyle English    Knee arthroscopy Left 10/21/16--Dr. Smith    partial medial and lateral meniscectomies    Knee replacement surgery      Knee surgery Left 2019    TKR      1961    Other      bladder surgery, removal of calcified lesion    Other surgical history  2011    injection     Other surgical history N/A 08/01/1962    Calcium deposit removed from urinary bladder    Other surgical history  10/29/2019    cystoscopy    Skin surgery      BCC - face & R dorsal hand (treated by outside [facility] physician)    Tonsillectomy      w/adenoidectomy    Upper gi endoscopy,biopsy      12-30-13 pandolfi                 No pertinent social history.            Review of Systems    Positive for stated Chief Complaint: Trauma 1 & 2    Other systems are as noted in HPI.  Constitutional and vital signs reviewed.      All other systems reviewed and negative except as noted above.    Physical Exam     ED Triage Vitals [06/30/24 2239]   /70   Pulse 84   Resp 20   Temp 96.8 °F (36 °C)   Temp src Temporal   SpO2 98 %   O2 Device None (Room air)       Current Vitals:   Vital Signs  BP: 145/54  Pulse: 79  Resp: 17  Temp: 97.8 °F (36.6 °C)  Temp src: Oral  MAP (mmHg): 77    Oxygen Therapy  SpO2: 94 %  O2 Device: None (Room air)  Pulse Oximetry Type: Intermittent            Physical Exam    GENERAL: No apparent distress, nontoxic, well-appearing.  HEENT: Normocephalic, atraumatic.  Moist mucous membranes.  Pupils equal round reactive to light accommodation, extraocular motion is intact, sclerae white, conjunctiva is pink.  Oropharynx is unremarkable, no exudate, dentition intact, no facial bone tenderness or septal hematomas, TMs clear bilaterally  NECK: Supple, trachea midline, no lymphadenopathy, full ROM cervical spine without any pain, no pain on axial loading.      No midline cervical spine tenderness  LUNG: Lungs clear to auscultation bilaterally, no wheezing, no rales, no rhonchi.  CARDIOVASCULAR: Regular rate and rhythm, normal S1-S2, no S3-S4 or murmur  CHEST: No tenderness, no crepitus, no ecchymosis, no abrasions  BACK: No midline lumbar or thoracic tenderness, no evidence for ecchymosis or abrasions, no CVA tenderness  PELVIS: Pelvis is stable to compression.  ABDOMEN: Bowel sounds are present, soft,  nondistended, no pulsatile masses, nontender  MUSCULOSKELETAL: No calf tenderness, no clubbing, no cyanosis, or edema.  Dorsalis pedis and posterior tibial pulses 2+.  No long bone tenderness or deformities noted  SKIN EXAMINATION: Patient has a bruise to the proximal right forearm but no tenderness to the right elbow  NEUROLOGICAL:  alert and oriented X 2.  Oriented to person and place but does not know the year, motor 5/5 all groups, normal sensation, speech is intact.    ED Course     Labs Reviewed   COMP METABOLIC PANEL (14) - Abnormal; Notable for the following components:       Result Value    Sodium 134 (*)     CO2 20.0 (*)     BUN 25 (*)     All other components within normal limits   URINALYSIS, ROUTINE - Abnormal; Notable for the following components:    Ketones Urine Trace (*)     Blood Urine Trace (*)     Nitrite Urine 2+ (*)     Leukocyte Esterase Urine 75 (*)     WBC Urine 11-20 (*)     RBC Urine 3-5 (*)     Bacteria Urine Rare (*)     All other components within normal limits   POCT GLUCOSE - Normal   RAPID SARS-COV-2 BY PCR - Normal   CBC WITH DIFFERENTIAL WITH PLATELET    Narrative:     The following orders were created for panel order CBC With Differential With Platelet.  Procedure                               Abnormality         Status                     ---------                               -----------         ------                     CBC W/ DIFFERENTIAL[[id number]]                              Final result                 Please view results for these tests on the individual orders.   RAINBOW DRAW LAVENDER   RAINBOW DRAW LIGHT GREEN   RAINBOW DRAW BLUE   CBC W/ DIFFERENTIAL     EKG    Rate, intervals and axes as noted on EKG Report.  Rate: 75  Rhythm: Sinus Rhythm  Reading: No acute changes                 I personally reviewd CT images of head and independent interpretation shows no acute bleed.  I also viewed formal radiology report as read by radiology with findings below:  CT BRAIN OR  HEAD (15239)    Result Date: 6/30/2024            PROCEDURE:  CT BRAIN OR HEAD (18823)  COMPARISON:  None.  INDICATIONS:  TRAUMA 2  TECHNIQUE:  Noncontrast CT scanning is performed through the brain. Dose reduction techniques were used. Dose information is transmitted to the ACR (American College of Radiology) NRDR (National Radiology Data Registry) which includes the Dose Index Registry.  PATIENT STATED HISTORY: (As transcribed by Technologist)  Patient fell    FINDINGS: No evidence of intracranial hemorrhage or extra-axial fluid collection. Lucencies in the deep periventricular white matter are likely sequelae of chronic small vessel ischemic disease. Prominence of the sulci.  No mass effect. Visualized portions of paranasal sinuses are unremarkable. Visualized portions of the mastoid air cells are unremarkable. Visualized portions of the orbits are unremarkable. IMPRESSION: Sequelae of chronic small vessel ischemic disease is noted. No evidence of intracranial hemorrhage or extra-axial fluid collection.    LOCATION:  [facility]   Dictated by (CST): Urbano Yuan MD on 6/30/2024 at 11:11 PM     Finalized by (CST): Urbano Yuan MD on 6/30/2024 at 11:12 PM          Medications   cephalexin (Keflex) cap 500 mg (500 mg Oral Not Given 7/1/24 0119)   sodium chloride 0.9 % IV bolus 1,000 mL (0 mL Intravenous Stopped 7/1/24 0058)   ondansetron (Zofran) 4 MG/2ML injection 4 mg (4 mg Intravenous Given 7/1/24 0149)   ceFAZolin (Ancef) 1 g in dextrose 5% 100mL IVPB-ADD (0 g Intravenous Stopped 7/1/24 0219)              MDM      Patient is a 83-year-old female presents to ED for unwitnessed fall.  Circumstances surrounding fall are uncertain.  Potential fall out of bed.  Patient declining syncope.  Differential includes intracranial bleed, electrolyte abnormality, urinary tract infection, cardiac arrhythmia.  EKG obtained that was negative for acute changes or arrhythmia.  Laboratory test showed sodium 134 and  bicarbonate of 20.  Potential for mild dehydration IV fluids were given 1 L normal saline.  Urinalysis is positive for infection.  Went back to evaluate patient and she was nauseous and then vomited prior to the Keflex that was ordered.  Patient daughter concerned about patient being discharged back to independent living facility so decision was made to admit patient to hospital for observation.  IV Ancef ordered.  Case was discussed with hospitalist.        Admission disposition: 7/1/2024  3:04 AM                                        Medical Decision Making      Disposition and Plan     Clinical Impression:  1. Urinary tract infection without hematuria, site unspecified    2. Fall, initial encounter    3. Vomiting without nausea, unspecified vomiting type    4.  Hyponatremia  5.  Dehydration    Disposition:  Admit  7/1/2024  3:04 am    Follow-up:  No follow-up provider specified.        Medications Prescribed:  Current Discharge Medication List                            Hospital Problems       Present on Admission  Date Reviewed: 6/14/2024            ICD-10-CM Noted POA    * (Principal) Urinary tract infection without hematuria, site unspecified N39.0 7/1/2024 Unknown    Azotemia R79.89 7/1/2024 Yes    Fall, initial encounter W19.XXXA 7/1/2024 Unknown    Hyponatremia E87.1 7/1/2024 Yes    Metabolic acidosis E87.20 7/1/2024 Yes    Vomiting without nausea, unspecified vomiting type R11.11 7/1/2024 Unknown

## 2024-07-01 NOTE — H&P
[facility]  [facility]     Melva Mckeon Patient Status:  Observation    1940 MRN [id number]   Location [facility] 4NW-A Attending Donald Liu MD   Hosp Day # 0 PCP Jose Alfredo Bartholomew MD     Chief Complaint:   Chief Complaint   Patient presents with    Trauma 1 & 2       Subjective:    History of Present Illness:     Melva Mckeon is a 83 year old female with a past medical history of arthritis, hypertension, dementia.  The patient was found laying in the floor of her apartment.  She does not recall the events that led up to this.  Vomit next to her and was brought to the emergency room.  In the emergency room she was noted to have mild hyponatremia and abnormal urinalysis.    History/Other:    Past Medical History:  Past Medical History:    Acute sinusitis, unspecified    Arthritis    Bulging discs    Calculus of kidney    Encounter for screening colonoscopy    Dr.Gonzalo English     Headache(784.0)    Herpes zoster without mention of complication    Lumbago    Myalgia and myositis, unspecified    Personal history of other malignant neoplasm of skin    Hx BCC x2    Rash and other nonspecific skin eruption    Toxoplasmosis    Unspecified vitamin D deficiency     Past Surgical History:   Past Surgical History:   Procedure Laterality Date    Adenoidectomy  1950    Appendectomy      Appendectomy  1950    Cataract  2020    Colonoscopy  05, 2015    Dereck Marina MD, Kyle English    Knee arthroscopy Left 10/21/16--Dr. Smith    partial medial and lateral meniscectomies    Knee replacement surgery      Knee surgery Left 2019    TKR          Other      bladder surgery, removal of calcified lesion    Other surgical history  2011    injection    Other surgical history N/A 1962    Calcium deposit removed from urinary bladder    Other surgical history  10/29/2019    cystoscopy    Skin surgery      BCC - face & R dorsal hand (treated by outside [facility]  physician)    Tonsillectomy      w/adenoidectomy    Upper gi endoscopy,biopsy      12-30-13 rheaolbrenda       Family History:   Family History   Problem Relation Age of Onset    Arthritis Mother     Breast Cancer Mother 89    Other (Other) Mother     Cancer Mother     Other (cerebral artery aneurysm) Father     Other (diabetes mellitus) Father     Other (leukemia) Father     Cancer Father     Diabetes Father     Breast Cancer Paternal Aunt      Social History:    reports that she has never smoked. She has never used smokeless tobacco. She reports current alcohol use. She reports that she does not use drugs.     Allergies:   Allergies   Allergen Reactions    Calcium Acetate OTHER (SEE COMMENTS)     Causes Kidney stones       Medications:    No current facility-administered medications on file prior to encounter.     Current Outpatient Medications on File Prior to Encounter   Medication Sig Dispense Refill    CELECOXIB 200 MG Oral Cap TAKE ONE CAPSULE BY MOUTH DAILY. 30 capsule 2    losartan 25 MG Oral Tab Take 1 tablet (25 mg total) by mouth daily. 90 tablet 3    donepezil 5 MG Oral Tab Take 1 tablet (5 mg total) by mouth nightly. 30 tablet 3    Solifenacin Succinate 10 MG Oral Tab Take 1 tablet (10 mg total) by mouth daily. 90 tablet 3    sertraline 50 MG Oral Tab Take 1 tablet (50 mg total) by mouth daily. 90 tablet 1    cyanocobalamin 1000 MCG Oral Tab Take 1 tablet (1,000 mcg total) by mouth daily.      ibuprofen 200 MG Oral Tab Take 1 tablet (200 mg total) by mouth every 8 (eight) hours as needed for Pain.  0    acetaminophen 500 MG Oral Tab Take 1 tablet (500 mg total) by mouth 3 (three) times daily as needed for Pain.  0    UREA 20 INTENSIVE HYDRATING 20 % External Cream MIX WITH TRIAMCINOLONE AND APPLY TOPICALLY TO THE AFFECTED AREA EVERY DAY AS DIRECTED.      triamcinolone acetonide 0.1 % External Ointment          Review of Systems:   A comprehensive review of systems was completed.    Pertinent positives and  negatives noted in the HPI.    Objective:   Physical Exam:    /54 (BP Location: Right arm)   Pulse 79   Temp 97.8 °F (36.6 °C) (Oral)   Resp 17   Wt 138 lb 10.7 oz (62.9 kg)   LMP  (LMP Unknown)   SpO2 94%   BMI 24.56 kg/m²   General: No acute distress, Alert  Respiratory: No rhonchi, no wheezes  Cardiovascular: S1, S2.   Abdomen: Soft, Non-tender, Non-distended, Positive bowel sounds  Neuro: No new focal deficits  Extremities: No edema      Results:    Labs:      Labs Last 24 Hours:  Recent Labs   Lab 06/30/24  2244   WBC 8.8   HGB 13.9   MCV 93.3   .0       Recent Labs   Lab 06/30/24  2244   GLU 99   BUN 25*   CREATSERUM 0.88   CA 9.0   ALB 3.7   *   K 4.3      CO2 20.0*   ALKPHO 74   AST 17   ALT 22   BILT 0.4   TP 6.9       Estimated Creatinine Clearance: 38.7 mL/min (based on SCr of 0.88 mg/dL).    No results for input(s): \"TROP\", \"TROPHS\", \"CK\" in the last 168 hours.    No results for input(s): \"PTP\", \"INR\" in the last 168 hours.    No results for input(s): \"TROP\", \"CK\" in the last 168 hours.      Imaging: Imaging data reviewed in Epic.    Assessment & Plan:      #Fall  Likely mechanical from cystitis and volume conraction  PT eval  Head CT unremarkable for acute pathology    #Abnl U/A; ? cystitis  Ancef  UCX pending  Holding incontinence meds for now    #Dehydration  IVF bolus given in ED    #HTN  Losartan    #Arthritis  Cont.  Home meds    #N/V  Possibly due to volume contraction  Improving  No other assoc symptoms      All diagnosis' and recommendations discussed with patient and/or family in detail.      Plan of care discussed with ED physician      Donald Liu MD    Supplementary Documentation:     The 21st Century Cures Act makes medical notes like these available to patients in the interest of transparency. Please be advised this is a medical document. Medical documents are intended to carry relevant information, facts as evident, and the clinical opinion of the  practitioner. The medical note is intended as peer to peer communication and may appear blunt or direct. It is written in medical language and may contain abbreviations or verbiage that are unfamiliar.

## 2024-07-01 NOTE — PROGRESS NOTES
Pt admitted from independent living and had fallen. Pt has large bruise to right elbow. Pt's daughter at bedside during the admission. Pt has bed alarm on at all times and call light in reach.

## 2024-07-01 NOTE — ED QUICK NOTES
Orders for admission, patient is aware of plan and ready to go upstairs. Any questions, please call ED RN kami at extension [phone].     Patient Covid vaccination status: Fully vaccinated     COVID Test Ordered in ED: Rapid SARS-CoV-2 by PCR    COVID Suspicion at Admission: N/A    Running Infusions:      Mental Status/LOC at time of transport: x2    Other pertinent information:   CIWA score: N/A   NIH score:  N/A

## 2024-07-02 LAB
ANION GAP SERPL CALC-SCNC: 5 MMOL/L (ref 0–18)
ATRIAL RATE: 75 BPM
BUN BLD-MCNC: 16 MG/DL (ref 9–23)
CALCIUM BLD-MCNC: 8.9 MG/DL (ref 8.5–10.1)
CHLORIDE SERPL-SCNC: 110 MMOL/L (ref 98–112)
CO2 SERPL-SCNC: 27 MMOL/L (ref 21–32)
CREAT BLD-MCNC: 0.8 MG/DL
EGFRCR SERPLBLD CKD-EPI 2021: 72 ML/MIN/1.73M2 (ref 60–?)
GLUCOSE BLD-MCNC: 87 MG/DL (ref 70–99)
OSMOLALITY SERPL CALC.SUM OF ELEC: 295 MOSM/KG (ref 275–295)
P AXIS: 46 DEGREES
P-R INTERVAL: 140 MS
POTASSIUM SERPL-SCNC: 4.4 MMOL/L (ref 3.5–5.1)
Q-T INTERVAL: 410 MS
QRS DURATION: 72 MS
QTC CALCULATION (BEZET): 457 MS
R AXIS: 6 DEGREES
SODIUM SERPL-SCNC: 142 MMOL/L (ref 136–145)
T AXIS: 30 DEGREES
VENTRICULAR RATE: 75 BPM

## 2024-07-02 PROCEDURE — 99232 SBSQ HOSP IP/OBS MODERATE 35: CPT | Performed by: STUDENT IN AN ORGANIZED HEALTH CARE EDUCATION/TRAINING PROGRAM

## 2024-07-02 NOTE — CM/SW NOTE
07/02/24 1500   CM/SW Referral Data   Referral Source Social Work (self-referral)   Reason for Referral Discharge planning   Informant Daughter;EMR;Clinical Staff Member   Patient Info   Patient's Current Mental Status at Time of Assessment Alert   Patient's Home Environment Independent Living   Patient lives with Alone     Sw spoke to daughter today re: eventual dc planning.  Pt is 82 yo female, admitted to [facility] from independent living at [facility] due to UTI.  Daughter report pt mainly independent there.  UTI causing confusion and daughter feels she needs [facility] vs 24/7 caregiver.  Sw explained that pt is in OBS so would not get Medicare coverage for [facility].  Discussed private pay SNF and private pay caregivers.  Caregiver list provided.  [facility] referral sent in aidin and list given of private pay options,  Daughter prefers home with caregiver but worries if she will be able to get 24/7 care with 4th of July holiday weekend approaching. Private pay nursing home would be back up plan to home plan.  Pt is current with [facility]. Resume of care referral sent.    Daughter will reach out to caregiver agencies. She will let sw know choice.  She is aware pt may be ready to dc tomorrow.  Urine cultures still pending.    Sully Kamara LCSW  /Discharge Planner    PASSR completed by .

## 2024-07-02 NOTE — PLAN OF CARE
A&Ox3, VSS and afebrile  Pt c/o Headache - prn tylenol given w/relief.   Meds given as per MAR  IVF: IV ancef every 8 hrs  Safety precautions in place, call light within pt's reach.     Problem: Impaired Cognition  Goal: Patient will exhibit improved attention, thought processing and/or memory  Description: Interventions:  - Allow additional time for processing after asking questions or providing instructions  7/2/2024 0333 by Carolina White, RN  Outcome: Progressing  7/2/2024 0332 by Carolina White, RN  Outcome: Progressing     Problem: RISK FOR INFECTION - ADULT  Goal: Absence of fever/infection during anticipated neutropenic period  Description: INTERVENTIONS  - Monitor WBC  - Administer growth factors as ordered  - Implement neutropenic guidelines  Outcome: Progressing

## 2024-07-02 NOTE — HOME CARE LIAISON
Patient is current with [facility] for PT services. Will need [facility] orders if patient status turns into inpatient. Updated AVS with contact information

## 2024-07-02 NOTE — CM/SW NOTE
Daughter states she feels she will have caregiver lined up to start tomorrow and would like [facility] transport at dc tomorrow if cleared to dc.  She is aware of costs.    Sully Kamara LCSW  /Discharge Planner

## 2024-07-02 NOTE — OCCUPATIONAL THERAPY NOTE
OCCUPATIONAL THERAPY EVALUATION - INPATIENT    Room Number: 414/414-A  Evaluation Date: 7/2/2024     Type of Evaluation: Initial  Presenting Problem: UTI    Physician Order: IP Consult to Occupational Therapy  Reason for Therapy:  ADL/IADL Dysfunction and Discharge Planning      OCCUPATIONAL THERAPY ASSESSMENT   Patient is a 83 year old female admitted on 6/30/2024 with Presenting Problem: UTI. Co-Morbidities : OA, anxiety, dementia, CAD  Patient is currently functioning at baseline with toileting, upper body dressing, lower body dressing, grooming, bed mobility, transfers, stating sitting balance, dynamic sitting balance, static standing balance, dynamic standing balance, maintaining seated position, functional standing tolerance, and energy conservation strategies.  Prior to admission, patient's baseline is Mod I.  Patient met all OT goals at Mod I level.  Patient reports no further questions/concerns at this time.         WEIGHT BEARING RESTRICTION  Weight Bearing Restriction: None                Recommendations for nursing staff:   Transfers: Mod I  Toileting location: Toilet    EVALUATION SESSION:  Patient at start of session: supine in bed for session  FUNCTIONAL TRANSFER ASSESSMENT  Sit to Stand: Edge of Bed  Edge of Bed: Modified Independent  Toilet Transfer: Modified Independent    BED MOBILITY  Rolling: Modified Independent  Supine to Sit : Modified Independent  Scooting: Mod I to EOB    BALANCE ASSESSMENT  Static Sitting: Modified Independent  Sitting Bilateral: Modified Independent  Static Standing: Modified Independent  Standing Bilateral: Modified Independent    FUNCTIONAL ADL ASSESSMENT  Grooming Standing: Modified Independent (at sink)  LB Dressing Standing: Modified Independent (to change brief)  Toileting Seated: Modified Independent (at std height toilet)      ACTIVITY TOLERANCE: vitals stable                         O2 SATURATIONS       COGNITION  Overall Cognitive Status:  WFL - within  functional limits  COGNITION ASSESSMENTS       Upper Extremity:   ROM: within functional limits   Strength: is within functional limits   Coordination:  Gross motor: WNL  Fine motor: WNL  Sensation: Light touch:  intact    EDUCATION PROVIDED  Patient: Role of Occupational Therapy; Plan of Care  Patient's Response to Education: Verbalized Understanding; Returned Demonstration    Equipment used: RW  Demonstrates functional use      Patient End of Session: Up in chair;Needs met;Call light within reach;All patient questions and concerns addressed;SCDs in place;Alarm set    OCCUPATIONAL PROFILE    HOME SITUATION  Type of Home: Independent living facility ([facility])  Home Layout: One level  Lives With: Alone;Caregiver part-time    Toilet and Equipment: Standard height toilet  Shower/Tub and Equipment: Walk-in shower  Other Equipment: None    Occupation/Status: retired  Hand Dominance: Right  Drives: No  Patient Regularly Uses: Hearing aides    Prior Level of Function: Pt typically independent with ADLs and mobility. Pt does not use AD.    SUBJECTIVE  Pt stated, \"I am doing well.\"    PAIN ASSESSMENT  Ratin  Location: no pain at this time       OBJECTIVE  Precautions: Bed/chair alarm  Fall Risk: High fall risk    WEIGHT BEARING RESTRICTION  Weight Bearing Restriction: None                AM-PAC ‘6-Clicks’ Inpatient Daily Activity Short Form  -   Putting on and taking off regular lower body clothing?: None  -   Bathing (including washing, rinsing, drying)?: None  -   Toileting, which includes using toilet, bedpan or urinal? : None  -   Putting on and taking off regular upper body clothing?: None  -   Taking care of personal grooming such as brushing teeth?: None  -   Eating meals?: None    AM-PAC Score:  Score: 24  Approx Degree of Impairment: 0%  Standardized Score (AM-PAC Scale): 57.54      ADDITIONAL TESTS     NEUROLOGICAL FINDINGS        PLAN   Patient has been evaluated and presents with no skilled  Occupational Therapy needs at this time.  Patient discharged from Occupational Therapy services.  Please re-order if a new functional limitation presents during this admission.      Patient Evaluation Complexity Level:   Occupational Profile/Medical History LOW - Brief history including review of medical or therapy records    Specific performance deficits impacting engagement in ADL/IADL LOW  1 - 3 performance deficits    Client Assessment/Performance Deficits LOW - No comorbidities nor modifications of tasks    Clinical Decision Making LOW - Analysis of occupational profile, problem-focused assessments, limited treatment options    Overall Complexity LOW     OT Session Time: 25 minutes  Self-Care Home Management: 15 minutes  Therapeutic Activity: 0 minutes  Neuromuscular Re-education: 0 minutes  Therapeutic Exercise: 0 minutes  Cognitive Skills: 0 minutes  Sensory Integrative: 0 minutes  Orthotic Management and Trainin minutes  Can add/delete any of these

## 2024-07-02 NOTE — CM/SW NOTE
PASRR screen completed for potential admission to SNF at DC. Outcome letter uploaded to pt's [facility] referral.    CM/SW will remain available for DC planning and/or support.     PABLO Segovia, CMSRN    [id number]

## 2024-07-02 NOTE — PROGRESS NOTES
[facility]   part of [facility]     Hospitalist Progress Note     Melva Mckeon Patient Status:  Observation    1940 MRN [id number]   [facility] 4NW-A Attending Bere Ascencio,    Hosp Day # 0 PCP Jose Alfredo Bartholomew MD     Chief Complaint: Fall    Subjective:     Patient feels better today and has no complaints    Objective:    Review of Systems:   A comprehensive review of systems was completed; pertinent positive and negatives stated in subjective.    Vital signs:  Temp:  [97.9 °F (36.6 °C)-98.1 °F (36.7 °C)] 97.9 °F (36.6 °C)  Pulse:  [64-68] 68  Resp:  [16] 16  BP: (139-143)/(58-78) 142/67  SpO2:  [93 %-99 %] 96 %    Physical Exam:    General: No acute distress  Respiratory: No wheezes, no rhonchi  Cardiovascular: S1, S2, regular rate and rhythm  Abdomen: Soft, Non-tender, non-distended, positive bowel sounds  Neuro: No new focal deficits.   Extremities: No edema      Diagnostic Data:    Labs:  Recent Labs   Lab 24  2244   WBC 8.8   HGB 13.9   MCV 93.3   .0       Recent Labs   Lab 244 24  0634   GLU 99 87   BUN 25* 16   CREATSERUM 0.88 0.80   CA 9.0 8.9   ALB 3.7  --    * 142   K 4.3 4.4    110   CO2 20.0* 27.0   ALKPHO 74  --    AST 17  --    ALT 22  --    BILT 0.4  --    TP 6.9  --        Estimated Creatinine Clearance: 42.5 mL/min (based on SCr of 0.8 mg/dL).    No results for input(s): \"TROP\", \"TROPHS\", \"CK\" in the last 168 hours.    No results for input(s): \"PTP\", \"INR\" in the last 168 hours.               Microbiology    Hospital Encounter on 24   1. Urine Culture, Routine     Status: Abnormal (Preliminary result)    Collection Time: 24 12:56 AM    Specimen: Urine, clean catch   Result Value Ref Range    Urine Culture >100,000 CFU/ML Escherichia coli (A) N/A         Imaging: Reviewed in Epic.    Medications:    cyanocobalamin  1,000 mcg Oral Daily    sertraline  50 mg Oral Daily    donepezil  5 mg Oral Nightly    losartan   25 mg Oral Daily    celecoxib  200 mg Oral Daily    heparin  5,000 Units Subcutaneous 2 times per day    ceFAZolin  1 g Intravenous Q8H       Assessment & Plan:      #Fall  -Likely mechanical  -PT OT recommend no needs    #UTI  -Urine culture in lab  -IV antibiotics    #Hypertension  -Losartan      Bere Ascencio DO    Supplementary Documentation:     Quality:  DVT Mechanical Prophylaxis:     Early ambuation  DVT Pharmacologic Prophylaxis   Medication    heparin (Porcine) 5000 UNIT/ML injection 5,000 Units                Code Status: Not on file  Osorio: No urinary catheter in place  Osorio Duration (in days):   Central line:    TRUE:     Discharge is dependent on: Clinical improvement  At this point Ms. Mckeon is expected to be discharge to: Home    The 21st Century Cures Act makes medical notes like these available to patients in the interest of transparency. Please be advised this is a medical document. Medical documents are intended to carry relevant information, facts as evident, and the clinical opinion of the practitioner. The medical note is intended as peer to peer communication and may appear blunt or direct. It is written in medical language and may contain abbreviations or verbiage that are unfamiliar.

## 2024-07-03 VITALS
RESPIRATION RATE: 18 BRPM | SYSTOLIC BLOOD PRESSURE: 140 MMHG | DIASTOLIC BLOOD PRESSURE: 51 MMHG | HEART RATE: 63 BPM | WEIGHT: 138.69 LBS | BODY MASS INDEX: 25 KG/M2 | TEMPERATURE: 98 F | OXYGEN SATURATION: 96 %

## 2024-07-03 PROCEDURE — 99239 HOSP IP/OBS DSCHRG MGMT >30: CPT | Performed by: STUDENT IN AN ORGANIZED HEALTH CARE EDUCATION/TRAINING PROGRAM

## 2024-07-03 RX ORDER — CEPHALEXIN 500 MG/1
500 CAPSULE ORAL 2 TIMES DAILY
Qty: 6 CAPSULE | Refills: 0 | Status: SHIPPED | OUTPATIENT
Start: 2024-07-03 | End: 2024-07-06

## 2024-07-03 RX ORDER — LEVOFLOXACIN 750 MG/1
750 TABLET, FILM COATED ORAL DAILY
Qty: 3 TABLET | Refills: 0 | Status: SHIPPED | OUTPATIENT
Start: 2024-07-03 | End: 2024-07-03

## 2024-07-03 NOTE — CM/SW NOTE
Pt medically cleared for DC today. [facility] placed on will-call for DC today. PCS form completed and available for RN.     [facility] liaison Alisha notified of pending dc today.     CM/SW will remain available for DC planning and/or support.     ANYA SegoviaN, CMSRN    [id number]

## 2024-07-03 NOTE — DISCHARGE SUMMARY
[facility]     Melva Mckeon Patient Status:  Observation    1940 MRN [id number]   [facility] 4NW-A Attending No att. providers found   Hosp Day # 0 PCP Jose Alfredo Bartholomew MD     Date of Admission: 2024  Date of Discharge:  7/3/2024     Discharge Disposition: Home or Self Care    Discharge Diagnosis:  #Fall  #E. coli UTI  #Hypertension    History of Present Illness: Melva Mckeon is a 83 year old female with a past medical history of arthritis, hypertension, dementia.  The patient was found laying in the floor of her apartment.  She does not recall the events that led up to this.  Vomit next to her and was brought to the emergency room.  In the emergency room she was noted to have mild hyponatremia and abnormal urinalysis.        Brief Synopsis: CT head was unremarkable.  PT OT recommended discharge home with home health.  Urine culture grew E. coli.  Patient was started on IV antibiotics.  She was given IV fluids.  Patient was discharged home with outpatient follow-up.    Lace+ Score: 70  59-90 High Risk  29-58 Medium Risk  0-28   Low Risk       TCM Follow-Up Recommendation:  LACE > 58: High Risk of readmission after discharge from the hospital.      Procedures during hospitalization:   None    Incidental or significant findings and recommendations (brief descriptions):  See brief synopsis above    Lab/Test results pending at Discharge:   None    Consultants:  None    Discharge Medication List:     Discharge Medications        START taking these medications        Instructions Prescription details   cephalexin 500 MG Caps  Commonly known as: Keflex      Take 1 capsule (500 mg total) by mouth 2 (two) times daily for 3 days.   Stop taking on: 2024  Quantity: 6 capsule  Refills: 0            CONTINUE taking these medications        Instructions Prescription details   acetaminophen 500 MG Tabs  Commonly known as: Tylenol Extra Strength      Take 1 tablet (500  mg total) by mouth 3 (three) times daily as needed for Pain.   Refills: 0     celecoxib 200 MG Caps  Commonly known as: CeleBREX      TAKE ONE CAPSULE BY MOUTH DAILY.   Quantity: 30 capsule  Refills: 2     cyanocobalamin 1000 MCG Tabs  Commonly known as: Vitamin B12      Take 1 tablet (1,000 mcg total) by mouth daily.   Refills: 0     donepezil 5 MG Tabs  Commonly known as: Aricept      Take 1 tablet (5 mg total) by mouth nightly.   Quantity: 30 tablet  Refills: 3     ibuprofen 200 MG Tabs  Commonly known as: Motrin      Take 1 tablet (200 mg total) by mouth every 8 (eight) hours as needed for Pain.   Refills: 0     losartan 25 MG Tabs  Commonly known as: Cozaar      Take 1 tablet (25 mg total) by mouth daily.   Quantity: 90 tablet  Refills: 3     sertraline 50 MG Tabs  Commonly known as: Zoloft      Take 1 tablet (50 mg total) by mouth daily.   Quantity: 90 tablet  Refills: 1     Solifenacin Succinate 10 MG Tabs  Commonly known as: VESICARE      Take 1 tablet (10 mg total) by mouth daily.   Quantity: 90 tablet  Refills: 3     triamcinolone 0.1 % Oint  Commonly known as: Kenalog       Refills: 0     Urea 20 Intensive Hydrating 20 % Crea  Generic drug: Urea      MIX WITH TRIAMCINOLONE AND APPLY TOPICALLY TO THE AFFECTED AREA EVERY DAY AS DIRECTED.   Refills: 0               Where to Get Your Medications        These medications were sent to [id number] - [place], [place] - [address] AT [facility] [address], [phone], [phone]  [address], [facility] [address]      Phone: [phone]   cephalexin 500 MG Caps         [place]    Follow-up appointment:   Jose Alfredo Bartholomew MD  [address]  [address]  [facility] [address] [phone]    Follow up in 1 week(s)  Follow up    Appointments for Next 30 Days 7/4/2024 - 8/3/2024        Date Arrival Time Visit Type Length Department Provider     7/16/2024 10:30 AM  [facility] FOLLOW UP [6010] 30 min  [facility], [address] [place] [address] Jessy Jerry APRN    Patient Instructions:         Location Instructions:     Masks are optional for all patients and visitors, unless otherwise indicated.               2024  1:30 PM  EXAM - ESTABLISHED [2784] 10 min [facility], [facility] Donny Oglesby MD    Patient Instructions:         Location Instructions:     Your appointment is located at [address] [place] [address] in [place]; in [facility] near the intersection of [facility] and [facility].  Masks are optional for all patients and visitors, unless otherwise indicated.                      Vital signs:       Physical Exam:    General: No acute distress   Lungs: clear to auscultation  Cardiovascular: S1, S2  Abdomen: Soft    -----------------------------------------------------------------------------------------------  PATIENT DISCHARGE INSTRUCTIONS: See electronic chart    Bere Ascencio,     Total time spent on discharge plannin minutes     The 21st Century Cures Act makes medical notes like these available to patients in the interest of transparency. Please be advised this is a medical document. Medical documents are intended to carry relevant information, facts as evident, and the clinical opinion of the practitioner. The medical note is intended as peer to peer communication and may appear blunt or direct. It is written in medical language and may contain abbreviations or verbiage that are unfamiliar.

## 2024-07-03 NOTE — CM/SW NOTE
07/03/24 1107   Discharge disposition   Expected discharge disposition Home or Self   Post Acute Care Provider Kwesi Arboleda   Discharge transportation [facility]     [facility]     [facility] via [facility] patient/family notified transport is not covered by insurance. Pt/family are agreeable to the charges. Transportation at 3pm. Daughter notified. 24 HR caregiver arranged and will be at the facility at 2pm.     Nurses please call report to:     Kwesi Johnson [phone]    KERRY will continue to follow for plan of care changes and remain available for any additional DC needs or concerns.     Nikia Leyva MSW, LSW  Discharge Planner   [phone]

## 2024-07-03 NOTE — PROGRESS NOTES
[facility]   part of Merged with [facility]     Hospitalist Progress Note     Melva Mckeon Patient Status:  Observation    1940 MRN [id number]   [facility] 4NW-A Attending Bere Ascencio,    Hosp Day # 0 PCP Jose Alfredo Bartholomew MD     Chief Complaint: Fall    Subjective:     Patient has slight headache otherwise feels well    Objective:    Review of Systems:   A comprehensive review of systems was completed; pertinent positive and negatives stated in subjective.    Vital signs:  Temp:  [97.6 °F (36.4 °C)-98.2 °F (36.8 °C)] 97.6 °F (36.4 °C)  Pulse:  [63-74] 63  Resp:  [16-18] 18  BP: (140-159)/(51-83) 140/51  SpO2:  [96 %-100 %] 96 %    Physical Exam:    General: No acute distress  Respiratory: No wheezes, no rhonchi  Cardiovascular: S1, S2, regular rate and rhythm  Abdomen: Soft, Non-tender, non-distended, positive bowel sounds  Neuro: No new focal deficits.   Extremities: No edema      Diagnostic Data:    Labs:  Recent Labs   Lab 24  2244   WBC 8.8   HGB 13.9   MCV 93.3   .0       Recent Labs   Lab 24  2244 24  0634   GLU 99 87   BUN 25* 16   CREATSERUM 0.88 0.80   CA 9.0 8.9   ALB 3.7  --    * 142   K 4.3 4.4    110   CO2 20.0* 27.0   ALKPHO 74  --    AST 17  --    ALT 22  --    BILT 0.4  --    TP 6.9  --        Estimated Creatinine Clearance: 42.5 mL/min (based on SCr of 0.8 mg/dL).    No results for input(s): \"TROP\", \"TROPHS\", \"CK\" in the last 168 hours.    No results for input(s): \"PTP\", \"INR\" in the last 168 hours.               Microbiology    Hospital Encounter on 24   1. Urine Culture, Routine     Status: Abnormal    Collection Time: 24 12:56 AM    Specimen: Urine, clean catch   Result Value Ref Range    Urine Culture >100,000 CFU/ML Escherichia coli (A) N/A       Susceptibility    Escherichia coli -  (no method available)     Ampicillin 4 Sensitive      Cefazolin <=4 Sensitive      Ciprofloxacin <=0.25 Sensitive      Gentamicin <=1  Sensitive      Meropenem <=0.25 Sensitive      Levofloxacin <=0.12 Sensitive      Nitrofurantoin <=16 Sensitive      Piperacillin + Tazobactam <=4 Sensitive      Trimethoprim/Sulfa <=20 Sensitive          Imaging: Reviewed in Epic.    Medications:    cyanocobalamin  1,000 mcg Oral Daily    sertraline  50 mg Oral Daily    donepezil  5 mg Oral Nightly    losartan  25 mg Oral Daily    celecoxib  200 mg Oral Daily    heparin  5,000 Units Subcutaneous 2 times per day    ceFAZolin  1 g Intravenous Q8H       Assessment & Plan:      #Fall  -Likely mechanical  -PT OT recommend no needs    # E. coli UTI  -IV antibiotics    #Hypertension  -Losartan    DC planning      Bere Ascencio DO    Supplementary Documentation:     Quality:  DVT Mechanical Prophylaxis:     Early ambuation  DVT Pharmacologic Prophylaxis   Medication    heparin (Porcine) 5000 UNIT/ML injection 5,000 Units                Code Status: Not on file  Osorio: No urinary catheter in place  Osorio Duration (in days):   Central line:    TRUE: 7/3/2024    Discharge is dependent on: Clinical improvement  At this point Ms. Mckeon is expected to be discharge to: Home    The 21st Century Cures Act makes medical notes like these available to patients in the interest of transparency. Please be advised this is a medical document. Medical documents are intended to carry relevant information, facts as evident, and the clinical opinion of the practitioner. The medical note is intended as peer to peer communication and may appear blunt or direct. It is written in medical language and may contain abbreviations or verbiage that are unfamiliar.

## 2024-07-03 NOTE — BH RN DISCHARGE NOTE
NURSING DISCHARGE NOTE    Discharged Home via Ambulance.  Accompanied by Support staff  Belongings Taken by patient/family.

## 2024-07-03 NOTE — PLAN OF CARE
Received pt a/o x4. VSS. Afebrile. Denies pain. Medication admin per MAR. IV abx infused. Up to bathroom w/ walker & assist. Voiding. Call light within reach. Safety precautions in place.     Problem: GENITOURINARY - ADULT  Goal: Absence of urinary retention  Description: INTERVENTIONS:  - Assess patient’s ability to void and empty bladder  - Monitor intake/output and perform bladder scan as needed  - Follow urinary retention protocol/standard of care  - Consider collaborating with pharmacy to review patient's medication profile  - Implement strategies to promote bladder emptying  Outcome: Progressing     Problem: Impaired Functional Mobility  Goal: Achieve highest/safest level of mobility/gait  Description: Interventions:  - Assess patient's functional ability and stability  - Promote increasing activity/tolerance for mobility and gait  - Educate and engage patient/family in tolerated activity level and precautions  Outcome: Progressing

## 2024-07-03 NOTE — PLAN OF CARE
Patient is A/O x 4, can be forgetful. VSS. Afebrile. Room air. Complained of headache pain; PRN tylenol given with good relief. Ambulates with assist and a walker. Voids. Regular diet; tolerated well. All medications given per MAR. IV abx infused per order. PIV saline locked. Safety precautions in place. Call light within reach. Daughter Josy updated on POC. Patient has been cleared for discharge. See SW notes.    Problem: GENITOURINARY - ADULT  Goal: Absence of urinary retention  Description: INTERVENTIONS:  - Assess patient’s ability to void and empty bladder  - Monitor intake/output and perform bladder scan as needed  - Follow urinary retention protocol/standard of care  - Consider collaborating with pharmacy to review patient's medication profile  - Implement strategies to promote bladder emptying  Outcome: Adequate for Discharge     Problem: Impaired Functional Mobility  Goal: Achieve highest/safest level of mobility/gait  Description: Interventions:  - Assess patient's functional ability and stability  - Promote increasing activity/tolerance for mobility and gait  - Educate and engage patient/family in tolerated activity level and precautions  - Recommend use of  RW  Problem: Impaired Activities of Daily Living  Goal: Achieve highest/safest level of independence in self care  Description: Interventions:  - Assess ability and encourage patient to participate in ADLs to maximize function  - Promote sitting position while performing ADLs such as feeding, grooming, and bathing  - Educate and encourage patient/family in tolerated functional activity level and precautions during self-care  - Encourage patient to incorporate impaired side during daily activities to promote function  Problem: Impaired Cognition  Goal: Patient will exhibit improved attention, thought processing and/or memory  Description: Interventions:  - Minimize distractions in the room when full attention is required  - Allow additional time for  processing after asking questions or providing instructions  - Encourage use of hearing aids  - Encourage use of eye glasses  Outcome: Adequate for Discharge     Outcome: Adequate for Discharge    for transfers and ambulation  Outcome: Adequate for Discharge

## 2024-07-05 ENCOUNTER — PATIENT OUTREACH (OUTPATIENT)
Dept: CASE MANAGEMENT | Age: 84
End: 2024-07-05

## 2024-07-05 DIAGNOSIS — Z02.9 ENCOUNTERS FOR UNSPECIFIED ADMINISTRATIVE PURPOSE: ICD-10-CM

## 2024-07-05 DIAGNOSIS — N39.0 URINARY TRACT INFECTION WITHOUT HEMATURIA, SITE UNSPECIFIED: Primary | ICD-10-CM

## 2024-07-05 PROCEDURE — 1111F DSCHRG MED/CURRENT MED MERGE: CPT

## 2024-07-05 NOTE — PROGRESS NOTES
FIDE called and spoke with patient's daughter Josy briefly- Josy stated she is on the other line with the long term care provider for her mother and is requesting to call me back directly later. FIDE provided her direct contact information.

## 2024-07-08 ENCOUNTER — TELEPHONE (OUTPATIENT)
Dept: INTERNAL MEDICINE CLINIC | Facility: CLINIC | Age: 84
End: 2024-07-08

## 2024-07-08 NOTE — PROGRESS NOTES
Initial Post Discharge Follow Up   Discharge Date: 7/3/24  Contact Date: 7/8/2024    Consent Verification:  Assessment Completed With: Caregiver: Josy Permission received per patient?  written  HIPAA Verified?  Yes    Discharge Dx:   Fall  E. coli UTI  Hypertension    General:   How have you been since your discharge from the hospital? The daughter reported improvement with patient symptoms. The patient has been ambulating at home with the use of a walker. The patient is scheduled to begin home health physical therapy. The patient has completed the antibiotic therapy. The daughter urinary frequency/urgency, hematuria, dysuria, or fever. The daughter reported acute confusion is resolved and the patient returned to baseline. The daughter denies any shortness of breath, chest pain, or dizziness. The patient does check blood pressure at home.   Do you have any pain since discharge?  No    How well was your pain managed while in the hospital? N/A  When you were leaving the hospital were your discharge instructions reviewed with you? Yes  How well were your discharge instructions explained to you?   On a scale of 1-5   1- Very Poor and 5- Very well   Very Well  Do you have any questions about your discharge instructions?  No  Before leaving the hospital was your diagnoses explained to you? Yes  Do you have any questions about your diagnoses? No  Are you able to perform normal daily activities of living as you have prior to your hospital stay (dressing, bathing, ambulating to the bathroom, etc)? no The patient is ambulating at home with the use of a walker under supervision of a caregiver. The daughter reported strength has been improving at home.   The patient will begin physical therapy this week.   (NCM) Was patient given a different diet per AVS? no      Medications:   Current Outpatient Medications   Medication Sig Dispense Refill    CELECOXIB 200 MG Oral Cap TAKE ONE CAPSULE BY MOUTH DAILY. 30 capsule 2    losartan  25 MG Oral Tab Take 1 tablet (25 mg total) by mouth daily. 90 tablet 3    donepezil 5 MG Oral Tab Take 1 tablet (5 mg total) by mouth nightly. 30 tablet 3    Solifenacin Succinate 10 MG Oral Tab Take 1 tablet (10 mg total) by mouth daily. 90 tablet 3    sertraline 50 MG Oral Tab Take 1 tablet (50 mg total) by mouth daily. 90 tablet 1    cyanocobalamin 1000 MCG Oral Tab Take 1 tablet (1,000 mcg total) by mouth daily.      UREA 20 INTENSIVE HYDRATING 20 % External Cream MIX WITH TRIAMCINOLONE AND APPLY TOPICALLY TO THE AFFECTED AREA EVERY DAY AS DIRECTED.      ibuprofen 200 MG Oral Tab Take 1 tablet (200 mg total) by mouth every 8 (eight) hours as needed for Pain.  0    acetaminophen 500 MG Oral Tab Take 1 tablet (500 mg total) by mouth 3 (three) times daily as needed for Pain.  0    triamcinolone acetonide 0.1 % External Ointment        Were there any changes to your current medication(s) noted on the AVS? Yes  If so, were these medication changes discussed with you prior to leaving the hospital? Yes  If a new medication was prescribed:    Was the new medication's purpose & side effects reviewed? Yes  Do you have any questions about your new medication? No  Did you  your discharge medications when you left the hospital? Yes  Let's go over your medications together to make sure we are not missing anything. Medications Reviewed  The patient completed antibiotic therapy as prescribed.   Are there any reasons that keep you from taking your medication as prescribed? No      Discharge medications reviewed/discussed/and reconciled against outpatient medications with patient.  Any changes or updates to medications sent to PCP.  Patient Acknowledged     Referrals/orders at D/C: The patient lives at [facility].   Referrals/orders placed at D/C? yes;The patient also has a 24 hour caregiver.   What services:   Home health   (If HH was ordered) Has HH been set up?  Yes; The patient is scheduled  for physical therapy on 7/100/2024.     If Yes: [facility]om[facility]   Except for Home Health Services mentioned above, have you scheduled these other services? No    DME ordered at D/C? No    The patient has a walker at home.     Discharge orders, AVS reviewed and discussed with patient. Any changes or updates to orders sent to PCP.  Patient Acknowledged      SDOH:   Transportation:    Transportation Needs: No Transportation Needs (7/8/2024)    Transportation Needs     Lack of Transportation: No     Car Seat: Not on file       Financial Strain:   Financial Resource Strain: Low Risk  (7/8/2024)    Financial Resource Strain     Difficulty of Paying Living Expenses: Not hard at all     Med Affordability: No       Follow up appointments:      Your appointments       Date & Time Appointment Department ([place])    Jul 16, 2024 10:30 AM CDT Hospital Follow Up with Jessy Jerry APRN [address] ([facility])        Jul 16, 2024 1:30 PM CDT Exam - Established with Donny Oglesby MD [facility] ([facility])        Aug 07, 2024 1:30 PM CDT Medicare Annual Well Visit with Jessy Jerry APRN [address] ([facility])        Sep 18, 2024 2:00 PM CDT Follow Up Visit with Angeles Seymour MD [facility] ([facility])        Oct 11, 2024 11:00 AM CDT Exam - Established with Freddy Sanders DPM [facility] ([facility])              [address]  [facility]  [address] [facility] [address]  [facility] [id number] [phone] [facility]  [address] [facility] [address]  [facility]  [id number] [phone] [facility], [address] [facility] [address] [facility]  [address] [facility]  [facility] [address]  [phone]    [facility], [place] [address] [address] [id number] [phone]            TCC  Was TCC ordered: No      PCP (If no TCC appointment)  Does patient already have a PCP appointment scheduled? Yes; The patient is scheduled with ANASTACIO Quinteros, on 7/16/2024.   [facility] Confirmed PCP office TCM appointment with the daughter. The daughter declined scheduling a sooner appointment when recommended by the nurse care manager. A telephone encounter was sent to the primary care physician office for an appointment review.       Specialist    Does the patient have any other follow up appointment(s) needing to be scheduled? No      Is there any reason as to why you cannot make your appointment(s)?  No     Needs post D/C:   Now that you are home, are there any needs or concerns you need addressed before your next visit with your PCP?  (DME, meds, questions, etc.): No    Interventions by [facility]:    Reviewed all discharge instructions with the patient's daughter with a focus on fall precautions/caregiver care, diet, signs/symptoms of urinary tract infection and antibiotic therapy. All medications were reviewed and educated on the importance of taking the medications as directed.  Patient symptoms were assessed, education was completed for signs/symptoms to monitor, and reviewed when to contact providers versus go to the emergency department/call 911. Appointments were reviewed and stressed the importance of a close follow up with providers. A telephone encounter was sent to the primary care provider's office for an appointment review.  The patient's daughter verbalized understanding and will contact the office with any further questions or concerns.       Overall Rating:   How would  you rate the care you received while in the hospital? good    CCM referral placed:    No    BOOK BY DATE: 7/17/2024

## 2024-07-08 NOTE — PROGRESS NOTES
Attempted to reach the patient to complete a Transitional Care Management-Hospital follow up call. Left a message for the patient to call the nurse care manager back at, [phone].

## 2024-07-08 NOTE — TELEPHONE ENCOUNTER
Spoke to the patient's daughter today for a Transitional Care Management hospital follow up call. The patient is scheduled for a Transitional Care Management appointment with ANASTACIO Terry, on 7/16/2024, but a Transitional Care Management/hospital follow up appointment is recommended by 7/10/2024 as the patient is a high risk for readmission.       The daughter was unable to schedule a sooner appointment when offered by the nurse care manager. Please advise.    BOOK BY DATE (last date for Transitional Care Management): 7/17/2024      Please follow up with the patient and assist with scheduling a sooner Transitional Care Management/hospital follow up appointment.  Thank you!

## 2024-07-15 ENCOUNTER — PATIENT MESSAGE (OUTPATIENT)
Dept: PODIATRY CLINIC | Facility: CLINIC | Age: 84
End: 2024-07-15

## 2024-07-15 DIAGNOSIS — M79.672 FOOT PAIN, LEFT: ICD-10-CM

## 2024-07-15 DIAGNOSIS — M19.072 ARTHROSIS OF MIDFOOT, LEFT: Primary | ICD-10-CM

## 2024-07-16 ENCOUNTER — OFFICE VISIT (OUTPATIENT)
Facility: LOCATION | Age: 84
End: 2024-07-16
Payer: MEDICARE

## 2024-07-16 ENCOUNTER — OFFICE VISIT (OUTPATIENT)
Dept: INTERNAL MEDICINE CLINIC | Facility: CLINIC | Age: 84
End: 2024-07-16
Payer: MEDICARE

## 2024-07-16 VITALS
TEMPERATURE: 97 F | RESPIRATION RATE: 23 BRPM | SYSTOLIC BLOOD PRESSURE: 120 MMHG | OXYGEN SATURATION: 98 % | WEIGHT: 137 LBS | BODY MASS INDEX: 24 KG/M2 | DIASTOLIC BLOOD PRESSURE: 68 MMHG | HEART RATE: 75 BPM

## 2024-07-16 DIAGNOSIS — N39.0 URINARY TRACT INFECTION WITHOUT HEMATURIA, SITE UNSPECIFIED: Primary | ICD-10-CM

## 2024-07-16 DIAGNOSIS — E87.1 HYPONATREMIA: ICD-10-CM

## 2024-07-16 DIAGNOSIS — R26.81 UNSTEADY GAIT WHEN WALKING: ICD-10-CM

## 2024-07-16 DIAGNOSIS — H61.23 CERUMEN DEBRIS ON TYMPANIC MEMBRANE OF BOTH EARS: Primary | ICD-10-CM

## 2024-07-16 DIAGNOSIS — F41.9 ANXIETY: ICD-10-CM

## 2024-07-16 DIAGNOSIS — E78.5 DYSLIPIDEMIA: ICD-10-CM
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
## 2024-07-16 DIAGNOSIS — F02.80 ALZHEIMER'S DEMENTIA WITHOUT BEHAVIORAL DISTURBANCE, PSYCHOTIC DISTURBANCE, MOOD DISTURBANCE, OR ANXIETY, UNSPECIFIED DEMENTIA SEVERITY, UNSPECIFIED TIMING OF DEMENTIA ONSET (HCC): ICD-10-CM

## 2024-07-16 DIAGNOSIS — H90.3 SENSORINEURAL HEARING LOSS (SNHL) OF BOTH EARS: ICD-10-CM

## 2024-07-16 DIAGNOSIS — G30.9 ALZHEIMER'S DEMENTIA WITHOUT BEHAVIORAL DISTURBANCE, PSYCHOTIC DISTURBANCE, MOOD DISTURBANCE, OR ANXIETY, UNSPECIFIED DEMENTIA SEVERITY, UNSPECIFIED TIMING OF DEMENTIA ONSET (HCC): ICD-10-CM

## 2024-07-16 DIAGNOSIS — R41.0 ACUTE CONFUSION: ICD-10-CM

## 2024-07-16 PROBLEM — E87.20 METABOLIC ACIDOSIS: Status: RESOLVED | Noted: 2024-07-01 | Resolved: 2024-07-16

## 2024-07-16 PROBLEM — R11.11 VOMITING WITHOUT NAUSEA, UNSPECIFIED VOMITING TYPE: Status: RESOLVED | Noted: 2024-07-01 | Resolved: 2024-07-16
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
## 2024-07-16 PROBLEM — R79.89 AZOTEMIA: Status: RESOLVED | Noted: 2024-07-01 | Resolved: 2024-07-16

## 2024-07-16 PROCEDURE — 99495 TRANSJ CARE MGMT MOD F2F 14D: CPT | Performed by: NURSE PRACTITIONER

## 2024-07-16 PROCEDURE — 99213 OFFICE O/P EST LOW 20 MIN: CPT | Performed by: OTOLARYNGOLOGY

## 2024-07-16 NOTE — PROGRESS NOTES
Subjective:   Melva Mckeon is a 84 year old female who presents for hospital follow up.   She was discharged from [facility] to Home or Self Care  Admission Date: 6/30/24   Discharge Date: 7/3/24  Hospital Discharge Diagnosis: acute confusion  Fall  UTI  Dementia.     Interactive contact within 2 business days post discharge first initiated on Date: 7/5/2024    During the visit, the following was completed:  Obtained and reviewed discharge summary, continuity of care documents, and Hospitalist notes  Reviewed Labs (CBC, CMP)    HPI: here for hospital follow up   admitted via ER after being found at her apt with vomit next to her.  She was unable to recall events.  UA with UTI as well as mild hyponatremia.   Hospital course was complicated with increased confusion.  Treated with IV antibiotics.  Discharged with keflex.   In hind sight maybe more confusion earlier in the week.    Lengthy discussion about independent living, adjusments that can be made and follow up.  She currently has 24 hr care but considering transition to night and then just help with shower.      Donepizil started for memory    OAB urinary frequency.  Vesicare.    Anxiety  sertraline.           History/Other:   Current Medications:  Medication Reconciliation:  I am aware of an inpatient discharge within the last 30 days.  The discharge medication list has been reconciled with the patient's current medication list and reviewed by me.  See medication list for additions of new medication, and changes to current doses of medications and discontinued medications.  Outpatient Medications Marked as Taking for the 7/16/24 encounter (Office Visit) with Jessy Jerry APRN   Medication Sig    CELECOXIB 200 MG Oral Cap TAKE ONE CAPSULE BY MOUTH DAILY.    losartan 25 MG Oral Tab Take 1 tablet (25 mg total) by mouth daily.    donepezil 5 MG Oral Tab Take 1 tablet (5 mg total) by mouth nightly.    Solifenacin Succinate 10 MG Oral Tab Take 1 tablet (10 mg  total) by mouth daily.    sertraline 50 MG Oral Tab Take 1 tablet (50 mg total) by mouth daily.    cyanocobalamin 1000 MCG Oral Tab Take 1 tablet (1,000 mcg total) by mouth daily.    UREA 20 INTENSIVE HYDRATING 20 % External Cream MIX WITH TRIAMCINOLONE AND APPLY TOPICALLY TO THE AFFECTED AREA EVERY DAY AS DIRECTED.    ibuprofen 200 MG Oral Tab Take 1 tablet (200 mg total) by mouth every 8 (eight) hours as needed for Pain.    acetaminophen 500 MG Oral Tab Take 1 tablet (500 mg total) by mouth 3 (three) times daily as needed for Pain.    triamcinolone acetonide 0.1 % External Ointment As needed       Review of Systems:  GENERAL: feeling better   SKIN: ecchymosis resolving.   LUNGS: denies shortness of breath with exertion  CARDIOVASCULAR: denies chest pain on exertion or palpitations  GI: denies abdominal pain, denies heartburn, denies diarrhea    baseline urinary frequency  MUSCULOSKELETAL: denies pain, normal range of motion of extremities    Objective:   No LMP recorded (lmp unknown). Patient is postmenopausal.  Estimated body mass index is 24.27 kg/m² as calculated from the following:    Height as of 2/13/24: 5' 3\" (1.6 m).    Weight as of this encounter: 137 lb (62.1 kg).   /68   Pulse 75   Temp 97.2 °F (36.2 °C) (Temporal)   Resp 23   Wt 137 lb (62.1 kg)   LMP  (LMP Unknown)   SpO2 98%   BMI 24.27 kg/m²    GENERAL: well developed, well nourished, in no apparent distress  CHEST: no chest tenderness  LUNGS: clear to auscultation  CARDIO: RRR   GI: good BS's, no masses, HSM or tenderness  MUSCULOSKELETAL: back is not tender, FROM of the extremities  using walker for ambulation.   EXTREMITIES: no edema  NEURO: Oriented times three,   Assessment & Plan:   1. Urinary tract infection without hematuria, site unspecified (Primary)  improved s/p kelfex.  Discussed UTI and acute confusion in elderly.  Discussed WIC or UC if our office is unavailable.    2. Acute confusion  resolving to baseline   3.  Hyponatremia  resolved.  Monitor.   4. Anxiety  stable  sertraline.   5. Alzheimer's dementia without behavioral disturbance, psychotic disturbance, mood disturbance, or anxiety, unspecified dementia severity, unspecified timing of dementia onset ([facility])  stable Dr Seymour.  Donepizil  6. Dyslipidemia  stable  monitor.   7. Unsteady gait when walking  [facility].    -     PHYSICAL THERAPY EXTERNAL  8. Fall, subsequent encounter  -     PHYSICAL THERAPY EXTERNAL        No follow-ups on file.

## 2024-07-16 NOTE — PROGRESS NOTES
Melva Mckeon is a 84 year old female.   Chief Complaint   Patient presents with    Cerumen Impaction     HPI:   She has sensorineural hearing loss.  She also gets wax impactions which bothers her hearing.    REVIEW OF SYSTEMS:   GENERAL HEALTH: feels well otherwise  GENERAL : denies fever, chills, sweats, weight loss, weight gain  SKIN: denies any unusual skin lesions or rashes  RESPIRATORY: denies shortness of breath with exertion  NEURO: denies headaches    EXAM:   LMP  (LMP Unknown)     System Findings Details   Constitutional  Overall appearance - Normal.   Psychiatric  Orientation - Oriented to time, place, person & situation. Appropriate mood and affect.   Head/Face  Facial features -- Normal. Skull - Normal.   Eyes  Pupils equal ,round ,react to light and accomidate   Ears, Nose, Throat, Neck  Some wax bilaterally worse on the right-hand side removed today under the microscope your canals and tympanic membranes are clear without signs of perforation   Neurological  Memory - Normal. Cranial nerves - Cranial nerves II through XII grossly intact.   Lymph Detail  Submental. Submandibular. Anterior cervical. Posterior cervical. Supraclavicular.       ASSESSMENT AND PLAN:   1. Cerumen debris on tympanic membrane of both ears  Status post removal she will see me back as needed    2. Sensorineural hearing loss (SNHL) of both ears  She will continue with hearing aids.  The audiologist to visit her at [facility].      The patient indicates understanding of these issues and agrees to the plan.    No follow-ups on file.    Donny Oglesby MD  7/16/2024  2:39 PM

## 2024-07-17 NOTE — TELEPHONE ENCOUNTER
Patient has seen Dr. Rodriguez in the past and most recently has been seeing me for pain. Happy to try other anti inflammatories like meloxicam. May be benefifiial to obtain other opinion from Dr. Tian?

## 2024-07-21 RX ORDER — METHYLPREDNISOLONE 4 MG/1
TABLET ORAL
Qty: 21 TABLET | Refills: 0 | Status: SHIPPED | OUTPATIENT
Start: 2024-07-21

## 2024-07-22 DIAGNOSIS — Z00.00 ENCOUNTER FOR ANNUAL HEALTH EXAMINATION: ICD-10-CM

## 2024-07-22 DIAGNOSIS — E78.5 DYSLIPIDEMIA: ICD-10-CM

## 2024-07-22 DIAGNOSIS — E87.1 HYPONATREMIA: Primary | ICD-10-CM

## 2024-07-25 NOTE — TELEPHONE ENCOUNTER
A Transitional Care Management-hospital follow up appointment was completed with ANASTACIO Quinteros, on 7/16/2024. Nurse care manager closing encounter.

## 2024-07-26 ENCOUNTER — LAB ENCOUNTER (OUTPATIENT)
Dept: LAB | Age: 84
End: 2024-07-26
Attending: NURSE PRACTITIONER
Payer: MEDICARE

## 2024-07-26 DIAGNOSIS — N39.0 RECURRENT UTI: ICD-10-CM

## 2024-07-26 DIAGNOSIS — Z00.00 ENCOUNTER FOR ANNUAL HEALTH EXAMINATION: ICD-10-CM

## 2024-07-26 DIAGNOSIS — E55.9 VITAMIN D DEFICIENCY: ICD-10-CM

## 2024-07-26 DIAGNOSIS — E87.1 HYPONATREMIA: ICD-10-CM

## 2024-07-26 DIAGNOSIS — E53.8 VITAMIN B 12 DEFICIENCY: ICD-10-CM

## 2024-07-26 DIAGNOSIS — E78.5 DYSLIPIDEMIA: ICD-10-CM

## 2024-07-26 LAB
BILIRUB UR QL STRIP.AUTO: NEGATIVE
CHOLEST SERPL-MCNC: 270 MG/DL (ref ?–200)
CLARITY UR REFRACT.AUTO: CLEAR
FASTING PATIENT LIPID ANSWER: YES
GLUCOSE UR STRIP.AUTO-MCNC: NORMAL MG/DL
HDLC SERPL-MCNC: 106 MG/DL (ref 40–59)
HYALINE CASTS #/AREA URNS AUTO: PRESENT /LPF
KETONES UR STRIP.AUTO-MCNC: NEGATIVE MG/DL
LDLC SERPL CALC-MCNC: 153 MG/DL (ref ?–100)
LEUKOCYTE ESTERASE UR QL STRIP.AUTO: 250
NITRITE UR QL STRIP.AUTO: NEGATIVE
NONHDLC SERPL-MCNC: 164 MG/DL (ref ?–130)
PH UR STRIP.AUTO: 6.5 [PH] (ref 5–8)
PROT UR STRIP.AUTO-MCNC: NEGATIVE MG/DL
SP GR UR STRIP.AUTO: 1.01 (ref 1–1.03)
TRIGL SERPL-MCNC: 70 MG/DL (ref 30–149)
TSI SER-ACNC: 0.96 MIU/ML (ref 0.55–4.78)
UROBILINOGEN UR STRIP.AUTO-MCNC: NORMAL MG/DL
VIT B12 SERPL-MCNC: 1125 PG/ML (ref 211–911)
VIT D+METAB SERPL-MCNC: 19.1 NG/ML (ref 30–100)
VLDLC SERPL CALC-MCNC: 13 MG/DL (ref 0–30)

## 2024-07-26 PROCEDURE — 82306 VITAMIN D 25 HYDROXY: CPT

## 2024-07-26 PROCEDURE — 80061 LIPID PANEL: CPT

## 2024-07-26 PROCEDURE — 87086 URINE CULTURE/COLONY COUNT: CPT

## 2024-07-26 PROCEDURE — 84443 ASSAY THYROID STIM HORMONE: CPT

## 2024-07-26 PROCEDURE — 36415 COLL VENOUS BLD VENIPUNCTURE: CPT

## 2024-07-26 PROCEDURE — 81001 URINALYSIS AUTO W/SCOPE: CPT

## 2024-07-26 PROCEDURE — 82607 VITAMIN B-12: CPT

## 2024-07-26 RX ORDER — NITROFURANTOIN 25; 75 MG/1; MG/1
100 CAPSULE ORAL 2 TIMES DAILY
Qty: 14 CAPSULE | Refills: 0 | Status: SHIPPED | OUTPATIENT
Start: 2024-07-26

## 2024-07-28 RX ORDER — ERGOCALCIFEROL 1.25 MG/1
50000 CAPSULE ORAL WEEKLY
Qty: 12 CAPSULE | Refills: 0 | Status: SHIPPED | OUTPATIENT
Start: 2024-07-28 | End: 2024-10-26

## 2024-08-06 PROBLEM — N39.0 RECURRENT UTI: Status: ACTIVE | Noted: 2024-08-06

## 2024-08-06 PROBLEM — M79.672 LEFT FOOT PAIN: Status: RESOLVED | Noted: 2020-08-12 | Resolved: 2024-08-06

## 2024-08-06 NOTE — PROGRESS NOTES
Subjective:   Melva Mckeon is a 84 year old female who presents for a Subsequent Annual Wellness visit (Pt already had Initial Annual Wellness) and scheduled follow up of multiple significant but stable problems.     Recurrent UTI  has upcoming appt with urology  most recent culture negative but daughter reports improvement of cognitive function on antibiotic   she has been doing well and will be cutting back on caregiver hours.       Vit d def 19.1  weekly supplement.     B12 def  stable  1125   otc supplement.    Hyponatremia  stable  monitor  recent 142  Anxiety  sertraline.   Memory changes.  Donepezil  Dr Seymour  Dyslipidemia.  Total 270      higher than previously. Will monitor.    Following with Dr Sanders for foot pain.  Trying celebrex.   Right knee OA  Dr Smith.      History/Other:   Fall Risk Assessment:   She has been screened for Falls and is High Risk. Fall Prevention information provided to patient in After Visit Summary.    Do you feel unsteady when standing or walking?: Yes  Do you worry about falling?: Yes  Have you fallen in the past year?: Yes  How many times have you fallen?: (P) 1  Were you injured?: (P) No     Cognitive Assessment:   Abnormal  What day of the week is this?: Incorrect  What month is it?: Incorrect  What year is it?: Incorrect  Recall \"Ball\": Correct  Recall \"Flag\": Correct  Recall \"Tree\": Correct      Functional Ability/Status:   Melva Mckeon has some abnormal functions as listed below:  She has Dressing and/or Bathing issues based on screening of functional status.  Difficulty dressing or bathing?: Yes  Bathing or Showering: Need some help  Dressing: Need some help  She has Toileting difficulties based on screening of functional status.She has Driving difficulties based on screening of functional status. She has Meal Preparation difficulties based on screening of functional status.She has difficulties Managing Money/Bills based on screening of functional status.She  has difficulties Shopping for Groceries based on screening of functional status. She has difficulties Taking Meds as Rx'd based on screening of functional status. She has Hearing problems based on screening of functional status.She has Walking problems based on screening of functional status. She has problems with Daily Activities based on screening of functional status. She has problems with Memory based on screening of functional status.       Depression Screening (PHQ):  PHQ-2 SCORE: 0  , done 7/31/2024            Advanced Directives:   She does have a Living Will but we do NOT have it on file in Epic.    She does have a POA but we do NOT have it on file in [employer].    Patient has Advance Care Planning documents but we do not have a copy in EMR. Discussed Advanced Care Planning with patient and instructed patient to get our office a copy to be scanned into EMR.      Patient Active Problem List   Diagnosis    Vitamin D deficiency    Allergic rhinitis    Osteopenia    Vitamin B 12 deficiency    Lumbosacral spondylosis    Lumbar facet arthropathy    Arthritis of sacroiliac joint    Carotid artery disease (HCC)    History of total left knee replacement (TKR)    Primary osteoarthritis of right knee    History of malignant neoplasm of skin    Dyslipidemia    Anxiety    Tendonitis of foot    Microhematuria    Memory change    OAB (overactive bladder)    Dense breast    Alzheimer's dementia without behavioral disturbance, psychotic disturbance, mood disturbance, or anxiety (HCC)    Hyponatremia    Recurrent UTI     Allergies:  She is allergic to calcium acetate.    Current Medications:  Outpatient Medications Marked as Taking for the 8/7/24 encounter (Office Visit) with Jessy Jerry APRN   Medication Sig    ergocalciferol 1.25 MG (47040 UT) Oral Cap Take 1 capsule (50,000 Units total) by mouth once a week.    CELECOXIB 200 MG Oral Cap TAKE ONE CAPSULE BY MOUTH DAILY.    losartan 25 MG Oral Tab Take 1 tablet (25 mg total)  by mouth daily.    donepezil 5 MG Oral Tab Take 1 tablet (5 mg total) by mouth nightly.    Solifenacin Succinate 10 MG Oral Tab Take 1 tablet (10 mg total) by mouth daily.    sertraline 50 MG Oral Tab Take 1 tablet (50 mg total) by mouth daily.    cyanocobalamin 1000 MCG Oral Tab Take 1 tablet (1,000 mcg total) by mouth daily.    UREA 20 INTENSIVE HYDRATING 20 % External Cream MIX WITH TRIAMCINOLONE AND APPLY TOPICALLY TO THE AFFECTED AREA EVERY DAY AS DIRECTED.    ibuprofen 200 MG Oral Tab Take 1 tablet (200 mg total) by mouth every 8 (eight) hours as needed for Pain.    acetaminophen 500 MG Oral Tab Take 1 tablet (500 mg total) by mouth 3 (three) times daily as needed for Pain.    triamcinolone acetonide 0.1 % External Ointment As needed       Medical History:  She  has a past medical history of Acute sinusitis, unspecified, Arthritis (), Bulging discs, Calculus of kidney (), Encounter for screening colonoscopy (2015), Headache(784.0), Herpes zoster without mention of complication, Lumbago, Myalgia and myositis, unspecified, Personal history of other malignant neoplasm of skin (2016), Rash and other nonspecific skin eruption, Toxoplasmosis (1982), and Unspecified vitamin D deficiency.  Surgical History:  She  has a past surgical history that includes appendectomy; tonsillectomy; upper gi endoscopy,biopsy; other (); colonoscopy (05, 2015); skin surgery; knee arthroscopy (Left, 10/21/16--Dr. Smith); knee surgery (Left, 2019); knee replacement surgery; other surgical history (2011); other surgical history (N/A, 1962); other surgical history (10/29/2019); appendectomy (); cataract ();  (); and adenoidectomy ().   Family History:  Her family history includes Arthritis in her mother; Breast Cancer in her paternal aunt; Breast Cancer (age of onset: 89) in her mother; Cancer in her father and mother; Diabetes in her father; Other in her mother;  cerebral artery aneurysm in her father; diabetes mellitus in her father; leukemia in her father.  Social History:  She  reports that she has never smoked. She has never used smokeless tobacco. She reports current alcohol use. She reports that she does not use drugs.    Tobacco:  She has never smoked tobacco.    CAGE Alcohol Screen:   CAGE screening score of 0 on 7/31/2024, showing low risk of alcohol abuse.      Patient Care Team:  Jose Alfredo Bartholomew MD as PCP - General  Jessy Jerry APRN (Internal Medicine)  Kyle English MD (GASTROENTEROLOGY)  Karen Barroso PA as Consulting Physician (Physician Assistant)  Leon Smith MD (SURGERY, ORTHOPEDIC)  Angeles Seymour MD as Consulting Physician (NEUROLOGY)  Kristin Dugan RN as [facility] TCM Care Manager (TCM Management)    Review of Systems     Negative except as above.     Objective:   Physical Exam  Physical Exam  General Appearance:  Alert, cooperative, no distress, appears stated age   Head:  Normocephalic, without obvious abnormality, atraumatic   Eyes:  PERRL, conjunctiva/corneas clear, EOM's intact both eyes   Ears:  Bilateral hearing aids.    Nose: Nares normal, septum midline,mucosa normal, no drainage or sinus tenderness   Throat: Lips, mucosa, and tongue normal; teeth and gums normal   Neck: Supple, symmetrical, trachea midline, no adenopathy;  thyroid: not enlarged, symmetric, no JVD   Back:   Symmetric, no curvature, ROM normal, no CVA tenderness   Lungs:   Clear to auscultation bilaterally, respirations unlabored   Heart:  Regular rate and rhythm, S1 and S2 normal,    Abdomen:   Soft, non-tender, bowel sounds active all four quadrants,  no masses,    Extremities: Extremities normal, atraumatic, no edema   compression left foot     Pulses: symmetric   Skin: Skin color, texture, turgor normal,    Lymph nodes: Cervical, supraclavicular nodes normal   Neurologic: Normal           /62 (BP Location: Right arm, Patient Position: Sitting, Cuff Size:  adult)   Pulse 74   Temp 96.8 °F (36 °C) (Temporal)   Resp 18   Ht 5' 3.78\" (1.62 m)   Wt 135 lb 6.4 oz (61.4 kg)   LMP  (LMP Unknown)   SpO2 98%   BMI 23.40 kg/m²  Estimated body mass index is 23.4 kg/m² as calculated from the following:    Height as of this encounter: 5' 3.78\" (1.62 m).    Weight as of this encounter: 135 lb 6.4 oz (61.4 kg).    Medicare Hearing Assessment:   Hearing Screening    Time taken: 8/7/2024  1:34 PM  Entry User: Aminata Whitmore MA  Screening Method: Finger Rub  Finger Rub Result: Fail (Comment: Bilateral hearing aids)         Visual Acuity:   Right Eye Visual Acuity: Uncorrected Right Eye Chart Acuity: 20/50   Left Eye Visual Acuity: Uncorrected Left Eye Chart Acuity: 20/100   Both Eyes Visual Acuity: Uncorrected Both Eyes Chart Acuity: 20/50   Able To Tolerate Visual Acuity: Yes        Assessment & Plan:   Melva Mckeon is a 84 year old female who presents for a Medicare Assessment.     1. Routine general medical examination at a health care facility (Primary)  2. Alzheimer's dementia without behavioral disturbance, psychotic disturbance, mood disturbance, or anxiety, unspecified dementia severity, unspecified timing of dementia onset (HCC)  doing well.  Setback with recent UTI but seems back to baseline.  Cont meds.   3. Bilateral carotid artery stenosis  stable  monitor.   4. Dyslipidemia  stable  cholesterol is up a bit but will monitor.     5. Hyponatremia  stable 142  monitor.   6. Vitamin D deficiency  weekly replacement then daily otc 2000IU   7. Vitamin B 12 deficiency  stable  otc supplement.    8. Lumbar facet arthropathy  stable  monitor.    9. Spondylosis of lumbosacral region without myelopathy or radiculopathy  stable  monitor.   10. Memory change  stable  as above.  Per neuro.    11. Anxiety  stable  sertraline 50mg      12. Arthritis of sacroiliac joint  stable  monitor.   13. History of total left knee replacement (TKR)  stable    14. Osteopenia of spine   stajovon  defers dexa.   15. Primary osteoarthritis of right knee  bothersome to her without much improvement from injection.  Dr Smith.    16. Dense breast  stalbe  defers mammograms.   17. Microhematuria  stable  defer to urology  18. OAB (overactive bladder)  on vesicare.  Using pads.  Monitor.    19. History of malignant neoplasm of skin  stable  follows with derm.   20. Allergic rhinitis, unspecified seasonality, unspecified trigger  stable  monitor    21. Recurrent UTI  has follow up with urology  she has struggled recently with a UTI and the impact it had on her memory and mobility  she seems to be back to baseline.    Other orders  -     Prevnar 20 (PCV20) [74999]    The patient indicates understanding of these issues and agrees to the plan.        No follow-ups on file.     ANASTACIO Quinteros, 8/6/2024     Supplementary Documentation:   General Health:  In the past six months, have you lost more than 10 pounds without trying?: 3 - Don't know  Has your appetite been poor?: Yes  Type of Diet: Other  How does the patient maintain a good energy level?: Daily Walks  How would you describe your daily physical activity?: Light  How would you describe your current health state?: Fair  How do you maintain positive mental well-being?: Social Interaction;Visiting Friends;Visiting Family  On a scale of 0 to 10, with 0 being no pain and 10 being severe pain, what is your pain level?: 4 - (Moderate)  In the past six months, have you experienced urine leakage?: 1-Yes  At any time do you feel concerned for the safety/well-being of yourself and/or your children, in your home or elsewhere?: No  Have you had any immunizations at another office such as Influenza, Hepatitis B, Tetanus, or Pneumococcal?: No    Health Maintenance   Topic Date Due    COVID-19 Vaccine (8 - 2023-24 season) 02/02/2024    Annual Physical  08/02/2024    Influenza Vaccine (1) 10/01/2024    Colorectal Cancer Screening  11/16/2028    Annual Depression  Screening  Completed    Fall Risk Screening (Annual)  Completed    Pneumococcal Vaccine: 65+ Years  Completed    Zoster Vaccines  Completed

## 2024-08-07 ENCOUNTER — OFFICE VISIT (OUTPATIENT)
Dept: INTERNAL MEDICINE CLINIC | Facility: CLINIC | Age: 84
End: 2024-08-07
Payer: MEDICARE

## 2024-08-07 VITALS
BODY MASS INDEX: 23.4 KG/M2 | OXYGEN SATURATION: 98 % | RESPIRATION RATE: 18 BRPM | DIASTOLIC BLOOD PRESSURE: 62 MMHG | SYSTOLIC BLOOD PRESSURE: 120 MMHG | WEIGHT: 135.38 LBS | HEIGHT: 63.78 IN | HEART RATE: 74 BPM | TEMPERATURE: 97 F

## 2024-08-07 DIAGNOSIS — G30.9 ALZHEIMER'S DEMENTIA WITHOUT BEHAVIORAL DISTURBANCE, PSYCHOTIC DISTURBANCE, MOOD DISTURBANCE, OR ANXIETY, UNSPECIFIED DEMENTIA SEVERITY, UNSPECIFIED TIMING OF DEMENTIA ONSET (HCC): ICD-10-CM

## 2024-08-07 DIAGNOSIS — M85.88 OSTEOPENIA OF SPINE: ICD-10-CM

## 2024-08-07 DIAGNOSIS — I65.23 BILATERAL CAROTID ARTERY STENOSIS: ICD-10-CM

## 2024-08-07 DIAGNOSIS — N32.81 OAB (OVERACTIVE BLADDER): ICD-10-CM

## 2024-08-07 DIAGNOSIS — E53.8 VITAMIN B 12 DEFICIENCY: ICD-10-CM

## 2024-08-07 DIAGNOSIS — M47.818 ARTHRITIS OF SACROILIAC JOINT: ICD-10-CM

## 2024-08-07 DIAGNOSIS — F41.9 ANXIETY: ICD-10-CM

## 2024-08-07 DIAGNOSIS — E78.5 DYSLIPIDEMIA: ICD-10-CM

## 2024-08-07 DIAGNOSIS — N39.0 RECURRENT UTI: ICD-10-CM

## 2024-08-07 DIAGNOSIS — R92.30 DENSE BREAST: ICD-10-CM

## 2024-08-07 DIAGNOSIS — E87.1 HYPONATREMIA: ICD-10-CM

## 2024-08-07 DIAGNOSIS — Z85.828 HISTORY OF MALIGNANT NEOPLASM OF SKIN: ICD-10-CM

## 2024-08-07 DIAGNOSIS — M47.817 SPONDYLOSIS OF LUMBOSACRAL REGION WITHOUT MYELOPATHY OR RADICULOPATHY: ICD-10-CM

## 2024-08-07 DIAGNOSIS — M47.816 LUMBAR FACET ARTHROPATHY: ICD-10-CM

## 2024-08-07 DIAGNOSIS — E55.9 VITAMIN D DEFICIENCY: ICD-10-CM

## 2024-08-07 DIAGNOSIS — R31.29 MICROHEMATURIA: ICD-10-CM

## 2024-08-07 DIAGNOSIS — M17.11 PRIMARY OSTEOARTHRITIS OF RIGHT KNEE: ICD-10-CM

## 2024-08-07 DIAGNOSIS — J30.9 ALLERGIC RHINITIS, UNSPECIFIED SEASONALITY, UNSPECIFIED TRIGGER: ICD-10-CM

## 2024-08-07 DIAGNOSIS — R41.3 MEMORY CHANGE: ICD-10-CM

## 2024-08-07 DIAGNOSIS — Z00.00 ROUTINE GENERAL MEDICAL EXAMINATION AT A HEALTH CARE FACILITY: Primary | ICD-10-CM

## 2024-08-07 DIAGNOSIS — Z96.652 HISTORY OF TOTAL LEFT KNEE REPLACEMENT (TKR): ICD-10-CM

## 2024-08-07 DIAGNOSIS — F02.80 ALZHEIMER'S DEMENTIA WITHOUT BEHAVIORAL DISTURBANCE, PSYCHOTIC DISTURBANCE, MOOD DISTURBANCE, OR ANXIETY, UNSPECIFIED DEMENTIA SEVERITY, UNSPECIFIED TIMING OF DEMENTIA ONSET (HCC): ICD-10-CM

## 2024-08-07 PROCEDURE — 90677 PCV20 VACCINE IM: CPT | Performed by: NURSE PRACTITIONER

## 2024-08-07 PROCEDURE — G0439 PPPS, SUBSEQ VISIT: HCPCS | Performed by: NURSE PRACTITIONER

## 2024-08-07 PROCEDURE — 99214 OFFICE O/P EST MOD 30 MIN: CPT | Performed by: NURSE PRACTITIONER

## 2024-08-07 PROCEDURE — G0009 ADMIN PNEUMOCOCCAL VACCINE: HCPCS | Performed by: NURSE PRACTITIONER

## 2024-08-19 ENCOUNTER — OFFICE VISIT (OUTPATIENT)
Facility: LOCATION | Age: 84
End: 2024-08-19
Payer: MEDICARE

## 2024-08-19 DIAGNOSIS — N20.0 NEPHROLITHIASIS: ICD-10-CM

## 2024-08-19 DIAGNOSIS — N39.41 URGE INCONTINENCE: ICD-10-CM

## 2024-08-19 DIAGNOSIS — N32.81 OAB (OVERACTIVE BLADDER): ICD-10-CM

## 2024-08-19 DIAGNOSIS — N39.0 RECURRENT UTI: Primary | ICD-10-CM

## 2024-08-19 DIAGNOSIS — R31.29 MICROSCOPIC HEMATURIA: ICD-10-CM

## 2024-08-19 PROCEDURE — 99204 OFFICE O/P NEW MOD 45 MIN: CPT | Performed by: PHYSICIAN ASSISTANT

## 2024-08-19 NOTE — PROGRESS NOTES
[facility], [address] [place] [address]    Urology Consult Note    History of Present Illness:   Patient is a 84 year old female with hx of vit D deficiency, B12 deficiency, anxiety on sertraline, HL, dementia, right knee OA, nephrolithiasis, who presents today for consultation from Dr. Bartholomew's office for recurrent UTI.    Previously seen by urology, Dr. Ryan Marin, in 2019 for microscopic hematuria. At that time noted that she had history of recurrent UTI in the 1980s and had a bladder instillation to help prevent UTIs and had been infection free since. She also had prior scar tissue calcification post partum that had to be removed from bladder. Renal ultrasound 2019 showed small right kidney stone and multiple renal cysts.  Cystoscopy was performed October 2019 that showed \"notable trabeculation including a suspect mucosal 'bridge' across the anterior dome of the bladder with an iatrogenic 'pocket' without cystitis, inflammatory changes, or polyp\".  Advised to follow up in 5-10 years for repeat cystoscopy.    Reports ~ 1 UTI episodes per year that presents with cognitive changes. Was admitted to [facility] 6/30-7/3/24 for E Coli UTI. No upper tract imaging performed.   Daughter notes significant confusion. Patient has now returned to her baseline.     UA 7/26/24 11-20 WBC/hpf, 3-5 RBC/hpf, no bacteria. Urine culture negative.   Attempted to void today but missed the hat.     Prior antibiotic use: Keflex  Prior evaluation/tests other than urine cultures: as above  Has been taking cranberry for UTI prevention.    Vaginal dryness/irritation: +, related to wearing pads also    Good hygiene/technique: wipes front to back  Diarrhea/constipation: regular currently, denies any complaints- fecal incontinence was noted in 2019 on  notes    Takes vesicare for baseline OAB/UUI.    Drinks ~ 16 oz water per day    Tobacco hx: none  Fam h/o  malignancy or stones: none    HISTORY:  Past Medical History:    Acute  sinusitis, unspecified    Arthritis    Bulging discs    Calculus of kidney    Encounter for screening colonoscopy    Dr.Gonzalo English     Headache(784.0)    Herpes zoster without mention of complication    Lumbago    Myalgia and myositis, unspecified    Personal history of other malignant neoplasm of skin    Hx BCC x2    Rash and other nonspecific skin eruption    Toxoplasmosis    Unspecified vitamin D deficiency      Past Surgical History:   Procedure Laterality Date    Adenoidectomy  1950    Appendectomy      Appendectomy  1950    Cataract      Colonoscopy  05, 2015    Dereck Marina MD, Kyle English    Knee arthroscopy Left 10/21/16--Dr. Smith    partial medial and lateral meniscectomies    Knee replacement surgery      Knee surgery Left 2019    TKR      196    Other      bladder surgery, removal of calcified lesion    Other surgical history  2011    injection    Other surgical history N/A 1962    Calcium deposit removed from urinary bladder    Other surgical history  10/29/2019    cystoscopy    Skin surgery      BCC - face & R dorsal hand (treated by outside [facility] physician)    Tonsillectomy      w/adenoidectomy    Upper gi endoscopy,biopsy      13 ron       Family History   Problem Relation Age of Onset    Arthritis Mother     Breast Cancer Mother 89    Other (Other) Mother     Cancer Mother     Other (cerebral artery aneurysm) Father     Other (diabetes mellitus) Father     Other (leukemia) Father     Cancer Father     Diabetes Father     Breast Cancer Paternal Aunt       Social History:   Social History     Socioeconomic History    Marital status:    Tobacco Use    Smoking status: Never    Smokeless tobacco: Never   Vaping Use    Vaping status: Never Used   Substance and Sexual Activity    Alcohol use: Yes     Comment: Glass of wine daily    Drug use: No    Sexual activity: Yes     Partners: Male     Comment:    Other Topics Concern     Caffeine Concern Yes    Stress Concern No    Weight Concern No    Special Diet No    Exercise No    Seat Belt Yes     Social Determinants of Health     Financial Resource Strain: Low Risk  (7/8/2024)    Financial Resource Strain     Difficulty of Paying Living Expenses: Not hard at all     Med Affordability: No   Food Insecurity: No Food Insecurity (7/1/2024)    Food Insecurity     Food Insecurity: Never true   Transportation Needs: No Transportation Needs (7/8/2024)    Transportation Needs     Lack of Transportation: No    Received [facility], [facility]    Social Connections   Housing Stability: Low Risk  (7/1/2024)    Housing Stability     Housing Instability: No        Allergies  Allergies   Allergen Reactions    Calcium Acetate OTHER (SEE COMMENTS)     Causes Kidney stones       Review of Systems:   A 10-point review of systems was completed and is negative other than as noted above.    Physical Exam:   LMP  (LMP Unknown)     GENERAL APPEARANCE: well developed, well nourished, in no acute distress  NEUROLOGIC: no localizing neurologic signs, alert and oriented x 3, converses appropriately  HEAD: atraumatic, normocephalic  EYES: sclera non-icteric  ORAL CAVITY: mucosa moist  NECK/THYROID: no obvious masses or goiter  LUNGS: non-labored breathing  EXTREMITIES: warm, well-perfused. No clubbing, cyanosis or edema.  SKIN: no obvious rashes    Results:     Laboratory Data:  Lab Results   Component Value Date    WBC 8.8 06/30/2024    HGB 13.9 06/30/2024    .0 06/30/2024     Lab Results   Component Value Date     07/02/2024    K 4.4 07/02/2024     07/02/2024    CO2 27.0 07/02/2024    BUN 16 07/02/2024    GLU 87 07/02/2024    GFRAA 81 03/18/2021    AST 17 06/30/2024    ALT 22 06/30/2024    TP 6.9 06/30/2024    ALB 3.7 06/30/2024    CA 8.9 07/02/2024       Urinalysis Results (last three years):  Recent Labs     08/22/22  1143 02/09/23  1146 09/15/23  1111  07/01/24  0056 07/26/24  1032   COLORUR Yellow  --  Yellow Light-Yellow Light-Yellow   CLARITY Cloudy*  --  Turbid* Clear Clear   SPECGRAVITY 1.025 1.015 1.015 1.013 1.014   PHURINE 6.0 7.0 7.0 6.0 6.5   PROUR Negative  --  20* Negative Negative   GLUUR Negative  --  Normal Normal Normal   KETUR Negative  --  Negative Trace* Negative   BILUR Negative  --  Negative Negative Negative   BLOODURINE Trace-Intact*  --  1+* Trace* Trace*   NITRITE Positive* Negative 2+* 2+* Negative   UROBILINOGEN 0.2  --  Normal Normal Normal   LEUUR Small*  --  500* 75* 250*   WBCUR 6-10*  6-10*  --  >50* 11-20* 11-20*   RBCUR 0-2  0-2  --  3-5* 3-5* 3-5*   BACUR 3+*  3+*  --  1+* Rare* None Seen       Urine Culture Results (last three years):  Lab Results   Component Value Date    URINECUL  07/26/2024     <10,000 cfu/ml Multiple species present- probable contamination.    URINECUL >100,000 CFU/ML Escherichia coli (A) 07/01/2024    URINECUL >100,000 CFU/ML Escherichia coli (A) 09/15/2023    URINECUL >100,000 CFU/ML Klebsiella pneumoniae (A) 08/22/2022    URINECUL >100,000 CFU/ML Klebsiella pneumoniae (A) 03/18/2021    URINECUL  08/14/2020     <10,000 cfu/ml Mixture of Gram positive organisms isolated - probable contamination.    URINECUL No Growth at 18-24 hrs. 09/24/2019    URINECUL No Growth at 18-24 hrs. 05/30/2018    URINECUL 60,000 CFU/ML Mixed gram positive guero 03/16/2017    URINECUL <10,000 CFU/ML Mixed gram positive guero 06/09/2016       Imaging  No results found.      Impression:     Patient is a 84 year old female with hx of vit D deficiency, B12 deficiency, anxiety on sertraline, HL, dementia, right knee OA, nephrolithiasis, who presents today for consultation from Dr. Bartholomew's office for recurrent UTI.    We reviewed cystitis tx and prevention strategies at today's visit and I provided educational materials for this. Discussed dietery and behavioral issues including cranberry / extract pills / supplements, fluids,  voiding technique, post coital hygiene. Double voiding, bending over after void to completely empty. I encouraged the patient to drink at least 40-60 oz water per day or enough to keep urine clear. I also reviewed dietary irritants and provided educational materials for this.     We discussed that based on her prior stone history and recent admission consideration for repeat upper tract imaging in the form of CT scan to rule out urolithiasis, in particular ureteral stone. Declined by daughter at this time given patient doing well, will consider if increased frequency of UTI.    Patient also with history of microscopic hematuria and abnormal cystoscopy. Last UA +RBC 3-5/hpf. Discussed return for repeat cystoscopy, patient and daughter decline.    For her chronic urinary complaints we also discussed use of estrace cream, this was declined at this time by daughter. Will continue current regimen of medication.     Recommendations:  Initiation of D Mannose 1 gram daily. Increase water intake.   May follow up if increased urinary tract infections or if patient/family desire to pursue any of the above.     Thank you very much for this consult. Please call if there are any questions or concerns.     Kylie Armando PA-C  Urology  [facility]    Date: 8/19/2024

## 2024-08-19 NOTE — PATIENT INSTRUCTIONS
Urinary tract infections can affect your general health and your quality of life.  Some UTI’s can be prevented.  Here are some suggestions that my help prevent frequent UTI’s.     Good Hygiene: Always wipe front to back.  Don’t use perfumed soaps.  Plain soaps like Ivory are the best.  Don’t use washcloths.  Place soap directly on your hands and clean the genital area.  Rinse thoroughly.  Soap can be very irritating to the vaginal mucosa.     Urination: Urinate every 2-3 hours during the day.  Empty your bladder just before going to bed and  first thing when you wake up.  Make sure you go to the bathroom when you have a strong urge. Don't hover over the toilet to urinate.  The bladder may not be completely emptying when using this technique.  Sometimes you may need to \"double-void\".  After you finish urinating, stand up, then sit back down to urinate again.     Clothing: Wear cotton underwear. Change daily.  Avoid tight jeans.       [place]: Sometimes UTI’s are related to intercourse.  Wash genital area before and rinse afterwards.  Empty your bladder before and after intercourse.  Use of vaginal lubricants may be helpful.  Condoms and some lubricants may be irritating to the vaginal mucosa.  Spermicide should be avoided.  Talk to your doctor, you may require a suppressive antibiotic to take after intercourse.     Supplements: Take Vitamin C 500 or 1000mg daily.  Cranberry pills 400mg daily or if symptomatic 400mg two times per day.  Eat yogurt or consider taking a probiotic.  D-mannose 1 gram is another supplement that can help prevent infections.  This one is harder to find, but can be found at stores such as [employer], [employer], or a specialized vitamin store.  Fluid intake should be at least 48oz daily with the goal of 64oz daily.  Water is preferable.      Constipation: Constipation has been linked to UTI’s.  You should be having a soft BM daily.  Dietary fiber should be between 20-25 grams daily.   Flaxseed, All-Bran cereal, Fiber One bars, fruits and vegetables are a good sources of fiber.  Alternatively, Metamucil can be used daily.  Gentle laxatives and stool softeners may be added on a daily or as needed basis if necessary (Miralax, Colace, Pericolace).      Vaginal creams and moisturizers: It may be recommended you try vaginal creams, moisturizers or lubricants as a prevention method.  Over-the-Counter products:  Replens:  1 applicator three times per week  Luvena prebiotic vaginal moisturizer and lubricant:  package of 6.  1 tube every three days at bedtime.  Helps restore pH balance, decrease vaginal dryness, odor and itchiness.  KY liquibeads  KY silk - moisture enhancer  MeAgain Vaginal Moisturizer.  8 per package.  Use 1 daily for 7 days then 1 daily two times per week.  Astroglide  Prescriptions:  Estrace Cream  Premarin Cream  E-string     Prescription medications: Your doctor may recommend daily or as needed prescription medications including antibiotics to prevent urinary tract infection as well.

## 2024-08-30 ENCOUNTER — TELEPHONE (OUTPATIENT)
Dept: PODIATRY CLINIC | Facility: CLINIC | Age: 84
End: 2024-08-30

## 2024-08-30 DIAGNOSIS — M19.072 ARTHROSIS OF MIDFOOT, LEFT: Primary | ICD-10-CM

## 2024-08-30 NOTE — TELEPHONE ENCOUNTER
S/w Patient's daughter- She states that she has seen Dr Sanders for foot pain following older surgery. She was given steroid pack in July that had very good relief for the foot. Douglas states that with the good relief, her mother may have been over exerting the foot. Over the last few weeks, pain has been increased and it is more swollen. She states that she is encourageing her mother to ice/elevate frequently. She states that she it taking Celebrex 200mg daily that we have prescribed her. Per daughter- she just went to PCP and she thought it may be beneficial to try a celebrex BID with smaller dose (100mg) to get better relief throughout the day. Per daughter- nurse practitioner did not want to change anything with the plan of care, but did have that suggestion. She also wanted to let Dr Sanders know that steroid pack worked very well, but she is aware that steroid packs can not be Rx frequently.    Please advise on new Rx of Celebrex for patient

## 2024-08-30 NOTE — TELEPHONE ENCOUNTER
Pt's daughter called. Pt is having hard time walking on the foot. More than normal.  Caller requesting to speak with the nurse.  Please call

## 2024-09-02 RX ORDER — CELECOXIB 100 MG/1
100 CAPSULE ORAL 2 TIMES DAILY
Qty: 30 CAPSULE | Refills: 0 | Status: SHIPPED | OUTPATIENT
Start: 2024-09-02

## 2024-09-11 DIAGNOSIS — M19.072 ARTHROSIS OF MIDFOOT, LEFT: ICD-10-CM

## 2024-09-11 NOTE — TELEPHONE ENCOUNTER
Last office visit 6/7/24  Last rx sent to pharmacy on 9/2/24 for #30 no refill for twice daily use.

## 2024-09-12 RX ORDER — CELECOXIB 100 MG/1
100 CAPSULE ORAL 2 TIMES DAILY
Qty: 30 CAPSULE | Refills: 0 | Status: SHIPPED | OUTPATIENT
Start: 2024-09-12

## 2024-09-15 ENCOUNTER — PATIENT MESSAGE (OUTPATIENT)
Dept: PODIATRY CLINIC | Facility: CLINIC | Age: 84
End: 2024-09-15

## 2024-09-15 DIAGNOSIS — M19.072 ARTHROSIS OF MIDFOOT, LEFT: Primary | ICD-10-CM

## 2024-09-16 NOTE — TELEPHONE ENCOUNTER
From: Melva Mckeon  To: Freddy Sanders  Sent: 9/15/2024 9:22 AM CDT  Subject: Foot    Hello - mom’s foot is really bothering her. Wondered if too soon to repeat the steroid. She is a bit more active so perhaps that is aggravating it. She is icing and elevating it. Appreciate your thoughts. Thank you!     Josy Frye

## 2024-09-17 RX ORDER — CELECOXIB 100 MG/1
100 CAPSULE ORAL 2 TIMES DAILY
Qty: 30 CAPSULE | Refills: 0 | OUTPATIENT
Start: 2024-09-17

## 2024-09-17 NOTE — TELEPHONE ENCOUNTER
I would be okay to add for cortisone injection sometime in the next couple weeks, OK to put on a Monday

## 2024-09-18 RX ORDER — METHYLPREDNISOLONE 4 MG
TABLET, DOSE PACK ORAL
Qty: 21 TABLET | Refills: 0 | Status: SHIPPED | OUTPATIENT
Start: 2024-09-18

## 2024-10-03 ENCOUNTER — TELEPHONE (OUTPATIENT)
Dept: INTERNAL MEDICINE CLINIC | Facility: CLINIC | Age: 84
End: 2024-10-03

## 2024-10-05 NOTE — LETTER
AUTHORIZATION FOR SURGICAL OPERATION OR OTHER PROCEDURE    1.  I hereby authorize Dr. Jesse Altamirano, and [address] [place] [address] staff assigned to my case to perform the following operation and/or procedure at the [address] [place] [address]:    ________________________________ ________ A. M.  P.M.        Patient Name:  ______________________________________________________  (please print)      Patient signature:  ___________________________________________________             Relationship to Patient:           []  Parent    Respon yes

## 2024-10-08 ENCOUNTER — OFFICE VISIT (OUTPATIENT)
Dept: PODIATRY CLINIC | Facility: CLINIC | Age: 84
End: 2024-10-08
Payer: MEDICARE

## 2024-10-08 VITALS — DIASTOLIC BLOOD PRESSURE: 62 MMHG | SYSTOLIC BLOOD PRESSURE: 120 MMHG

## 2024-10-08 DIAGNOSIS — M79.672 FOOT PAIN, LEFT: ICD-10-CM

## 2024-10-08 DIAGNOSIS — M19.072 ARTHROSIS OF MIDFOOT, LEFT: ICD-10-CM

## 2024-10-08 DIAGNOSIS — M19.072 ARTHRITIS OF LEFT MIDFOOT: ICD-10-CM

## 2024-10-08 DIAGNOSIS — R26.81 GAIT INSTABILITY: ICD-10-CM

## 2024-10-08 DIAGNOSIS — M19.072 ARTHROSIS OF MIDFOOT, LEFT: Primary | ICD-10-CM

## 2024-10-08 PROCEDURE — 99214 OFFICE O/P EST MOD 30 MIN: CPT | Performed by: STUDENT IN AN ORGANIZED HEALTH CARE EDUCATION/TRAINING PROGRAM

## 2024-10-08 RX ORDER — DEXAMETHASONE SODIUM PHOSPHATE 4 MG/ML
2 VIAL (ML) INJECTION ONCE
Status: COMPLETED | OUTPATIENT
Start: 2024-10-08 | End: 2024-10-08

## 2024-10-08 NOTE — PROGRESS NOTES
Melva Mckeon is a 84 year old female.   No chief complaint on file.        HPI:     Patient is a pleasant 84-year-old female who presents to clinic with daughter for evaluation of continued left foot pain.  Patient does have history of fourth and fifth TMT J arthroplasty with Dr. Rodriguez.  She relates that last cortisone injection worked well for her for a little while but her pain is starting to return.  She is hoping for another injection at this time.  It has been approximately 4 months since last injection.  She continues to ambulate with supportive shoes and inserts and compression sleeve which are working well for her.  She is interested in more physical therapy.  She also has elongated and thickened nails that she has difficulty trimming at times.  No other complaints are mentioned.     Allergies: Calcium acetate   Current Outpatient Medications   Medication Sig Dispense Refill    methylPREDNISolone 4 MG Oral Tablet Therapy Pack Take as prescribed on pack 21 tablet 0    celecoxib (CELEBREX) 100 MG Oral Cap Take 1 capsule (100 mg total) by mouth 2 (two) times daily. 30 capsule 0    ergocalciferol 1.25 MG (50296 UT) Oral Cap Take 1 capsule (50,000 Units total) by mouth once a week. 12 capsule 0    losartan 25 MG Oral Tab Take 1 tablet (25 mg total) by mouth daily. 90 tablet 3    donepezil 5 MG Oral Tab Take 1 tablet (5 mg total) by mouth nightly. 30 tablet 3    Solifenacin Succinate 10 MG Oral Tab Take 1 tablet (10 mg total) by mouth daily. 90 tablet 3    sertraline 50 MG Oral Tab Take 1 tablet (50 mg total) by mouth daily. 90 tablet 1    cyanocobalamin 1000 MCG Oral Tab Take 1 tablet (1,000 mcg total) by mouth daily.      UREA 20 INTENSIVE HYDRATING 20 % External Cream MIX WITH TRIAMCINOLONE AND APPLY TOPICALLY TO THE AFFECTED AREA EVERY DAY AS DIRECTED.      ibuprofen 200 MG Oral Tab Take 1 tablet (200 mg total) by mouth every 8 (eight) hours as needed for Pain.  0    acetaminophen 500 MG Oral Tab Take 1  tablet (500 mg total) by mouth 3 (three) times daily as needed for Pain.  0    triamcinolone acetonide 0.1 % External Ointment As needed        Past Medical History:    Acute sinusitis, unspecified    Arthritis    Bulging discs    Calculus of kidney    Encounter for screening colonoscopy    Dr.Gonzalo English     Headache(784.0)    Herpes zoster without mention of complication    Lumbago    Myalgia and myositis, unspecified    Personal history of other malignant neoplasm of skin    Hx BCC x2    Rash and other nonspecific skin eruption    Toxoplasmosis    Unspecified vitamin D deficiency      Past Surgical History:   Procedure Laterality Date    Adenoidectomy  1950    Appendectomy      Appendectomy  1950    Cataract      Colonoscopy  05, 2015    Dereck Marina MD, Kyle English    Knee arthroscopy Left 10/21/16--Dr. Smith    partial medial and lateral meniscectomies    Knee replacement surgery      Knee surgery Left 2019    TKR          Other      bladder surgery, removal of calcified lesion    Other surgical history  2011    injection    Other surgical history N/A 1962    Calcium deposit removed from urinary bladder    Other surgical history  10/29/2019    cystoscopy    Skin surgery      BCC - face & R dorsal hand (treated by outside [facility] physician)    Tonsillectomy      w/adenoidectomy    Upper gi endoscopy,biopsy      13 ron       Family History   Problem Relation Age of Onset    Arthritis Mother     Breast Cancer Mother 89    Other (Other) Mother     Cancer Mother     Other (cerebral artery aneurysm) Father     Other (diabetes mellitus) Father     Other (leukemia) Father     Cancer Father     Diabetes Father     Breast Cancer Paternal Aunt       Social History     Socioeconomic History    Marital status:    Tobacco Use    Smoking status: Never    Smokeless tobacco: Never   Vaping Use    Vaping status: Never Used   Substance and Sexual Activity     Alcohol use: Yes     Comment: Glass of wine daily    Drug use: No    Sexual activity: Yes     Partners: Male     Comment:    Other Topics Concern    Caffeine Concern Yes    Stress Concern No    Weight Concern No    Special Diet No    Exercise No    Seat Belt Yes           REVIEW OF SYSTEMS:   Today reviewed systens as documented below  GENERAL HEALTH: feels well otherwise  SKIN: Refer to exam below  RESPIRATORY: denies shortness of breath with exertion  CARDIOVASCULAR: denies chest pain on exertion  GI: denies abdominal pain and denies heartburn  NEURO: denies headaches    EXAM:   LMP  (LMP Unknown)   GENERAL: well developed, well nourished, in no apparent distress  EXTREMITIES:     Derm: Skin is warm and dry.  Well-healed cicatrix to left foot at surgical site.  No ecchymosis or other concerns.  Nails x 10 are elongated and thickened and incurvated.    Vascular: Pedal pulses are palpable.  No edema.  CFT intact to digits.    Neuro: Active and pain sensation intact to digits.    MSK: Pain on palpation noted to fourth and fifth TMT J of left foot at surgical site.  No pain with eversion against resistance.  No pain to peroneal tendons.  Compartments are soft and compressible.  Gait is unstable and antalgic.    ASSESSMENT AND PLAN:   Diagnoses and all orders for this visit:    Arthrosis of midfoot, left  -     dexamethasone (Decadron) 4 MG/ML injection 2 mg  -     triamcinolone acetonide (Kenalog-10) 10 mg/mL injection    Foot pain, left    Gait instability    Arthritis of left midfoot            Plan:     -Patient examined, chart history reviewed.    -Patient has experienced some improvement in function and pain to her foot since last visit.  She should continue ambulate with supportive sock any tennis shoes and avoid walking barefoot.  Also recommend continuing physical therapy and using compression stocking.  -Patient is interested in cortisone injection.  Discussed with patient that we can perform up to 3  of these per year at her surgical site.  She would like to receive cortisone injection today.  It has been 4 months since last injection.  Informed consent was obtained.  After prepping site with alcohol, administered cortisone injection to the left fourth and fifth TMT J's consisting of 1 cc of 0.5% Marcaine plain, 0.5 cc of dexamethasone, and 0.5 cc Kenalog 10.  Patient tolerated injection well and there are no complications.  Site was dressed with Band-Aid.  -Patient will continue to focus on support to her foot and physical therapy.  Updated referral placed for physical therapy.  -Sharply debrided nails x 10 with nail nipper.  Nails further smoothed with a Dremel.    RTC 3 to 4 months.    The patient indicates understanding of these issues and agrees to the plan.    Freddy Sanders DPM

## 2024-10-09 ENCOUNTER — LAB ENCOUNTER (OUTPATIENT)
Dept: LAB | Age: 84
End: 2024-10-09
Attending: NURSE PRACTITIONER
Payer: MEDICARE

## 2024-10-09 ENCOUNTER — PATIENT MESSAGE (OUTPATIENT)
Dept: INTERNAL MEDICINE CLINIC | Facility: CLINIC | Age: 84
End: 2024-10-09

## 2024-10-09 DIAGNOSIS — R41.0 CONFUSION: ICD-10-CM

## 2024-10-09 DIAGNOSIS — N39.0 RECURRENT UTI: ICD-10-CM

## 2024-10-09 DIAGNOSIS — N39.0 RECURRENT UTI: Primary | ICD-10-CM

## 2024-10-09 LAB
BILIRUB UR QL STRIP.AUTO: NEGATIVE
CLARITY UR REFRACT.AUTO: CLEAR
COLOR UR AUTO: COLORLESS
GLUCOSE UR STRIP.AUTO-MCNC: NORMAL MG/DL
KETONES UR STRIP.AUTO-MCNC: NEGATIVE MG/DL
LEUKOCYTE ESTERASE UR QL STRIP.AUTO: NEGATIVE
NITRITE UR QL STRIP.AUTO: NEGATIVE
PH UR STRIP.AUTO: 5.5 [PH] (ref 5–8)
PROT UR STRIP.AUTO-MCNC: NEGATIVE MG/DL
SP GR UR STRIP.AUTO: 1.01 (ref 1–1.03)
UROBILINOGEN UR STRIP.AUTO-MCNC: NORMAL MG/DL

## 2024-10-09 PROCEDURE — 81001 URINALYSIS AUTO W/SCOPE: CPT

## 2024-10-09 RX ORDER — CEPHALEXIN 500 MG/1
500 CAPSULE ORAL 2 TIMES DAILY
Qty: 14 CAPSULE | Refills: 0 | Status: SHIPPED | OUTPATIENT
Start: 2024-10-09

## 2024-10-09 NOTE — TELEPHONE ENCOUNTER
Spoke with daughter   Melva is having more confusion.  Hx of UTI which in the past has caused confusion.    Needs specimen prior to starting antibiotics and her daughter verbalized understanding of this.    Will send keflex to arielle.  She will  the antibiotic but not start until urine specimen has been collected.  Further plan following results.    ER warnings given.     
(0) No drift; leg holds 30 degree position for full 5 secs

## 2024-10-10 ENCOUNTER — NURSE TRIAGE (OUTPATIENT)
Dept: INTERNAL MEDICINE CLINIC | Facility: CLINIC | Age: 84
End: 2024-10-10

## 2024-10-10 NOTE — TELEPHONE ENCOUNTER
Received call from Josy. Per Josy, she was just with pt and she seems slightly improved today. She is more forgetful than confused, remembers some things but forgets others (was able to direct to Dr. Smith's office, but forgets why she is going). Patient is not having any weakness, was able to get in/out of car and up onto exam table at Dr. Smith's office. Temp 97.3. Patient does have upcoming follow-up with Josy Bhat plans to discuss donepezil dosing with him as he told her in the past if this does not help they can adjust dosing. Informed her we will route update to Ruba Jerry and let her know if there is anything further needed at this time. Josy appreciative, she stated we can send her a MC as well as she gets notifications.     Routing to Ruba Jerry to update.

## 2024-10-10 NOTE — TELEPHONE ENCOUNTER
Action Requested: Summary for Provider     []  Critical Lab, Recommendations Needed  [] Need Additional Advice  []   FYI    []   Need Orders  [] Need Medications Sent to Pharmacy  []  Other     SUMMARY: Per Ruba Jerry, pt having acute on chronic confusion, UA negative. Please call pt's daughter to triage.     Called and spoke to pt's daughter, Josy. Josy aware UA neg. Per Josy, when she was with pt on Sunday, she noticed she was a bit more confused than normal. Patient not having any other sxs that Josy is aware of. Still able to get up on her own, is eating/drinking. Patient is going to appointment with Dr. Smith today. Josy will see how she is doing today and call back with an update. Advised to ensure no fever/increase weakness. Josy does not believe pt has either, but will confirm and call back with an update.     Routing to Ruba Jerry to update for now. Josy is going to call back later with an update on how pt is doing today when she takes pt to appointment with Dr. Smith.     Reason for call: Condition Update  Onset: Sunday

## 2024-10-11 ENCOUNTER — TELEPHONE (OUTPATIENT)
Dept: INTERNAL MEDICINE CLINIC | Facility: CLINIC | Age: 84
End: 2024-10-11

## 2024-10-11 DIAGNOSIS — M19.072 ARTHROSIS OF MIDFOOT, LEFT: ICD-10-CM

## 2024-10-11 RX ORDER — CELECOXIB 100 MG/1
100 CAPSULE ORAL 2 TIMES DAILY
Qty: 30 CAPSULE | Refills: 0 | Status: SHIPPED | OUTPATIENT
Start: 2024-10-11

## 2024-10-11 RX ORDER — CELECOXIB 100 MG/1
100 CAPSULE ORAL 2 TIMES DAILY
Qty: 30 CAPSULE | Refills: 0 | OUTPATIENT
Start: 2024-10-11

## 2024-10-11 NOTE — TELEPHONE ENCOUNTER
Patient daughter calling to check on status of refill. See today 10/11 refill request from pharmacy. Please advise.

## 2024-10-11 NOTE — TELEPHONE ENCOUNTER
Patient's daughter calling to check on status of celecoxib refill sent to [facility] pharmacy. Please advise.

## 2024-10-11 NOTE — TELEPHONE ENCOUNTER
Received physical therapy outpatient PT Eval and Plan of Care.   Put in SD bin for review and signature.

## 2024-10-14 ENCOUNTER — PATIENT MESSAGE (OUTPATIENT)
Dept: PODIATRY CLINIC | Facility: CLINIC | Age: 84
End: 2024-10-14

## 2024-10-14 DIAGNOSIS — M19.072 ARTHROSIS OF MIDFOOT, LEFT: Primary | ICD-10-CM

## 2024-10-14 RX ORDER — CELECOXIB 100 MG/1
100 CAPSULE ORAL 2 TIMES DAILY
Qty: 30 CAPSULE | Refills: 2 | Status: SHIPPED | OUTPATIENT
Start: 2024-10-14

## 2024-10-27 ENCOUNTER — PATIENT MESSAGE (OUTPATIENT)
Dept: INTERNAL MEDICINE CLINIC | Facility: CLINIC | Age: 84
End: 2024-10-27

## 2024-11-14 RX ORDER — UREA 20 %
CREAM (GRAM) TOPICAL
Refills: 0 | Status: CANCELLED | OUTPATIENT
Start: 2024-11-14

## 2024-11-14 NOTE — TELEPHONE ENCOUNTER
Called and spoke to pt. Advised her to contact dermatology for refill. She verbalized understanding.

## 2024-11-14 NOTE — TELEPHONE ENCOUNTER
Please review. Protocol Failed; No Protocol    Medication is listed as patient reported.     Requested Prescriptions   Pending Prescriptions Disp Refills    UREA 20 INTENSIVE HYDRATING 20 % External Cream  0       There is no refill protocol information for this order            Future Appointments         Provider Department Appt Notes    In 3 weeks Freddy Sanders DPM [facility] Nail care    In 3 months Freddy Sanders DPM [facility] FU with cortisone shot and nails?    In 5 months Angeles Seymour MD [facility] 6 month follow up Alzheimer's dementia          Recent Outpatient Visits              3 weeks ago Alzheimer's dementia of other onset, without behavioral disturbance, psychotic disturbance, mood disturbance, or anxiety, unspecified dementia severity (HCC)    [facility] Angeles Seymour MD    Office Visit    1 month ago Arthrosis of midfoot, left    [facility] Freddy Sanders DPM    Office Visit    2 months ago Recurrent UTI    [facility], [address] [place] [address] Kylie Fajardo PA-C    Office Visit    3 months ago Routine general medical examination at a health care facility    [facility], [address] [place], [place] [address]Jessy Alas APRN    Office Visit    4 months ago Urinary tract infection without hematuria, site unspecified    [address]    Office Visit

## 2024-11-14 NOTE — TELEPHONE ENCOUNTER
[address] [address]      PATIENT NAME:  GAYATHRI WARD   YOB: 1986   MRN:  [id number]   ATTENDING PHYSICIAN:  Cole Olson M.D.   ROOM:  [id number]   DICTATING PHYSICIAN:  Foster Mcmillan M.D.   UNIT#/ACCOUNT#:  [id number]   DATE OF EXAMINATION:         The EEG shows a normal background rhythm that goes up to 11 hertz.  Initially,   she was having motion artifact on the left hemisphere, predominantly the   anterior part of it.  Then, she was having that on both sides affecting the   tracing, but not determined any change in the predominant rhythm at that   point, that is clearly non-seizure activity.  Photic stimulation did not cause   driving or epileptiform discharges.  There is movement and EMG artifact still   affecting the tracing, but there has been no epileptiform discharges either   focal or generalized or cause by photic stimulation.  The EEG has been mostly   obtained while the patient was awake and drowsy.  There have been no clear   episodes of sleep either stage I or II _____ obtained.  This EEG is normal   while the patient was awake and drowsy.  Further clinical correlation will be   recommended.         Foster Mcmillan M.D.      Author ID:  [id number]   [email or web address] / [place]: [id number] / Job#: [id number]   D: 01/09/2017 15:57:30   T: 01/10/2017 04:34:36         Appears this may be a compounded cream ordered previously by dermatology.  Will need to return to derm if needs compounded

## 2024-11-15 NOTE — TELEPHONE ENCOUNTER
Received call back from pt's daughter, Thad. Informed her this cream was previously filled by derm, thad said she will call [facility] derm for refill.

## 2024-11-26 ENCOUNTER — TELEPHONE (OUTPATIENT)
Dept: INTERNAL MEDICINE CLINIC | Facility: CLINIC | Age: 84
End: 2024-11-26

## 2024-11-27 NOTE — TELEPHONE ENCOUNTER
Received outpatient PT updated Plan of care from [facility] at [facility].  Put in SD bin for review and signature.

## 2024-12-05 ENCOUNTER — MED REC SCAN ONLY (OUTPATIENT)
Dept: INTERNAL MEDICINE CLINIC | Facility: CLINIC | Age: 84
End: 2024-12-05

## 2024-12-07 DIAGNOSIS — M19.072 ARTHROSIS OF MIDFOOT, LEFT: ICD-10-CM

## 2024-12-10 ENCOUNTER — PATIENT MESSAGE (OUTPATIENT)
Dept: PODIATRY CLINIC | Facility: CLINIC | Age: 84
End: 2024-12-10

## 2024-12-11 ENCOUNTER — OFFICE VISIT (OUTPATIENT)
Dept: PODIATRY CLINIC | Facility: CLINIC | Age: 84
End: 2024-12-11
Payer: MEDICARE

## 2024-12-11 VITALS — SYSTOLIC BLOOD PRESSURE: 118 MMHG | DIASTOLIC BLOOD PRESSURE: 66 MMHG

## 2024-12-11 DIAGNOSIS — M79.672 FOOT PAIN, LEFT: ICD-10-CM

## 2024-12-11 DIAGNOSIS — L60.8 INCURVATED NAIL: ICD-10-CM

## 2024-12-11 DIAGNOSIS — R26.81 GAIT INSTABILITY: ICD-10-CM

## 2024-12-11 DIAGNOSIS — B35.1 ONYCHOMYCOSIS: ICD-10-CM

## 2024-12-11 DIAGNOSIS — M19.072 ARTHROSIS OF MIDFOOT, LEFT: Primary | ICD-10-CM

## 2024-12-11 DIAGNOSIS — M19.072 ARTHRITIS OF LEFT MIDFOOT: ICD-10-CM

## 2024-12-11 PROCEDURE — 99214 OFFICE O/P EST MOD 30 MIN: CPT | Performed by: STUDENT IN AN ORGANIZED HEALTH CARE EDUCATION/TRAINING PROGRAM

## 2024-12-11 RX ORDER — DEXAMETHASONE SODIUM PHOSPHATE 4 MG/ML
2 VIAL (ML) INJECTION ONCE
Status: COMPLETED | OUTPATIENT
Start: 2024-12-11 | End: 2024-12-11

## 2024-12-11 RX ORDER — CELECOXIB 100 MG/1
100 CAPSULE ORAL 2 TIMES DAILY
Qty: 30 CAPSULE | Refills: 2 | Status: SHIPPED | OUTPATIENT
Start: 2024-12-11

## 2024-12-11 NOTE — TELEPHONE ENCOUNTER
Rx request, please review and sign off if appropriate. Thank you.    Last seen: 10/08/24  Last refill: 10/14/24 #30 with 2 refills.

## 2024-12-11 NOTE — TELEPHONE ENCOUNTER
Please see message and advise on refill    Last Rx: 10/14/24, #30 (BID), 2 refills  LOV: 10/8/24    Please advise on refill

## 2024-12-11 NOTE — PROGRESS NOTES
Melva Mckeon is a 84 year old female.   Chief Complaint   Patient presents with    Toenail Care     Nail care and foot check- constant ache to feet- pain 4/10 when walking         HPI:     Patient is a pleasant 84-year-old female who presents to clinic with daughter for evaluation of continued left foot pain.  Patient does have history of fourth and fifth TMT J arthroplasty with Dr. Rodriguez.  She relates that last cortisone injection worked well for her for a little while but her pain is starting to return.  She is hoping for another injection at this time.  It has been approximately 4 months since last injection.  She continues to ambulate with supportive shoes and inserts and compression sleeve which are working well for her.  She is interested in more physical therapy.  She also has elongated and thickened nails that she has difficulty trimming at times.  No other complaints are mentioned.     Allergies: Calcium acetate   Current Outpatient Medications   Medication Sig Dispense Refill    donepezil 10 MG Oral Tab Take 1 tablet (10 mg total) by mouth nightly. 90 tablet 3    CELECOXIB 100 MG Oral Cap TAKE ONE CAPSULE BY MOUTH TWICE DAILY 30 capsule 0    methylPREDNISolone 4 MG Oral Tablet Therapy Pack Take as prescribed on pack 21 tablet 0    losartan 25 MG Oral Tab Take 1 tablet (25 mg total) by mouth daily. 90 tablet 3    Solifenacin Succinate 10 MG Oral Tab Take 1 tablet (10 mg total) by mouth daily. 90 tablet 3    sertraline 50 MG Oral Tab Take 1 tablet (50 mg total) by mouth daily. 90 tablet 1    cyanocobalamin 1000 MCG Oral Tab Take 1 tablet (1,000 mcg total) by mouth daily.      UREA 20 INTENSIVE HYDRATING 20 % External Cream MIX WITH TRIAMCINOLONE AND APPLY TOPICALLY TO THE AFFECTED AREA EVERY DAY AS DIRECTED.      ibuprofen 200 MG Oral Tab Take 1 tablet (200 mg total) by mouth every 8 (eight) hours as needed for Pain.  0    acetaminophen 500 MG Oral Tab Take 1 tablet (500 mg total) by mouth 3 (three) times  daily as needed for Pain.  0    triamcinolone acetonide 0.1 % External Ointment As needed      CELECOXIB 100 MG Oral Cap TAKE ONE CAPSULE BY MOUTH TWICE DAILY 30 capsule 2      Past Medical History:    Acute sinusitis, unspecified    Arthritis    Bulging discs    Calculus of kidney    Encounter for screening colonoscopy    Dr.Gonzalo English     Headache(784.0)    Herpes zoster without mention of complication    Lumbago    Myalgia and myositis, unspecified    Personal history of other malignant neoplasm of skin    Hx BCC x2    Rash and other nonspecific skin eruption    Toxoplasmosis    Unspecified vitamin D deficiency      Past Surgical History:   Procedure Laterality Date    Adenoidectomy  1950    Appendectomy      Appendectomy  1950    Cataract      Colonoscopy  05, 2015    Dereck Marina MD, Kyle English    Knee arthroscopy Left 10/21/16--Dr. Smith    partial medial and lateral meniscectomies    Knee replacement surgery      Knee surgery Left 2019    TKR          Other      bladder surgery, removal of calcified lesion    Other surgical history  2011    injection    Other surgical history N/A 1962    Calcium deposit removed from urinary bladder    Other surgical history  10/29/2019    cystoscopy    Skin surgery      BCC - face & R dorsal hand (treated by outside [facility] physician)    Tonsillectomy      w/adenoidectomy    Upper gi endoscopy,biopsy      13 ron       Family History   Problem Relation Age of Onset    Arthritis Mother     Breast Cancer Mother 89    Other (Other) Mother     Cancer Mother     Other (cerebral artery aneurysm) Father     Other (diabetes mellitus) Father     Other (leukemia) Father     Cancer Father     Diabetes Father     Breast Cancer Paternal Aunt       Social History     Socioeconomic History    Marital status:    Tobacco Use    Smoking status: Never    Smokeless tobacco: Never   Vaping Use    Vaping status: Never Used    Substance and Sexual Activity    Alcohol use: Yes     Comment: Glass of wine daily    Drug use: No    Sexual activity: Yes     Partners: Male     Comment:    Other Topics Concern    Caffeine Concern Yes    Stress Concern No    Weight Concern No    Special Diet No    Exercise No    Seat Belt Yes           REVIEW OF SYSTEMS:   Today reviewed systens as documented below  GENERAL HEALTH: feels well otherwise  SKIN: Refer to exam below  RESPIRATORY: denies shortness of breath with exertion  CARDIOVASCULAR: denies chest pain on exertion  GI: denies abdominal pain and denies heartburn  NEURO: denies headaches    EXAM:   LMP  (LMP Unknown)   GENERAL: well developed, well nourished, in no apparent distress  EXTREMITIES:     Derm: Skin is warm and dry.  Well-healed cicatrix to left foot at surgical site.  No ecchymosis or other concerns.  Nails x 10 are elongated and thickened and incurvated.    Vascular: Pedal pulses are palpable.  No edema.  CFT intact to digits.    Neuro: Active and pain sensation intact to digits.    MSK: Pain on palpation noted to fourth and fifth TMT J of left foot at surgical site.  No pain with eversion against resistance.  No pain to peroneal tendons.  Compartments are soft and compressible.  Gait is unstable and antalgic.    ASSESSMENT AND PLAN:   Diagnoses and all orders for this visit:    Arthrosis of midfoot, left  -     dexamethasone (Decadron) 4 MG/ML injection 2 mg  -     triamcinolone acetonide (Kenalog-10) 10 mg/mL injection    Foot pain, left    Gait instability    Arthritis of left midfoot    Onychomycosis    Incurvated nail            Plan:     -Patient examined, chart history reviewed.    -Patient has experienced some improvement in function and pain to her foot since last visit.  She should continue ambulate with supportive sock any tennis shoes and avoid walking barefoot.  Also recommend continuing physical therapy and using compression stocking.  -Patient is interested in  cortisone injection.  Discussed with patient that we can perform up to 3 of these per year at her surgical site.  She would like to receive cortisone injection today.  It has been 4 months since last injection.  Informed consent was obtained.  After prepping site with alcohol, administered cortisone injection to the left fourth and fifth TMT J's consisting of 1 cc of 0.5% Marcaine plain, 0.5 cc of dexamethasone, and 0.5 cc Kenalog 10.  Patient tolerated injection well and there are no complications.  Site was dressed with Band-Aid.  -Sharply debrided nails x 10 with nail nipper.  Nails further smoothed with a Dremel.    RTC 3 to 4 months.    The patient indicates understanding of these issues and agrees to the plan.    Freddy Sanders DPM

## 2024-12-14 ENCOUNTER — MED REC SCAN ONLY (OUTPATIENT)
Dept: INTERNAL MEDICINE CLINIC | Facility: CLINIC | Age: 84
End: 2024-12-14

## 2024-12-18 ENCOUNTER — HOSPITAL ENCOUNTER (OUTPATIENT)
Facility: HOSPITAL | Age: 84
Setting detail: OBSERVATION
Discharge: HOME | End: 2024-12-19
Attending: EMERGENCY MEDICINE | Admitting: HOSPITALIST
Payer: MEDICARE

## 2024-12-18 ENCOUNTER — APPOINTMENT (OUTPATIENT)
Dept: GENERAL RADIOLOGY | Facility: HOSPITAL | Age: 84
End: 2024-12-18
Attending: EMERGENCY MEDICINE
Payer: MEDICARE

## 2024-12-18 ENCOUNTER — APPOINTMENT (OUTPATIENT)
Dept: CV DIAGNOSTICS | Facility: HOSPITAL | Age: 84
End: 2024-12-18
Attending: STUDENT IN AN ORGANIZED HEALTH CARE EDUCATION/TRAINING PROGRAM
Payer: MEDICARE

## 2024-12-18 DIAGNOSIS — R07.9 EXERTIONAL CHEST PAIN: Primary | ICD-10-CM

## 2024-12-18 DIAGNOSIS — F02.A0 MILD ALZHEIMER'S DEMENTIA WITHOUT BEHAVIORAL DISTURBANCE, PSYCHOTIC DISTURBANCE, MOOD DISTURBANCE, OR ANXIETY, UNSPECIFIED TIMING OF DEMENTIA ONSET (HCC): ICD-10-CM

## 2024-12-18 DIAGNOSIS — G30.9 MILD ALZHEIMER'S DEMENTIA WITHOUT BEHAVIORAL DISTURBANCE, PSYCHOTIC DISTURBANCE, MOOD DISTURBANCE, OR ANXIETY, UNSPECIFIED TIMING OF DEMENTIA ONSET (HCC): ICD-10-CM

## 2024-12-18 LAB
ALBUMIN SERPL-MCNC: 4.1 G/DL (ref 3.2–4.8)
ALBUMIN/GLOB SERPL: 1.8 {RATIO} (ref 1–2)
ALP LIVER SERPL-CCNC: 79 U/L
ALT SERPL-CCNC: 10 U/L
ANION GAP SERPL CALC-SCNC: 7 MMOL/L (ref 0–18)
AST SERPL-CCNC: 18 U/L (ref ?–34)
ATRIAL RATE: 66 BPM
BASOPHILS # BLD AUTO: 0.06 X10(3) UL (ref 0–0.2)
BASOPHILS NFR BLD AUTO: 1 %
BILIRUB SERPL-MCNC: 0.5 MG/DL (ref 0.2–1.1)
BUN BLD-MCNC: 17 MG/DL (ref 9–23)
CALCIUM BLD-MCNC: 9.7 MG/DL (ref 8.7–10.4)
CHLORIDE SERPL-SCNC: 107 MMOL/L (ref 98–112)
CO2 SERPL-SCNC: 26 MMOL/L (ref 21–32)
CREAT BLD-MCNC: 0.88 MG/DL
EGFRCR SERPLBLD CKD-EPI 2021: 64 ML/MIN/1.73M2 (ref 60–?)
EOSINOPHIL # BLD AUTO: 0.12 X10(3) UL (ref 0–0.7)
EOSINOPHIL NFR BLD AUTO: 1.9 %
ERYTHROCYTE [DISTWIDTH] IN BLOOD BY AUTOMATED COUNT: 14.5 %
GLOBULIN PLAS-MCNC: 2.3 G/DL (ref 2–3.5)
GLUCOSE BLD-MCNC: 95 MG/DL (ref 70–99)
HCT VFR BLD AUTO: 42.5 %
HGB BLD-MCNC: 13.9 G/DL
IMM GRANULOCYTES # BLD AUTO: 0.02 X10(3) UL (ref 0–1)
IMM GRANULOCYTES NFR BLD: 0.3 %
LYMPHOCYTES # BLD AUTO: 1.97 X10(3) UL (ref 1–4)
LYMPHOCYTES NFR BLD AUTO: 31.2 %
MCH RBC QN AUTO: 30.5 PG (ref 26–34)
MCHC RBC AUTO-ENTMCNC: 32.7 G/DL (ref 31–37)
MCV RBC AUTO: 93.2 FL
MONOCYTES # BLD AUTO: 0.42 X10(3) UL (ref 0.1–1)
MONOCYTES NFR BLD AUTO: 6.7 %
NEUTROPHILS # BLD AUTO: 3.72 X10 (3) UL (ref 1.5–7.7)
NEUTROPHILS # BLD AUTO: 3.72 X10(3) UL (ref 1.5–7.7)
NEUTROPHILS NFR BLD AUTO: 58.9 %
OSMOLALITY SERPL CALC.SUM OF ELEC: 291 MOSM/KG (ref 275–295)
P AXIS: 48 DEGREES
P-R INTERVAL: 146 MS
PLATELET # BLD AUTO: 232 10(3)UL (ref 150–450)
POTASSIUM SERPL-SCNC: 4.5 MMOL/L (ref 3.5–5.1)
PROT SERPL-MCNC: 6.4 G/DL (ref 5.7–8.2)
Q-T INTERVAL: 410 MS
QRS DURATION: 74 MS
QTC CALCULATION (BEZET): 429 MS
R AXIS: -1 DEGREES
RBC # BLD AUTO: 4.56 X10(6)UL
SODIUM SERPL-SCNC: 140 MMOL/L (ref 136–145)
T AXIS: 22 DEGREES
TROPONIN I SERPL HS-MCNC: 5 NG/L
TROPONIN I SERPL HS-MCNC: 6 NG/L
VENTRICULAR RATE: 66 BPM
WBC # BLD AUTO: 6.3 X10(3) UL (ref 4–11)

## 2024-12-18 PROCEDURE — 93306 TTE W/DOPPLER COMPLETE: CPT | Performed by: STUDENT IN AN ORGANIZED HEALTH CARE EDUCATION/TRAINING PROGRAM

## 2024-12-18 PROCEDURE — 71045 X-RAY EXAM CHEST 1 VIEW: CPT | Performed by: EMERGENCY MEDICINE

## 2024-12-18 PROCEDURE — 99223 1ST HOSP IP/OBS HIGH 75: CPT | Performed by: HOSPITALIST

## 2024-12-18 RX ORDER — LOSARTAN POTASSIUM 25 MG/1
25 TABLET ORAL DAILY
Status: DISCONTINUED | OUTPATIENT
Start: 2024-12-19 | End: 2024-12-19

## 2024-12-18 RX ORDER — DONEPEZIL HYDROCHLORIDE 5 MG/1
10 TABLET, FILM COATED ORAL NIGHTLY
Status: DISCONTINUED | OUTPATIENT
Start: 2024-12-18 | End: 2024-12-19

## 2024-12-18 RX ORDER — OXYBUTYNIN CHLORIDE 5 MG/1
10 TABLET, EXTENDED RELEASE ORAL DAILY
Status: DISCONTINUED | OUTPATIENT
Start: 2024-12-19 | End: 2024-12-19

## 2024-12-18 RX ORDER — POLYETHYLENE GLYCOL 3350 17 G/17G
17 POWDER, FOR SOLUTION ORAL DAILY PRN
Status: DISCONTINUED | OUTPATIENT
Start: 2024-12-18 | End: 2024-12-19

## 2024-12-18 RX ORDER — SODIUM PHOSPHATE, DIBASIC AND SODIUM PHOSPHATE, MONOBASIC 7; 19 G/230ML; G/230ML
1 ENEMA RECTAL ONCE AS NEEDED
Status: DISCONTINUED | OUTPATIENT
Start: 2024-12-18 | End: 2024-12-19

## 2024-12-18 RX ORDER — SENNOSIDES 8.6 MG
17.2 TABLET ORAL NIGHTLY PRN
Status: DISCONTINUED | OUTPATIENT
Start: 2024-12-18 | End: 2024-12-19

## 2024-12-18 RX ORDER — ENOXAPARIN SODIUM 100 MG/ML
40 INJECTION SUBCUTANEOUS EVERY EVENING
Status: DISCONTINUED | OUTPATIENT
Start: 2024-12-18 | End: 2024-12-19

## 2024-12-18 RX ORDER — ONDANSETRON 2 MG/ML
4 INJECTION INTRAMUSCULAR; INTRAVENOUS EVERY 6 HOURS PRN
Status: DISCONTINUED | OUTPATIENT
Start: 2024-12-18 | End: 2024-12-19

## 2024-12-18 RX ORDER — BISACODYL 10 MG
10 SUPPOSITORY, RECTAL RECTAL
Status: DISCONTINUED | OUTPATIENT
Start: 2024-12-18 | End: 2024-12-19

## 2024-12-18 RX ORDER — METOCLOPRAMIDE HYDROCHLORIDE 5 MG/ML
5 INJECTION INTRAMUSCULAR; INTRAVENOUS EVERY 8 HOURS PRN
Status: DISCONTINUED | OUTPATIENT
Start: 2024-12-18 | End: 2024-12-19

## 2024-12-18 RX ORDER — CELECOXIB 100 MG/1
100 CAPSULE ORAL 2 TIMES DAILY
Status: DISCONTINUED | OUTPATIENT
Start: 2024-12-18 | End: 2024-12-19

## 2024-12-18 RX ORDER — UREA 200 MG/G
CREAM TOPICAL
COMMUNITY

## 2024-12-18 RX ORDER — ECHINACEA PURPUREA EXTRACT 125 MG
1 TABLET ORAL
Status: DISCONTINUED | OUTPATIENT
Start: 2024-12-18 | End: 2024-12-19

## 2024-12-18 RX ORDER — ACETAMINOPHEN 500 MG
500 TABLET ORAL EVERY 4 HOURS PRN
Status: DISCONTINUED | OUTPATIENT
Start: 2024-12-18 | End: 2024-12-19

## 2024-12-18 NOTE — H&P
[facility]     Melva Mckeon Patient Status:  Emergency    1940 MRN [id number]   Location [facility] EMERGENCY DEPARTMENT Attending Jocelyn Ingram MD   Hosp Day # 0 PCP Jose Alfredo Bartholomew MD     Chief Complaint: chest pain    Subjective:    History of Present Illness:     Melva Mckeon is a 84 year old female with a PMH of dementia, HTN who presents with chest pain.  Patient is a poor historian due to advanced dementia.  She was with a caregiver this morning and was complaining of chest pain.  Patient does not recall other symptoms occurring, denies chest pain at this time.      History/Other:    Past Medical History:  Past Medical History:    Acute sinusitis, unspecified    Arthritis    Bulging discs    Calculus of kidney    Encounter for screening colonoscopy    Dr.Gonzalo English     Headache(784.0)    Herpes zoster without mention of complication    Lumbago    Myalgia and myositis, unspecified    Personal history of other malignant neoplasm of skin    Hx BCC x2    Rash and other nonspecific skin eruption    Toxoplasmosis    Unspecified vitamin D deficiency     Past Surgical History:   Past Surgical History:   Procedure Laterality Date    Adenoidectomy  1950    Appendectomy      Appendectomy  1950    Cataract  2020    Colonoscopy  05, 2015    Dereck Marina MD, Kyle English    Knee arthroscopy Left 10/21/16--Dr. Smith    partial medial and lateral meniscectomies    Knee replacement surgery      Knee surgery Left 2019    TKR          Other      bladder surgery, removal of calcified lesion    Other surgical history  2011    injection    Other surgical history N/A 1962    Calcium deposit removed from urinary bladder    Other surgical history  10/29/2019    cystoscopy    Skin surgery      BCC - face & R dorsal hand (treated by outside [facility] physician)    Tonsillectomy      w/adenoidectomy    Upper gi endoscopy,biopsy      13  ron       Family History:   Family History   Problem Relation Age of Onset    Arthritis Mother     Breast Cancer Mother 89    Other (Other) Mother     Cancer Mother     Other (cerebral artery aneurysm) Father     Other (diabetes mellitus) Father     Other (leukemia) Father     Cancer Father     Diabetes Father     Breast Cancer Paternal Aunt      Social History:    reports that she has never smoked. She has never used smokeless tobacco. She reports current alcohol use. She reports that she does not use drugs.     Allergies: Allergies[1]    Medications:  Medications Ordered Prior to Encounter[2]    Review of Systems:   A comprehensive review of systems was completed.    Pertinent positives and negatives noted in the HPI.    Objective:   Physical Exam:    /64   Pulse 59   Resp 12   Ht 5' 4\" (1.626 m)   LMP  (LMP Unknown)   SpO2 99%   BMI 24.51 kg/m²   General: No acute distress, Alert, pleasant   Respiratory: No rhonchi, no wheezes  Cardiovascular: S1, S2. Regular rate and rhythm  Abdomen: Soft, Non-tender, non-distended, + bowel sounds  Neuro: No new focal deficits  Extremities: No edema    Results:    Labs:      Labs Last 24 Hours:    Recent Labs   Lab 12/18/24  1212   RBC 4.56   HGB 13.9   HCT 42.5   MCV 93.2   MCH 30.5   MCHC 32.7   RDW 14.5   NEPRELIM 3.72   WBC 6.3   .0       Recent Labs   Lab 12/18/24  1213   GLU 95   BUN 17   CREATSERUM 0.88   EGFRCR 64   CA 9.7   ALB 4.1      K 4.5      CO2 26.0   ALKPHO 79   AST 18   ALT 10   BILT 0.5   TP 6.4       Lab Results   Component Value Date    PT 11.9 01/18/2014    INR 0.96 12/31/2015    INR 0.91 01/18/2014       Recent Labs   Lab 12/18/24  1213   TROPHS 5       No results for input(s): \"TROP\", \"PBNP\" in the last 168 hours.    No results for input(s): \"PCT\" in the last 168 hours.    Imaging: Imaging data reviewed in Epic.    Assessment & Plan:      #Chest pain  Unsure of history given dementia  EKG NSR, no acute st changes  CXR  clear  Trop 5  Echo, cards consult  Possible stress test in am  Monitor on tele and trend trops    #Essential HTN  Losartan    #Depression  Zoloft    #Dementia  Aricept    #Urinary incontinence  Ditropan           Plan of care discussed with family, patient, er physician, nurse     Cameron Benavides MD    Supplementary Documentation:     The 21st Century Cures Act makes medical notes like these available to patients in the interest of transparency. Please be advised this is a medical document. Medical documents are intended to carry relevant information, facts as evident, and the clinical opinion of the practitioner. The medical note is intended as peer to peer communication and may appear blunt or direct. It is written in medical language and may contain abbreviations or verbiage that are unfamiliar.                                       [1]   Allergies  Allergen Reactions    Calcium Acetate OTHER (SEE COMMENTS)     Causes Kidney stones   [2]   Current Facility-Administered Medications on File Prior to Encounter   Medication Dose Route Frequency Provider Last Rate Last Admin    [COMPLETED] dexamethasone (Decadron) 4 MG/ML injection 2 mg  2 mg Intramuscular Once    2 mg at 12/11/24 1700    [COMPLETED] triamcinolone acetonide (Kenalog-10) 10 mg/mL injection  5 mg Intramuscular Once    5 mg at 12/11/24 1700    [COMPLETED] dexamethasone (Decadron) 4 MG/ML injection 2 mg  2 mg Intramuscular Once    2 mg at 10/08/24 1334    [COMPLETED] triamcinolone acetonide (Kenalog-10) 10 mg/mL injection  5 mg Intramuscular Once    5 mg at 10/08/24 1334     Current Outpatient Medications on File Prior to Encounter   Medication Sig Dispense Refill    TYLENOL PM EXTRA STRENGTH OR Take 1 tablet by mouth at bedtime.      Urea 20 % External Cream APPLY TO THE AFFECTED AREA(S) TOPICALLY EVERY DAY mixed with triamcinolone      CELECOXIB 100 MG Oral Cap TAKE ONE CAPSULE BY MOUTH TWICE DAILY 30 capsule 2    donepezil 10 MG Oral Tab Take 1 tablet (10  mg total) by mouth nightly. 90 tablet 3    losartan 25 MG Oral Tab Take 1 tablet (25 mg total) by mouth daily. 90 tablet 3    Solifenacin Succinate 10 MG Oral Tab Take 1 tablet (10 mg total) by mouth daily. 90 tablet 3    sertraline 50 MG Oral Tab Take 1 tablet (50 mg total) by mouth daily. 90 tablet 1    cyanocobalamin 1000 MCG Oral Tab Take 1 tablet (1,000 mcg total) by mouth daily.      acetaminophen 500 MG Oral Tab Take 1 tablet (500 mg total) by mouth as needed for Pain.  0    triamcinolone acetonide 0.1 % External Ointment MIX with Urea and apply topically every day to itchy lesions on arms/legs for up to 2 weeks

## 2024-12-18 NOTE — CONSULTS
Cardiology Consultation      Melva Mckeon Patient Status:  Emergency    1940 MRN [id number]   [facility] EMERGENCY DEPARTMENT Attending Jocelyn Ingram MD   Hosp Day # 0 PCP Jose Alfredo Bartholomew MD     Reason for Consultation:  Chest pain    History of Present Illness:  Melva Mckeon is a(n) 84 year old female with chronic medical conditions including Alzheimer's dementia who presents with episode of chest pressure which occurred after she was done ambulating today.  Patient states she is very active at her independent living facility and ambulates on a daily basis.  She states that she has not noted a functional decline over the last few months.  Today's episode occurred after ambulating and after she was noted to be sitting down thereafter.  She had no associated symptoms of lightheadedness, dizziness, syncope, palpitations, shortness of breath.  Patient's daughter is at bedside to corroborate her story.  She also notes that the patient may have received a text message from her granddaughter which may have caused some anxiety.  At this time, patient denies any complaints.  Cardiology asked to evaluate.    History:  Past Medical History:    Acute sinusitis, unspecified    Arthritis    Bulging discs    Calculus of kidney    Encounter for screening colonoscopy    Dr.Gonzalo English     Headache(784.0)    Herpes zoster without mention of complication    Lumbago    Myalgia and myositis, unspecified    Personal history of other malignant neoplasm of skin    Hx BCC x2    Rash and other nonspecific skin eruption    Toxoplasmosis    Unspecified vitamin D deficiency     Past Surgical History:   Procedure Laterality Date    Adenoidectomy  1950    Appendectomy      Appendectomy  1950    Cataract  2020    Colonoscopy  05, 2015    Dereck Marina MD, Kyle English    Knee arthroscopy Left 10/21/16--Dr. Smith    partial medial and lateral meniscectomies    Knee replacement surgery      Knee  surgery Left 2019    TKR          Other      bladder surgery, removal of calcified lesion    Other surgical history  2011    injection    Other surgical history N/A 1962    Calcium deposit removed from urinary bladder    Other surgical history  10/29/2019    cystoscopy    Skin surgery      BCC - face & R dorsal hand (treated by outside [facility] physician)    Tonsillectomy      w/adenoidectomy    Upper gi endoscopy,biopsy      13 pandolfi      Family History   Problem Relation Age of Onset    Arthritis Mother     Breast Cancer Mother 89    Other (Other) Mother     Cancer Mother     Other (cerebral artery aneurysm) Father     Other (diabetes mellitus) Father     Other (leukemia) Father     Cancer Father     Diabetes Father     Breast Cancer Paternal Aunt       reports that she has never smoked. She has never used smokeless tobacco. She reports current alcohol use. She reports that she does not use drugs.    Allergies:  Allergies[1]    Medications:  No current facility-administered medications for this encounter.    Review of Systems:  A comprehensive review of systems was negative if not otherwise mention in above HPI.    /60   Pulse 59   Resp 17   Ht 5' 4\" (1.626 m)   LMP  (LMP Unknown)   SpO2 98%   BMI 24.51 kg/m²   No data recorded.     No intake or output data in the 24 hours ending 24 1341  Wt Readings from Last 3 Encounters:   10/23/24 142 lb 12.8 oz (64.8 kg)   24 135 lb 6.4 oz (61.4 kg)   24 137 lb (62.1 kg)       Physical Exam:   General: Alert and oriented x 3. No apparent distress. No respiratory or constitutional distress.  HEENT: Normocephalic, anicteric sclera, neck supple.  Neck: No JVD  Cardiac: Regular rate and rhythm. S1, S2 normal. No murmur, pericardial rub, S3.  Lungs: Clear without wheezes, rales, rhonchi or dullness.  Normal excursions and effort.  Abdomen: Soft, non-tender. BS-present.  Extremities: Without clubbing, cyanosis or edema.    Neurologic: Alert and oriented, normal affect.  Skin: Warm and dry.     Laboratory Data:  Lab Results   Component Value Date    WBC 6.3 12/18/2024    HGB 13.9 12/18/2024    HCT 42.5 12/18/2024    .0 12/18/2024    CREATSERUM 0.88 12/18/2024    BUN 17 12/18/2024     12/18/2024    K 4.5 12/18/2024     12/18/2024    CO2 26.0 12/18/2024    GLU 95 12/18/2024    CA 9.7 12/18/2024    ALB 4.1 12/18/2024    ALKPHO 79 12/18/2024    BILT 0.5 12/18/2024    TP 6.4 12/18/2024    AST 18 12/18/2024    ALT 10 12/18/2024       Imaging/results:  EKG-NSR, no signs of acute ischemia  Troponin WNL  CXR-negative for acute cardiopulmonary findings      Assessment:  Chest pain-doubt ACS  Alzheimer's dementia   Hypertension      Plan:  Obtain complete echo  Trend troponin  Potential stress evaluation in the future  Further recs to follow        Thank you for allowing me to participate in the care of your patient.      John Ascencio DO  Cardiologist  [facility]  12/18/2024 1:41 PM      Note to the patient: The 21st Century Cures Act makes medical notes like these available to patients in the interest of transparency. However, be advised that this is a medical document. It is intended as peer to peer communication. It is written in medical language and may contain abbreviations or verbiage that are unfamiliar. It may appear blunt or direct. Medical documents are intended to carry relevant information, facts as evident, and clinical opinion of the practitioner.     Disclaimer: Components of this note were documented using voice recognition system and are subject to errors not corrected at proofreading. Contact the author of this note for any clarifications.          [1]   Allergies  Allergen Reactions    Calcium Acetate OTHER (SEE COMMENTS)     Causes Kidney stones

## 2024-12-18 NOTE — ED QUICK NOTES
Orders for admission, patient is aware of plan and ready to go upstairs. Any questions, please call ED RN Hilda at extension [phone].     Patient Covid vaccination status: Fully vaccinated     COVID Test Ordered in ED: None    COVID Suspicion at Admission: N/A    Running Infusions:  None    Mental Status/LOC at time of transport: alert and oriented x 4, forgetful    Other pertinent information:  CIWA score: N/A   NIH score:  N/A

## 2024-12-18 NOTE — ED INITIAL ASSESSMENT (HPI)
Pt in via EMS from home. Pt reports chest pressure since this morning. Pt says she feels a little short of breath. Pt denies any fever/nausea/cough. Pt given 324mg Aspirin en route. Pt says pain feels a little better after aspirin has been given. Right sided chest pain that does not radiate.

## 2024-12-18 NOTE — ED PROVIDER NOTES
Patient Seen in: [facility] Emergency Department      History     Chief Complaint   Patient presents with    Chest Pain Angina     Stated Complaint: Chest pain    Subjective:   HPI      84-year-old female with a history of mild dementia who lives at [facility] in an independent living situation with a caregiver for a few hours each day presents to the emergency department by ambulance after an episode of chest pain this morning.  The patient was pain-free prior to going for a walk in the hallway with her caregiver.  She stated that after she returned to her residence, she developed some chest pain and shortness of breath.  It currently is gone and she is unable to describe the severity of the symptoms or duration.  No prior history of heart disease.  Last stress test was in 2019 per the EMR.  The patient is unable to walk on a treadmill because of chronic left foot pain.    Objective:     Past Medical History:    Acute sinusitis, unspecified    Arthritis    Bulging discs    Calculus of kidney    Encounter for screening colonoscopy    Dr.Gonzalo English     Headache(784.0)    Herpes zoster without mention of complication    Lumbago    Myalgia and myositis, unspecified    Personal history of other malignant neoplasm of skin    Hx BCC x2    Rash and other nonspecific skin eruption    Toxoplasmosis    Unspecified vitamin D deficiency              Past Surgical History:   Procedure Laterality Date    Adenoidectomy  1950    Appendectomy      Appendectomy  1950    Cataract  2020    Colonoscopy  05, 2015    Dereck Marina MD, Kyle English    Knee arthroscopy Left 10/21/16--Dr. Smith    partial medial and lateral meniscectomies    Knee replacement surgery      Knee surgery Left 2019    TKR          Other      bladder surgery, removal of calcified lesion    Other surgical history  2011    injection    Other surgical history N/A 1962    Calcium deposit removed from  urinary bladder    Other surgical history  10/29/2019    cystoscopy    Skin surgery      BCC - face & R dorsal hand (treated by outside [facility] physician)    Tonsillectomy      w/adenoidectomy    Upper gi endoscopy,biopsy      12-30-13 pandolfi                 Social History     Socioeconomic History    Marital status:    Tobacco Use    Smoking status: Never    Smokeless tobacco: Never   Vaping Use    Vaping status: Never Used   Substance and Sexual Activity    Alcohol use: Yes     Comment: Glass of wine daily    Drug use: No    Sexual activity: Yes     Partners: Male     Comment:    Other Topics Concern    Caffeine Concern Yes    Stress Concern No    Weight Concern No    Special Diet No    Exercise No    Seat Belt Yes     Social Drivers of Health     Financial Resource Strain: Low Risk  (7/8/2024)    Financial Resource Strain     Difficulty of Paying Living Expenses: Not hard at all     Med Affordability: No   Food Insecurity: No Food Insecurity (7/1/2024)    Food Insecurity     Food Insecurity: Never true   Transportation Needs: No Transportation Needs (7/8/2024)    Transportation Needs     Lack of Transportation: No    [facility] from [facility]    Social Connections   Housing Stability: Low Risk  (7/1/2024)    Housing Stability     Housing Instability: No                  Physical Exam     ED Triage Vitals [12/18/24 1155]   BP    Pulse 66   Resp 18   Temp    Temp src    SpO2    O2 Device None (Room air)       Current Vitals:   Vital Signs  Pulse: 66  Resp: 18    Oxygen Therapy  O2 Device: None (Room air)        Physical Exam     General Appearance: This is an older female who is awake and conversive sitting on a gurney.  Vital signs were reviewed per nurses notes.  Monitor reveals a sinus rhythm rate in the 60s.  HEENT: Normocephalic/atraumatic.  Anicteric sclera.  Oral mucosa is moist.  Oropharynx is normal.  Neck: No adenopathy or thyromegaly.  Lungs  are clear to auscultation.  Heart exam: Normal S1-S2 without extra sounds or murmurs.  Regular rate and rhythm.  Chest wall is nontender.  Abdomen is soft and nontender without masses or rebound.  Extremities are atraumatic.  Skin is dry without rashes or lesions.  Neuroexam: Alert and oriented x 4.  Speech is fluent.  Moving all 4 extremities well.  ED Course     Labs Reviewed   COMP METABOLIC PANEL (14) - Normal   TROPONIN I HIGH SENSITIVITY - Normal   CBC WITH DIFFERENTIAL WITH PLATELET   RAINBOW DRAW LAVENDER   RAINBOW DRAW LIGHT GREEN   RAINBOW DRAW BLUE     EKG    Rate, intervals and axes as noted on EKG Report.  Rate: 66  Rhythm: Sinus Rhythm  Reading: Normal EKG.  Agree with EKG report.                Intravenous access was obtained.  Laboratory studies were drawn.  Patient was treated with aspirin by medics.    Chest x-ray was ordered and remains pending at the time of dictation.    Case was discussed with [facility] and with [facility] hospitalist Dr. Plascencia.  Patient will be admitted for nuclear stress test.    Treatment plan was discussed with the patient and her daughter who was present in the emergency department.           MDM      #1.  Exertional chest pain.  Preliminary EKG and troponin are normal.  Requires cardiac stress test.  Admitted for the same.  2.  Mild Alzheimer's dementia.    Admission disposition: 12/18/2024 12:30 PM           Medical Decision Making      Disposition and Plan     Clinical Impression:  1. Exertional chest pain    2. Mild Alzheimer's dementia without behavioral disturbance, psychotic disturbance, mood disturbance, or anxiety, unspecified timing of dementia onset (HCC)         Disposition:  Admit  12/18/2024 12:30 pm    Follow-up:  No follow-up provider specified.        Medications Prescribed:  Current Discharge Medication List              Supplementary Documentation:         Hospital Problems       Present on Admission  Date Reviewed: 12/11/2024            ICD-10-CM Noted POA     * (Principal) Exertional chest pain R07.9 12/18/2024 Unknown

## 2024-12-19 ENCOUNTER — APPOINTMENT (OUTPATIENT)
Dept: CV DIAGNOSTICS | Facility: HOSPITAL | Age: 84
End: 2024-12-19
Attending: NURSE PRACTITIONER
Payer: MEDICARE

## 2024-12-19 VITALS
WEIGHT: 139.56 LBS | TEMPERATURE: 98 F | SYSTOLIC BLOOD PRESSURE: 119 MMHG | RESPIRATION RATE: 15 BRPM | HEIGHT: 64 IN | HEART RATE: 62 BPM | OXYGEN SATURATION: 96 % | DIASTOLIC BLOOD PRESSURE: 46 MMHG | BODY MASS INDEX: 23.82 KG/M2

## 2024-12-19 LAB
ANION GAP SERPL CALC-SCNC: 7 MMOL/L (ref 0–18)
BASOPHILS # BLD AUTO: 0.07 X10(3) UL (ref 0–0.2)
BASOPHILS NFR BLD AUTO: 1.3 %
BUN BLD-MCNC: 16 MG/DL (ref 9–23)
CALCIUM BLD-MCNC: 9.2 MG/DL (ref 8.7–10.4)
CHLORIDE SERPL-SCNC: 108 MMOL/L (ref 98–112)
CO2 SERPL-SCNC: 27 MMOL/L (ref 21–32)
CREAT BLD-MCNC: 0.83 MG/DL
EGFRCR SERPLBLD CKD-EPI 2021: 69 ML/MIN/1.73M2 (ref 60–?)
EOSINOPHIL # BLD AUTO: 0.14 X10(3) UL (ref 0–0.7)
EOSINOPHIL NFR BLD AUTO: 2.7 %
ERYTHROCYTE [DISTWIDTH] IN BLOOD BY AUTOMATED COUNT: 14.4 %
GLUCOSE BLD-MCNC: 87 MG/DL (ref 70–99)
HCT VFR BLD AUTO: 38.3 %
HGB BLD-MCNC: 12.4 G/DL
IMM GRANULOCYTES # BLD AUTO: 0.01 X10(3) UL (ref 0–1)
IMM GRANULOCYTES NFR BLD: 0.2 %
LYMPHOCYTES # BLD AUTO: 2.19 X10(3) UL (ref 1–4)
LYMPHOCYTES NFR BLD AUTO: 41.7 %
MAGNESIUM SERPL-MCNC: 2 MG/DL (ref 1.6–2.6)
MCH RBC QN AUTO: 30.2 PG (ref 26–34)
MCHC RBC AUTO-ENTMCNC: 32.4 G/DL (ref 31–37)
MCV RBC AUTO: 93.4 FL
MONOCYTES # BLD AUTO: 0.43 X10(3) UL (ref 0.1–1)
MONOCYTES NFR BLD AUTO: 8.2 %
NEUTROPHILS # BLD AUTO: 2.41 X10 (3) UL (ref 1.5–7.7)
NEUTROPHILS # BLD AUTO: 2.41 X10(3) UL (ref 1.5–7.7)
NEUTROPHILS NFR BLD AUTO: 45.9 %
OSMOLALITY SERPL CALC.SUM OF ELEC: 295 MOSM/KG (ref 275–295)
PLATELET # BLD AUTO: 194 10(3)UL (ref 150–450)
POTASSIUM SERPL-SCNC: 3.9 MMOL/L (ref 3.5–5.1)
RBC # BLD AUTO: 4.1 X10(6)UL
SODIUM SERPL-SCNC: 142 MMOL/L (ref 136–145)
WBC # BLD AUTO: 5.3 X10(3) UL (ref 4–11)

## 2024-12-19 PROCEDURE — 99239 HOSP IP/OBS DSCHRG MGMT >30: CPT | Performed by: HOSPITALIST

## 2024-12-19 PROCEDURE — 93018 CV STRESS TEST I&R ONLY: CPT | Performed by: NURSE PRACTITIONER

## 2024-12-19 PROCEDURE — 93017 CV STRESS TEST TRACING ONLY: CPT | Performed by: NURSE PRACTITIONER

## 2024-12-19 PROCEDURE — 78452 HT MUSCLE IMAGE SPECT MULT: CPT | Performed by: NURSE PRACTITIONER

## 2024-12-19 RX ORDER — REGADENOSON 0.08 MG/ML
INJECTION, SOLUTION INTRAVENOUS
Status: COMPLETED
Start: 2024-12-19 | End: 2024-12-19

## 2024-12-19 NOTE — PLAN OF CARE
Pt chief complaint upon admission: CP & SOB. Received pt at 1930 during handoff. Pt in bed. A&Ox 3, disoriented to time and mildly forgetful. Baseline dementia. RA, lungs clear and diminished. Rhythm NSR on tele. GI/ WNL. No complaint of pain. All medications given as ordered. Pt resting in bed w/ all safety measures in place and call light within reach.     Care completed over shift: Denies any complaint of chest pain. Melatonin given for sleep per pt request.    Plan is for stress test 12/19. Caffeine free diet per protocol.     Problem: PAIN - ADULT  Goal: Verbalizes/displays adequate comfort level or patient's stated pain goal  Description: INTERVENTIONS:  - Encourage pt to monitor pain and request assistance  - Assess pain using appropriate pain scale  - Administer analgesics based on type and severity of pain and evaluate response  - Implement non-pharmacological measures as appropriate and evaluate response  - Consider cultural and social influences on pain and pain management  - Manage/alleviate anxiety  - Utilize distraction and/or relaxation techniques  - Monitor for opioid side effects  - Notify MD/LIP if interventions unsuccessful or patient reports new pain  - Anticipate increased pain with activity and pre-medicate as appropriate  Outcome: Progressing     Problem: RISK FOR INFECTION - ADULT  Goal: Absence of fever/infection during anticipated neutropenic period  Description: INTERVENTIONS  - Monitor WBC  - Administer growth factors as ordered  - Implement neutropenic guidelines  Outcome: Progressing     Problem: SAFETY ADULT - FALL  Goal: Free from fall injury  Description: INTERVENTIONS:  - Assess pt frequently for physical needs  - Identify cognitive and physical deficits and behaviors that affect risk of falls.  - [place] fall precautions as indicated by assessment.  - Educate pt/family on patient safety including physical limitations  - Instruct pt to call for assistance with activity based on  assessment  - Modify environment to reduce risk of injury  - Provide assistive devices as appropriate  - Consider OT/PT consult to assist with strengthening/mobility  - Encourage toileting schedule  Outcome: Progressing     Problem: DISCHARGE PLANNING  Goal: Discharge to home or other facility with appropriate resources  Description: INTERVENTIONS:  - Identify barriers to discharge w/pt and caregiver  - Include patient/family/discharge partner in discharge planning  - Arrange for needed discharge resources and transportation as appropriate  - Identify discharge learning needs (meds, wound care, etc)  - Arrange for interpreters to assist at discharge as needed  - Consider post-discharge preferences of patient/family/discharge partner  - Complete POLST form as appropriate  - Assess patient's ability to be responsible for managing their own health  - Refer to Case Management Department for coordinating discharge planning if the patient needs post-hospital services based on physician/LIP order or complex needs related to functional status, cognitive ability or social support system  Outcome: Progressing     Problem: Altered Communication/Language Barrier  Goal: Patient/Family is able to understand and participate in their care  Description: Interventions:  - Assess communication ability and preferred communication style  - Implement communication aides and strategies  - Use visual cues when possible  - Listen attentively, be patient, do not interrupt  - Minimize distractions  - Allow time for understanding and response  - Establish method for patient to ask for assistance (call light)  - Provide an  as needed  - Communicate barriers and strategies to overcome with those who interact with patient  Outcome: Progressing

## 2024-12-19 NOTE — PLAN OF CARE
Nuclear stress prelim result: no reversible ischemia, EF 83%    No further ischemic workup needed.    Ok to discharge from cardiology perspective        Sahra simmons

## 2024-12-19 NOTE — PLAN OF CARE
Pt. Is alert and oriented times 4. Forgetful at times. Pt. NSR on tele. Denies chest pain or shortness of breath. Continent of bowel and bladder. Up with standby assist and walker. Pt. Updated on plan of care. Call light within reach   Problem: PAIN - ADULT  Goal: Verbalizes/displays adequate comfort level or patient's stated pain goal  Description: INTERVENTIONS:  - Encourage pt to monitor pain and request assistance  - Assess pain using appropriate pain scale  - Administer analgesics based on type and severity of pain and evaluate response  - Implement non-pharmacological measures as appropriate and evaluate response  - Consider cultural and social influences on pain and pain management  - Manage/alleviate anxiety  - Utilize distraction and/or relaxation techniques  - Monitor for opioid side effects  - Notify MD/LIP if interventions unsuccessful or patient reports new pain  - Anticipate increased pain with activity and pre-medicate as appropriate  Outcome: Progressing     Problem: RISK FOR INFECTION - ADULT  Goal: Absence of fever/infection during anticipated neutropenic period  Description: INTERVENTIONS  - Monitor WBC  - Administer growth factors as ordered  - Implement neutropenic guidelines  Outcome: Progressing     Problem: SAFETY ADULT - FALL  Goal: Free from fall injury  Description: INTERVENTIONS:  - Assess pt frequently for physical needs  - Identify cognitive and physical deficits and behaviors that affect risk of falls.  - [place] fall precautions as indicated by assessment.  - Educate pt/family on patient safety including physical limitations  - Instruct pt to call for assistance with activity based on assessment  - Modify environment to reduce risk of injury  - Provide assistive devices as appropriate  - Consider OT/PT consult to assist with strengthening/mobility  - Encourage toileting schedule  Outcome: Progressing     Problem: DISCHARGE PLANNING  Goal: Discharge to home or other facility with  appropriate resources  Description: INTERVENTIONS:  - Identify barriers to discharge w/pt and caregiver  - Include patient/family/discharge partner in discharge planning  - Arrange for needed discharge resources and transportation as appropriate  - Identify discharge learning needs (meds, wound care, etc)  - Arrange for interpreters to assist at discharge as needed  - Consider post-discharge preferences of patient/family/discharge partner  - Complete POLST form as appropriate  - Assess patient's ability to be responsible for managing their own health  - Refer to Case Management Department for coordinating discharge planning if the patient needs post-hospital services based on physician/LIP order or complex needs related to functional status, cognitive ability or social support system  Outcome: Progressing     Problem: Altered Communication/Language Barrier  Goal: Patient/Family is able to understand and participate in their care  Description: Interventions:  - Assess communication ability and preferred communication style  - Implement communication aides and strategies  - Use visual cues when possible  - Listen attentively, be patient, do not interrupt  - Minimize distractions  - Allow time for understanding and response  - Establish method for patient to ask for assistance (call light)  - Provide an  as needed  - Communicate barriers and strategies to overcome with those who interact with patient  Outcome: Progressing

## 2024-12-19 NOTE — PROGRESS NOTES
[facility] Cardiology Progress Note    Melva Mckeon Patient Status:  Observation    1940 MRN [id number]   Location [facility] 8NE-A Attending Cameron Benavides MD   Hosp Day # 0 PCP Jose Alfredo Bartholomew MD     Subjective:  Pt down getting stress test now.   Observed her walking around unit early this am, no distress, no chest pain or observed sob.     Objective:  /48 (BP Location: Right arm)   Pulse 64   Temp 98.1 °F (36.7 °C) (Oral)   Resp 16   Ht 5' 4\" (1.626 m)   Wt 139 lb 8.8 oz (63.3 kg)   LMP  (LMP Unknown)   SpO2 98%   BMI 23.95 kg/m²     Telemetry: NSR, no arythmias       Intake/Output:    Intake/Output Summary (Last 24 hours) at 2024 1126  Last data filed at 2024 1028  Gross per 24 hour   Intake 480 ml   Output 1 ml   Net 479 ml       Last 3 Weights   24 1458 139 lb 8.8 oz (63.3 kg)   24 1444 139 lb 8.8 oz (63.3 kg)   10/23/24 1144 142 lb 12.8 oz (64.8 kg)   24 1337 135 lb 6.4 oz (61.4 kg)       Labs:  Recent Labs   Lab 24  1213 24  0448   GLU 95 87   BUN 17 16   CREATSERUM 0.88 0.83   EGFRCR 64 69   CA 9.7 9.2    142   K 4.5 3.9    108   CO2 26.0 27.0     Recent Labs   Lab 24  1212 24  0448   RBC 4.56 4.10   HGB 13.9 12.4   HCT 42.5 38.3   MCV 93.2 93.4   MCH 30.5 30.2   MCHC 32.7 32.4   RDW 14.5 14.4   NEPRELIM 3.72 2.41   WBC 6.3 5.3   .0 194.0         Recent Labs   Lab 24  1213 24  1734   TROPHS 5 6       Diagnostics:             Physical Exam:    Physical Exam  Pulmonary:      Effort: Pulmonary effort is normal.   Neurological:      Mental Status: She is alert and oriented to person, place, and time.         Pt down getting stress test now.   Observed her walking around unit early this am, no distress, no chest pain or observed sob.     Medications:   donepezil  10 mg Oral Nightly    losartan  25 mg Oral Daily    sertraline  50 mg Oral Daily    oxybutynin ER  10 mg Oral Daily    enoxaparin   40 mg Subcutaneous QPM    celecoxib  100 mg Oral BID         Assessment:  85yo presented with chest pain, after walking in halls at her independent care facility.     Chest pain  Without troponin elevation or EKG changes to suggest ACS  Echo unremarkable   Alzheimer Dementia  HTN  Controlled     Plan:  [place] stress today, home if negative     ANASTACIO Valera  12/19/2024  11:26 AM  Ph [phone] ([place])  Ph [phone] ([place])      Patient seen and examined independently.  Note reviewed and labs reviewed. Agree with above assessment and plan.  84-year-old female presented with episode of chest pain.  Troponin has remained within normal limits and EKG was without signs of acute ischemia.  Given risk factors, will obtain [place] nuclear MPI today.  If WNL, okay to discharge from cardiology perspective with outpatient follow-up.        John Ascencio DO  Cardiologist  [facility]  12/19/2024 2:07 PM      Note to the patient: The 21st Century Cures Act makes medical notes like these available to patients in the interest of transparency. However, be advised that this is a medical document. It is intended as peer to peer communication. It is written in medical language and may contain abbreviations or verbiage that are unfamiliar. It may appear blunt or direct. Medical documents are intended to carry relevant information, facts as evident, and clinical opinion of the practitioner.     Disclaimer: Components of this note were documented using voice recognition system and are subject to errors not corrected at proofreading. Contact the author of this note for any clarifications.

## 2024-12-19 NOTE — PLAN OF CARE
Pt. Is alert and oriented times 4. NSR on tele. Pt. Denies chest pain or shortness of breath. Pt. Is continent of bowel and bladder. Up with standby assist.   Pt. Down for stress test  Pt. Family updated on plan of care. Call light within reach.   Problem: PAIN - ADULT  Goal: Verbalizes/displays adequate comfort level or patient's stated pain goal  Description: INTERVENTIONS:  - Encourage pt to monitor pain and request assistance  - Assess pain using appropriate pain scale  - Administer analgesics based on type and severity of pain and evaluate response  - Implement non-pharmacological measures as appropriate and evaluate response  - Consider cultural and social influences on pain and pain management  - Manage/alleviate anxiety  - Utilize distraction and/or relaxation techniques  - Monitor for opioid side effects  - Notify MD/LIP if interventions unsuccessful or patient reports new pain  - Anticipate increased pain with activity and pre-medicate as appropriate  Outcome: Progressing     Problem: RISK FOR INFECTION - ADULT  Goal: Absence of fever/infection during anticipated neutropenic period  Description: INTERVENTIONS  - Monitor WBC  - Administer growth factors as ordered  - Implement neutropenic guidelines  Outcome: Progressing     Problem: SAFETY ADULT - FALL  Goal: Free from fall injury  Description: INTERVENTIONS:  - Assess pt frequently for physical needs  - Identify cognitive and physical deficits and behaviors that affect risk of falls.  - [place] fall precautions as indicated by assessment.  - Educate pt/family on patient safety including physical limitations  - Instruct pt to call for assistance with activity based on assessment  - Modify environment to reduce risk of injury  - Provide assistive devices as appropriate  - Consider OT/PT consult to assist with strengthening/mobility  - Encourage toileting schedule  Outcome: Progressing     Problem: DISCHARGE PLANNING  Goal: Discharge to home or other  facility with appropriate resources  Description: INTERVENTIONS:  - Identify barriers to discharge w/pt and caregiver  - Include patient/family/discharge partner in discharge planning  - Arrange for needed discharge resources and transportation as appropriate  - Identify discharge learning needs (meds, wound care, etc)  - Arrange for interpreters to assist at discharge as needed  - Consider post-discharge preferences of patient/family/discharge partner  - Complete POLST form as appropriate  - Assess patient's ability to be responsible for managing their own health  - Refer to Case Management Department for coordinating discharge planning if the patient needs post-hospital services based on physician/LIP order or complex needs related to functional status, cognitive ability or social support system  Outcome: Progressing     Problem: Altered Communication/Language Barrier  Goal: Patient/Family is able to understand and participate in their care  Description: Interventions:  - Assess communication ability and preferred communication style  - Implement communication aides and strategies  - Use visual cues when possible  - Listen attentively, be patient, do not interrupt  - Minimize distractions  - Allow time for understanding and response  - Establish method for patient to ask for assistance (call light)  - Provide an  as needed  - Communicate barriers and strategies to overcome with those who interact with patient  Outcome: Progressing

## 2024-12-19 NOTE — DISCHARGE SUMMARY
[facility]     Melva Mckeon Patient Status:  Observation    1940 MRN [id number]   Location [facility] 8NE-A Attending No att. providers found   Hosp Day # 0 PCP Jose Alfredo Bartholomew MD     Date of Admission: 2024  Date of Discharge:  2024     Discharge Disposition: Home or Self Care    Discharge Diagnosis:    #Chest pain  #Essential HTN  #Depression  #Alzheimer's Dementia  #Urinary incontinence    History of Present Illness: Melva Mckeon is a 84 year old female with a PMH of dementia, HTN who presents with chest pain.  Patient is a poor historian due to advanced dementia.  She was with a caregiver this morning and was complaining of chest pain.  Patient does not recall other symptoms occurring, denies chest pain at this time.      Brief Synopsis:   Patient presented with chest pain.  Had cardiac workup including echo and lexiscan which was unremarkable for cardiac cause.  Chest pain resolved.  Discharge home, outpatient f/u with pcp, resume regular home meds.     Lace+ Score: 66  59-90 High Risk  29-58 Medium Risk  0-28   Low Risk       TCM Follow-Up Recommendation:  LACE > 58: High Risk of readmission after discharge from the hospital.      Procedures during hospitalization:   None    Consultants:  Cardiology     Discharge Medication List:     Discharge Medications        CONTINUE taking these medications        Instructions Prescription details   acetaminophen 500 MG Tabs  Commonly known as: Tylenol Extra Strength      Take 1 tablet (500 mg total) by mouth as needed for Pain.   Refills: 0     celecoxib 100 MG Caps  Commonly known as: CeleBREX      TAKE ONE CAPSULE BY MOUTH TWICE DAILY   Quantity: 30 capsule  Refills: 2     cyanocobalamin 1000 MCG Tabs  Commonly known as: Vitamin B12      Take 1 tablet (1,000 mcg total) by mouth daily.   Refills: 0     donepezil 10 MG Tabs  Commonly known as: Aricept      Take 1 tablet (10 mg total) by mouth nightly.   Quantity: 90  tablet  Refills: 3     losartan 25 MG Tabs  Commonly known as: Cozaar      Take 1 tablet (25 mg total) by mouth daily.   Quantity: 90 tablet  Refills: 3     sertraline 50 MG Tabs  Commonly known as: Zoloft  Notes to patient: Tomorrow       Take 1 tablet (50 mg total) by mouth daily.   Quantity: 90 tablet  Refills: 1     Solifenacin Succinate 10 MG Tabs  Commonly known as: VESICARE      Take 1 tablet (10 mg total) by mouth daily.   Quantity: 90 tablet  Refills: 3     triamcinolone 0.1 % Oint  Commonly known as: Kenalog      MIX with Urea and apply topically every day to itchy lesions on arms/legs for up to 2 weeks   Refills: 0     TYLENOL PM EXTRA STRENGTH OR      Take 1 tablet by mouth at bedtime.   Refills: 0     Urea 20 % Crea      APPLY TO THE AFFECTED AREA(S) TOPICALLY EVERY DAY mixed with triamcinolone   Refills: 0              [place]    Follow-up appointment:   Jose Alfredo Bartholomew MD  [address] [place] [address]  [phone]    Schedule an appointment as soon as possible for a visit in 1 week(s)      Appointments for Next 30 Days 2024 - 2025      None            Vital signs:  Temp:  [96.8 °F (36 °C)-98.1 °F (36.7 °C)] 97.8 °F (36.6 °C)  Pulse:  [62-79] 62  Resp:  [15-18] 15  BP: (106-148)/(46-59) 119/46  SpO2:  [96 %-98 %] 96 %    Physical Exam:    General: No acute distress, pleasant   Lungs: clear to auscultation  Cardiovascular: S1, S2, RRR  Abdomen: Soft, NT, ND    -----------------------------------------------------------------------------------------------  PATIENT DISCHARGE INSTRUCTIONS: See electronic chart    Cameron Benavides MD    Total time spent on discharge plannin minutes     The  [place] Cures Act makes medical notes like these available to patients in the interest of transparency. Please be advised this is a medical document. Medical documents are intended to carry relevant information, facts as evident, and the clinical opinion of the  practitioner. The medical note is intended as peer to peer communication and may appear blunt or direct. It is written in medical language and may contain abbreviations or verbiage that are unfamiliar.

## 2024-12-19 NOTE — CM/SW NOTE
12/19/24 1200   CM/SW Referral Data   Referral Source Social Work (self-referral)   Reason for Referral Discharge planning   Informant Daughter   Medical Hx   Does patient have an established PCP? Yes   Patient Info   Patient's Home Environment Independent Living   Patient lives with Other  (caregiver)   Patient Status Prior to Admission   Services in place prior to admission Other (comment)  (part time caregiver 9a-1p)   Discharge Needs   Anticipated D/C needs No anticipated discharge needs       SW spoke with daughter over the phone for discharge planning. Pt is a 85 y/o female who was admitted for chest pain. Pt is due to get a stress test today. Pt resides at [facility] w/ a part time caregiver, Adenike. Adenike comes daily from a 9a-1p. Daughter is very happy with the care at [facility]. Daughter confirms that she'll transport pt at discharge. Confirms PCP is Dr Bartholomew. All questions addressed. Pt does not have any discharge needs at this time. Daughter was appreciative of  phone call.     Sherine PALMER, LSW  Discharge Planner

## 2024-12-19 NOTE — PROGRESS NOTES
12/18/24 1440 12/18/24 1442 12/18/24 1444   Vital Signs   /55 116/73 142/60   MAP (mmHg) 78 82 81   BP Location Right arm Right arm Right arm   BP Method Automatic Automatic Automatic   Patient Position Lying Sitting Standing     Orthos

## 2024-12-20 ENCOUNTER — PATIENT OUTREACH (OUTPATIENT)
Dept: CASE MANAGEMENT | Age: 84
End: 2024-12-20

## 2024-12-20 NOTE — PROGRESS NOTES
LM for pt's dtr, Josy per HIPAA, to call [facility] for TCM since discharge. [facility] phone number was provided for pt to call back.

## 2025-01-15 NOTE — TELEPHONE ENCOUNTER
REFILL PASSED PER [facility] PROTOCOLS    Requested Prescriptions   Pending Prescriptions Disp Refills    SERTRALINE 50 MG Oral Tab [Pharmacy Med Name: sertraline 50 mg tablet] 90 tablet 0     Sig: TAKE ONE TABLET BY MOUTH DAILY       Psychiatric Non-Scheduled (Anti-Anxiety) Passed - 1/15/2025  3:31 PM        Passed - In person appointment or virtual visit in the past 6 mos or appointment in next 3 mos     Recent Outpatient Visits              1 month ago Arthrosis of midfoot, left    [facility] Freddy Sanders DPM    Office Visit    2 months ago Alzheimer's dementia of other onset, without behavioral disturbance, psychotic disturbance, mood disturbance, or anxiety, unspecified dementia severity (HCC)    [facility] Angeles Seymour MD    Office Visit    3 months ago Arthrosis of midfoot, left    [facility] Freddy Sanders DPM    Office Visit    4 months ago Recurrent UTI    [facility], [address] [place], [place] [address]Kylie pace PA-C    Office Visit    5 months ago Routine general medical examination at a health care facility    [facility], [address] [place] [address] Jessy Jerry APRN    Office Visit          Future Appointments         Provider Department Appt Notes    In 2 months Freddy Sanders DPM [facility] FU with cortisone shot and nails?    In 3 months Angeles Seymour MD [facility] 6 month follow up Alzheimer's dementia                    Passed - Depression Screening completed within the past 12 months        Passed - Medication is active on med list             Future Appointments         Provider Department Appt Notes    In 2 months Freddy Sanders DPM [facility]  FU with cortisone shot and nails?    In 3 months Angeles Seymour MD [facility], [facility] 6 month follow up Alzheimer's dementia          Recent Outpatient Visits              1 month ago Arthrosis of midfoot, left    [facility], [facility], Freddy Mendez DPM    Office Visit    2 months ago Alzheimer's dementia of other onset, without behavioral disturbance, psychotic disturbance, mood disturbance, or anxiety, unspecified dementia severity (HCC)    [facility], [facility] Angeles Seymour MD    Office Visit    3 months ago Arthrosis of midfoot, left    [facility], Freddy Mendez DPM    Office Visit    4 months ago Recurrent UTI    [facility], [address] [place], [place] [address]Kylie pace PA-C    Office Visit    5 months ago Routine general medical examination at a health care facility    [facility], [address] [place] [address] Jessy Jerry APRN    Office Visit

## 2025-01-17 ENCOUNTER — TELEPHONE (OUTPATIENT)
Dept: PODIATRY CLINIC | Facility: CLINIC | Age: 85
End: 2025-01-17

## 2025-01-17 DIAGNOSIS — M19.072 ARTHROSIS OF MIDFOOT, LEFT: ICD-10-CM

## 2025-01-17 DIAGNOSIS — M19.072 ARTHROSIS OF MIDFOOT, LEFT: Primary | ICD-10-CM

## 2025-01-17 NOTE — TELEPHONE ENCOUNTER
Pt's daughter called.  Rx. celecoxib, twice a day.  Can the amount be increased.  Send to [facility] pharmacy.  Please call

## 2025-01-21 RX ORDER — CELECOXIB 100 MG/1
100 CAPSULE ORAL 2 TIMES DAILY
Qty: 30 CAPSULE | Refills: 2 | Status: SHIPPED | OUTPATIENT
Start: 2025-01-21

## 2025-01-21 NOTE — TELEPHONE ENCOUNTER
LOV 12/11/24  Last rx: 12/11/24, #30, 2 refills  Pt daughter called asking if can dispense more tablets, pt taking twice daily.    Rx request, please review and sign off if appropriate.

## 2025-01-21 NOTE — TELEPHONE ENCOUNTER
Received another fax from [facility] for a refill of Celecoxib 100. Please advise

## 2025-01-23 RX ORDER — CELECOXIB 100 MG/1
100 CAPSULE ORAL 2 TIMES DAILY
Qty: 30 CAPSULE | Refills: 2 | Status: SHIPPED | OUTPATIENT
Start: 2025-01-23

## 2025-02-12 ENCOUNTER — TELEPHONE (OUTPATIENT)
Dept: SURGERY | Facility: CLINIC | Age: 85
End: 2025-02-12

## 2025-02-12 DIAGNOSIS — N39.0 RECURRENT UTI: Primary | ICD-10-CM

## 2025-02-12 NOTE — TELEPHONE ENCOUNTER
Pt's last OV was on 8/19/24.   Called pt's daughter to discuss below.   Daughter states pt has been more confused, increase in frequency, dysuria, and strong odor to urine.   Caregiver did a at home UTI test, showed positive.     Urine culture ordered.   Recommended to pt's daughter that she proceeds to [facility]/ER if symptoms worsen.   Agreeable to plan.

## 2025-02-12 NOTE — TELEPHONE ENCOUNTER
Per daughter patient was acting strange and her home health nurse did an over the counter uti test that came back positive. Per daughter she is unable to bring her in because she is sick herself and asking if she can bring  in a specimen to be tested to get an antibiotic. Please advise   Detail Level: Detailed Quality 431: Preventive Care And Screening: Unhealthy Alcohol Use - Screening: Patient screened for unhealthy alcohol use using a single question and scores less than 2 times per year Quality 130: Documentation Of Current Medications In The Medical Record: Current Medications Documented Quality 110: Preventive Care And Screening: Influenza Immunization: Influenza Immunization Administered during Influenza season Quality 226: Preventive Care And Screening: Tobacco Use: Screening And Cessation Intervention: Patient screened for tobacco use and is an ex/non-smoker

## 2025-02-13 ENCOUNTER — LAB ENCOUNTER (OUTPATIENT)
Dept: LAB | Age: 85
End: 2025-02-13
Attending: NURSE PRACTITIONER
Payer: MEDICARE

## 2025-02-13 DIAGNOSIS — N39.0 RECURRENT UTI: ICD-10-CM

## 2025-02-13 PROCEDURE — 87186 SC STD MICRODIL/AGAR DIL: CPT

## 2025-02-13 PROCEDURE — 87088 URINE BACTERIA CULTURE: CPT

## 2025-02-13 PROCEDURE — 87086 URINE CULTURE/COLONY COUNT: CPT

## 2025-02-14 ENCOUNTER — TELEPHONE (OUTPATIENT)
Dept: SURGERY | Facility: CLINIC | Age: 85
End: 2025-02-14

## 2025-02-14 RX ORDER — CEPHALEXIN 500 MG/1
500 CAPSULE ORAL 2 TIMES DAILY
Qty: 14 CAPSULE | Refills: 0 | Status: SHIPPED | OUTPATIENT
Start: 2025-02-14 | End: 2025-02-21

## 2025-02-14 NOTE — TELEPHONE ENCOUNTER
Called pt's daughter and relayed below.  Verbalized understanding.  This encounter is completed.

## 2025-02-14 NOTE — TELEPHONE ENCOUNTER
Per daughter dropped off urine sample to test for uti 2/13 and asking if results are in. Please advise

## 2025-02-14 NOTE — TELEPHONE ENCOUNTER
Called pt's daughter to discuss below.   Pt's urine culture is still prelim.   Daughter would like to know if we can start her on an antibiotic due to the weekend.     [facility] [place]: For your review.   (Pharmacy is [facility])    Render Risk Assessment In Note?: no Detail Level: Detailed Additional Notes: Patient will start Spironolactone then increase until patient is at 100mg for 3 months. If acne not improved after that, patient will consider starting Accutane.

## 2025-02-26 ENCOUNTER — TELEPHONE (OUTPATIENT)
Dept: NEUROLOGY | Facility: CLINIC | Age: 85
End: 2025-02-26

## 2025-02-26 ENCOUNTER — LAB REQUISITION (OUTPATIENT)
Dept: LAB | Facility: HOSPITAL | Age: 85
End: 2025-02-26
Payer: MEDICARE

## 2025-02-26 DIAGNOSIS — N39.0 URINARY TRACT INFECTION, SITE NOT SPECIFIED: ICD-10-CM

## 2025-02-26 LAB
BILIRUB UR QL STRIP.AUTO: NEGATIVE
CLARITY UR REFRACT.AUTO: CLEAR
GLUCOSE UR STRIP.AUTO-MCNC: NORMAL MG/DL
KETONES UR STRIP.AUTO-MCNC: NEGATIVE MG/DL
LEUKOCYTE ESTERASE UR QL STRIP.AUTO: NEGATIVE
NITRITE UR QL STRIP.AUTO: NEGATIVE
PH UR STRIP.AUTO: 5.5 [PH] (ref 5–8)
PROT UR STRIP.AUTO-MCNC: NEGATIVE MG/DL
SP GR UR STRIP.AUTO: 1.01 (ref 1–1.03)
UROBILINOGEN UR STRIP.AUTO-MCNC: NORMAL MG/DL

## 2025-02-26 PROCEDURE — 81001 URINALYSIS AUTO W/SCOPE: CPT | Performed by: FAMILY MEDICINE

## 2025-02-26 NOTE — TELEPHONE ENCOUNTER
2/26 Left voicemail stating Provider will be out of office, please call back to reschedule appointment. This is a follow-up appt so they can be scheduled with Lizz if they would like a sooner appt. Please assist with rescheduling when Pt returns call.

## 2025-03-26 ENCOUNTER — LAB REQUISITION (OUTPATIENT)
Dept: LAB | Facility: HOSPITAL | Age: 85
End: 2025-03-26
Payer: MEDICARE

## 2025-03-26 DIAGNOSIS — R30.0 DYSURIA: ICD-10-CM

## 2025-03-26 LAB
BILIRUB UR QL STRIP.AUTO: NEGATIVE
CLARITY UR REFRACT.AUTO: CLEAR
GLUCOSE UR STRIP.AUTO-MCNC: NORMAL MG/DL
KETONES UR STRIP.AUTO-MCNC: NEGATIVE MG/DL
LEUKOCYTE ESTERASE UR QL STRIP.AUTO: NEGATIVE
NITRITE UR QL STRIP.AUTO: NEGATIVE
PH UR STRIP.AUTO: 6.5 [PH] (ref 5–8)
PROT UR STRIP.AUTO-MCNC: NEGATIVE MG/DL
SP GR UR STRIP.AUTO: 1.01 (ref 1–1.03)
UROBILINOGEN UR STRIP.AUTO-MCNC: NORMAL MG/DL

## 2025-03-26 PROCEDURE — 81001 URINALYSIS AUTO W/SCOPE: CPT | Performed by: FAMILY MEDICINE

## 2025-04-09 ENCOUNTER — OFFICE VISIT (OUTPATIENT)
Dept: PODIATRY CLINIC | Facility: CLINIC | Age: 85
End: 2025-04-09
Payer: MEDICARE

## 2025-04-09 VITALS — SYSTOLIC BLOOD PRESSURE: 112 MMHG | HEART RATE: 78 BPM | DIASTOLIC BLOOD PRESSURE: 68 MMHG

## 2025-04-09 DIAGNOSIS — R26.81 GAIT INSTABILITY: ICD-10-CM

## 2025-04-09 DIAGNOSIS — M19.072 ARTHROSIS OF MIDFOOT, LEFT: Primary | ICD-10-CM

## 2025-04-09 DIAGNOSIS — B35.1 ONYCHOMYCOSIS: ICD-10-CM

## 2025-04-09 DIAGNOSIS — L60.8 INCURVATED NAIL: ICD-10-CM

## 2025-04-09 DIAGNOSIS — M79.672 FOOT PAIN, LEFT: ICD-10-CM

## 2025-04-09 DIAGNOSIS — M19.072 ARTHRITIS OF LEFT MIDFOOT: ICD-10-CM

## 2025-04-09 PROCEDURE — 99214 OFFICE O/P EST MOD 30 MIN: CPT | Performed by: STUDENT IN AN ORGANIZED HEALTH CARE EDUCATION/TRAINING PROGRAM

## 2025-04-09 RX ORDER — CELECOXIB 100 MG/1
100 CAPSULE ORAL 2 TIMES DAILY
Qty: 180 CAPSULE | Refills: 0 | Status: SHIPPED | OUTPATIENT
Start: 2025-04-09 | End: 2025-07-08

## 2025-04-09 RX ORDER — TRIAMCINOLONE ACETONIDE 40 MG/ML
20 INJECTION, SUSPENSION INTRA-ARTICULAR; INTRAMUSCULAR ONCE
Status: COMPLETED | OUTPATIENT
Start: 2025-04-09 | End: 2025-04-09

## 2025-04-09 RX ADMIN — TRIAMCINOLONE ACETONIDE 20 MG: 40 INJECTION, SUSPENSION INTRA-ARTICULAR; INTRAMUSCULAR at 13:42:00

## 2025-04-09 NOTE — PROGRESS NOTES
Melva Mckeon is a 84 year old female.   Chief Complaint   Patient presents with    Toenail Care     F/u Toenail Care, LOV with PCP     Foot Pain     F/u left foot pain 5-8/10 worse at the end of the day, She is doing PT for Knee  02/18/2025 LOV  with PCP Vahid Walsh DO         HPI:     Patient is a pleasant 84-year-old female who presents to clinic with daughter for evaluation of continued left foot pain.  Patient does have history of fourth and fifth TMT J arthroplasty with Dr. Rodriguez.  She relates that last cortisone injection worked well for her for a little while but her pain is starting to return.  She is hoping for another injection at this time.  It has been approximately 4 months since last injection.  She continues to ambulate with supportive shoes and inserts and compression sleeve which are working well for her.  She also has elongated and thickened nails that she has difficulty trimming at times.  No other complaints are mentioned.     Allergies: Calcium acetate   Current Outpatient Medications   Medication Sig Dispense Refill    celecoxib (CELEBREX) 100 MG Oral Cap Take 1 capsule (100 mg total) by mouth 2 (two) times daily. 180 capsule 0    celecoxib 100 MG Oral Cap Take 1 capsule (100 mg total) by mouth 2 (two) times daily. 30 capsule 2    CELECOXIB 100 MG Oral Cap TAKE ONE CAPSULE BY MOUTH TWICE DAILY 30 capsule 2    sertraline 50 MG Oral Tab Take 1 tablet (50 mg total) by mouth daily. 90 tablet 3    TYLENOL PM EXTRA STRENGTH OR Take 1 tablet by mouth at bedtime.      Urea 20 % External Cream APPLY TO THE AFFECTED AREA(S) TOPICALLY EVERY DAY mixed with triamcinolone      donepezil 10 MG Oral Tab Take 1 tablet (10 mg total) by mouth nightly. 90 tablet 3    losartan 25 MG Oral Tab Take 1 tablet (25 mg total) by mouth daily. 90 tablet 3    Solifenacin Succinate 10 MG Oral Tab Take 1 tablet (10 mg total) by mouth daily. 90 tablet 3    cyanocobalamin 1000 MCG Oral Tab Take 1 tablet (1,000 mcg  total) by mouth daily.      acetaminophen 500 MG Oral Tab Take 1 tablet (500 mg total) by mouth as needed for Pain.  0    triamcinolone acetonide 0.1 % External Ointment MIX with Urea and apply topically every day to itchy lesions on arms/legs for up to 2 weeks        Past Medical History:    Acute sinusitis, unspecified    Anxiety state    Arthritis    Bulging discs    Calculus of kidney    Cataract    Encounter for screening colonoscopy    Dr.Gonzalo English     Headache(784.0)    Herpes zoster without mention of complication    High cholesterol    Lumbago    Myalgia and myositis, unspecified    Personal history of other malignant neoplasm of skin    Hx BCC x2    Rash and other nonspecific skin eruption    Shortness of breath    Toxoplasmosis    Unspecified vitamin D deficiency    Visual impairment      Past Surgical History:   Procedure Laterality Date    Adenoidectomy  1950    Appendectomy      Appendectomy      Cataract      Colonoscopy  05, 2015    Dereck Marina MD, Kyle English    Knee arthroscopy Left 10/21/16--Dr. Smith    partial medial and lateral meniscectomies    Knee replacement surgery      Knee surgery Left 2019    TKR          Other      bladder surgery, removal of calcified lesion    Other surgical history  2011    injection    Other surgical history N/A 1962    Calcium deposit removed from urinary bladder    Other surgical history  10/29/2019    cystoscopy    Skin surgery      BCC - face & R dorsal hand (treated by outside [facility] physician)    Tonsillectomy      w/adenoidectomy    Upper gi endoscopy,biopsy      13 ron       Family History   Problem Relation Age of Onset    Arthritis Mother     Breast Cancer Mother 89    Other (Other) Mother     Cancer Mother     Other (cerebral artery aneurysm) Father     Other (diabetes mellitus) Father     Other (leukemia) Father     Cancer Father     Diabetes Father     Breast Cancer Paternal Aunt        Social History     Socioeconomic History    Marital status:    Tobacco Use    Smoking status: Never    Smokeless tobacco: Never   Vaping Use    Vaping status: Never Used   Substance and Sexual Activity    Alcohol use: Yes     Comment: Glass of wine daily    Drug use: No    Sexual activity: Yes     Partners: Male     Comment:    Other Topics Concern    Caffeine Concern Yes    Stress Concern No    Weight Concern No    Special Diet No    Exercise No    Seat Belt Yes           REVIEW OF SYSTEMS:     No n/v/f/c.    EXAM:   /68 (BP Location: Right arm, Patient Position: Sitting, Cuff Size: adult)   Pulse 78   LMP  (LMP Unknown)   GENERAL: well developed, well nourished, in no apparent distress  EXTREMITIES:     Derm: Skin is warm and dry.  Well-healed cicatrix to left foot at surgical site.  No ecchymosis or other concerns.  Nails x 10 are elongated and thickened and incurvated.    Vascular: Pedal pulses are palpable.  No edema.  CFT intact to digits.    Neuro: Active and pain sensation intact to digits.    MSK: Pain on palpation noted to fourth and fifth TMT J of left foot at surgical site.  No pain with eversion against resistance.  No pain to peroneal tendons.  Compartments are soft and compressible.  Gait is unstable and antalgic.    ASSESSMENT AND PLAN:   Diagnoses and all orders for this visit:    Arthrosis of midfoot, left  -     triamcinolone acetonide (Kenalog-40) 40 MG/ML injection 20 mg  -     celecoxib (CELEBREX) 100 MG Oral Cap; Take 1 capsule (100 mg total) by mouth 2 (two) times daily.    Foot pain, left    Gait instability    Arthritis of left midfoot    Onychomycosis    Incurvated nail              Plan:     -Patient examined, chart history reviewed.    -Patient has experienced some improvement in function and pain to her foot since last visit.  She should continue ambulate with supportive sock any tennis shoes and avoid walking barefoot.  Also recommend continuing physical therapy  and using compression stocking.  -Patient is interested in cortisone injection.  Discussed with patient that we can perform up to 3 of these per year at her surgical site.  She would like to receive cortisone injection today.  It has been 4 months since last injection.  Informed consent was obtained.  After prepping site with alcohol, administered cortisone injection to the left fourth and fifth TMT J's consisting of 1 cc of 0.5% Marcaine plain, 0.5 cc of dexamethasone, and 0.5 cc Kenalog 10.  Patient tolerated injection well and there are no complications.  Site was dressed with Band-Aid.  -Sharply debrided nails x 10 with nail nipper.  Nails further smoothed with a Dremel.  -Refill placed of celebrex.     RTC 3 to 4 months.    The patient indicates understanding of these issues and agrees to the plan.    Freddy Sanders DPM

## 2025-04-09 NOTE — PROGRESS NOTES
Per Dr. Sanders draw up 0.5ml of 0.5% Marcaine and 0.5ml of Kenalog 40 for injection to left foot.

## 2025-06-21 ENCOUNTER — APPOINTMENT (OUTPATIENT)
Dept: CT IMAGING | Facility: HOSPITAL | Age: 85
End: 2025-06-21
Attending: EMERGENCY MEDICINE
Payer: MEDICARE

## 2025-06-21 PROCEDURE — 70450 CT HEAD/BRAIN W/O DYE: CPT | Performed by: EMERGENCY MEDICINE

## 2025-06-21 PROCEDURE — 72125 CT NECK SPINE W/O DYE: CPT | Performed by: EMERGENCY MEDICINE

## 2025-06-23 ENCOUNTER — PATIENT OUTREACH (OUTPATIENT)
Age: 85
End: 2025-06-23

## 2025-06-23 NOTE — PROGRESS NOTES
25 1320   ENOCH Assessment   Assessment Type ENOCH Initial   Assessment completed with Children   Patient Subjective Spoke with patient's daughter Josy she reports patient  is feeling better today. Josy reports that  reports the following symptoms buttocks pain.  It is not painful enough to take over the counter medication.   Patient denies the following symptoms:  fever, chills, vision changes, headache, dizziness, chest pain, shortness of breath, abdominal pain, nausea or vomiting. No medications prescribed at discharge.  Remaining medications reviewed.  Daughter reports she did ask patient not to drink any alcohol containing beverages for at least one week.  Patient did agree.  Daughter attempted to conference patient in on call but she did not answer as she is most likely taking a nap as her caregiver just left.  Daughter reports patient is seeing an on site doctor @ [facility] as this is more convenient.  Daughter requests that primary care physician not be changed at this time.  She will reach out to nurse and doctor @ [facility] and have them follow up with patient.    Patient ambulates with walker.  SDOH not completed as nurse navigator did not speak with patient.  Daughter was instructed to take patient to emergency room or call 911 if she begins experiencing:  (x)  Chest pain     (x)  Shortness of breath     (x)  Worsening pain      Daughter did not have any additional questions or concerns.   Chief Complaint Alcoholic intoxication without complication, . Fall   ENOCH Navigation Initial Assessment   Verify patient name and  with patient/ caregiver Yes   Tell me what you understand of why you were in the hospital or emergency department fall   Prior to leaving the hospital were your Discharge Instructions reviewed with you? Yes   Did you receive a copy of your written Discharge Instructions? Yes   Do you feel better or worse since you left the hospital or emergency department?  Better   Do you have a follow-up appointment? No   Were you able to schedule the appt? Not Scheduled   Are there any barriers to getting to your follow-up appointment? No   Prior to leaving the hospital was Home Health (HH) arranged for you? N/A   Are HH needs identified by staff during the assessment? No   Prior to leaving the hospital or emergency department was Durable Medical Equipment (DME), medical supplies, or infusions arranged for you? N/A   Are DME/medical supply/infusions needs identified by staff during this assessment? No   Did any of your medications change, during or after your hospital stay or ED visit? No   Do you understand what your medications are for and possible side effects? Yes   Are there any reasons that keep you from taking your medication as prescribed? No   Were you given a different diet per your Discharge Instructions? No   Reason n/a   Do you have any questions or concerns that have not been discussed? No     Transitions of Care Navigation    Nursing Interventions:  Assessed for additional falls and for other signs/symptoms.  Reviewed medications. Instructed when to return to emergency room. Encouraged appointment with primary care physician and/or specialist.      Medications:  Medication Reconciliation:  I am aware of an inpatient discharge within the last 30 days.  The discharge medication list has been reconciled with the patient's current medication list and reviewed by me. See medication list for additions of new medication, and changes to current doses of medications and discontinued medications.  Current Medications[1]      Follow-up Appointments:  Your appointments       Date & Time Appointment Department ([place])    Aug 06, 2025 1:00 PM CDT Exam - Established with Freddy Sanders DPM [facility] ([place] [address])        Nov 12, 2025 1:20 PM CST Follow Up Visit with Angeles Seymour MD [facility]  [facility] ([facility])              [facility] [place]  [address]  [facility] [address] [phone] [facility] [address]  [address]  [facility] [address] [phone]            Transitional Care Clinic  Was TCC Ordered: No      Primary Care Provider (If no TCC appointment)  Does patient already have a PCP appointment scheduled? No  Nurse Care Manager Attempted to schedule PCP office ER Follow-up appointment with patient   -If no appointment scheduled: Explain daughter would like on site MD @ [facility] to see patient.  Telephone encounter sent to primary care physician clinical team regarding this.        Specialist  Does the patient have any other follow-up appointment(s) need to be scheduled? No     [x]  Daughter verbally agrees to additional follow-up calls from Nurse Care Manager    Book By Date: 6/28/25         [1]   Current Outpatient Medications   Medication Sig Dispense Refill    memantine 5 MG Oral Tab Take 1 tablet (5 mg total) by mouth 2 (two) times daily. 60 tablet 5    celecoxib (CELEBREX) 100 MG Oral Cap Take 1 capsule (100 mg total) by mouth 2 (two) times daily. 180 capsule 0    sertraline 50 MG Oral Tab Take 1 tablet (50 mg total) by mouth daily. 90 tablet 3    TYLENOL PM EXTRA STRENGTH OR Take 1 tablet by mouth at bedtime.      Urea 20 % External Cream APPLY TO THE AFFECTED AREA(S) TOPICALLY EVERY DAY mixed with triamcinolone      donepezil 10 MG Oral Tab Take 1 tablet (10 mg total) by mouth nightly. 90 tablet 3    losartan 25 MG Oral Tab Take 1 tablet (25 mg total) by mouth daily. 90 tablet 3    Solifenacin Succinate 10 MG Oral Tab Take 1 tablet (10 mg total) by mouth daily. 90 tablet 3    cyanocobalamin 1000 MCG Oral Tab Take 1 tablet (1,000 mcg total) by mouth in the morning.      acetaminophen  500 MG Oral Tab Take 1 tablet (500 mg total) by mouth as needed for Pain.  0    triamcinolone acetonide 0.1 % External Ointment MIX with Urea and apply topically every day to itchy lesions on arms/legs for up to 2 weeks      celecoxib 100 MG Oral Cap Take 1 capsule (100 mg total) by mouth 2 (two) times daily. 30 capsule 2    CELECOXIB 100 MG Oral Cap TAKE ONE CAPSULE BY MOUTH TWICE DAILY 30 capsule 2

## 2025-07-01 ENCOUNTER — PATIENT OUTREACH (OUTPATIENT)
Age: 85
End: 2025-07-01

## 2025-07-01 NOTE — PROGRESS NOTES
Outreach # 1   This writer contacted patient'S daughter Josy at mobile number  for ENOCH Navigation post-hospital discharge follow up call on 7/1/2025 at 3:35 PM. There was no answer and I was unable to reach the patient at this time. I left a nondescript message with my contact information and the reason for my call on voicemail.

## 2025-07-02 NOTE — PROGRESS NOTES
Outreach #2   This writer contacted patient's daughter at mobile number  for ENOCH Navigation post-hospital discharge follow up call on 7/2/2025 at 3:38 PM. There was no answer and I was unable to reach the patient at this time. I left a nondescript message with my contact information and the reason for my call on voicemail.

## 2025-07-03 NOTE — PROGRESS NOTES
Attempted to contact patient for 7 day Transitions of Care navigation follow up call. .  No return call after 2 calls.  TRANSITIONS OF CARE closed.

## 2025-07-09 ENCOUNTER — LAB REQUISITION (OUTPATIENT)
Dept: LAB | Facility: HOSPITAL | Age: 85
End: 2025-07-09
Payer: MEDICARE

## 2025-07-09 DIAGNOSIS — M19.072 ARTHROSIS OF MIDFOOT, LEFT: ICD-10-CM

## 2025-07-09 DIAGNOSIS — R35.89 OTHER POLYURIA: ICD-10-CM

## 2025-07-09 LAB
BILIRUB UR QL STRIP.AUTO: NEGATIVE
CLARITY UR REFRACT.AUTO: CLEAR
GLUCOSE UR STRIP.AUTO-MCNC: NORMAL MG/DL
KETONES UR STRIP.AUTO-MCNC: NEGATIVE MG/DL
LEUKOCYTE ESTERASE UR QL STRIP.AUTO: NEGATIVE
NITRITE UR QL STRIP.AUTO: NEGATIVE
PH UR STRIP.AUTO: 5.5 [PH] (ref 5–8)
PROT UR STRIP.AUTO-MCNC: NEGATIVE MG/DL
RBC UR QL AUTO: NEGATIVE
SP GR UR STRIP.AUTO: 1.01 (ref 1–1.03)
UROBILINOGEN UR STRIP.AUTO-MCNC: NORMAL MG/DL

## 2025-07-09 PROCEDURE — 81003 URINALYSIS AUTO W/O SCOPE: CPT | Performed by: FAMILY MEDICINE

## 2025-07-12 RX ORDER — CELECOXIB 100 MG/1
100 CAPSULE ORAL 2 TIMES DAILY
Qty: 180 CAPSULE | Refills: 0 | Status: SHIPPED | OUTPATIENT
Start: 2025-07-12

## 2025-08-06 ENCOUNTER — OFFICE VISIT (OUTPATIENT)
Dept: PODIATRY CLINIC | Facility: CLINIC | Age: 85
End: 2025-08-06

## 2025-08-06 VITALS — SYSTOLIC BLOOD PRESSURE: 108 MMHG | DIASTOLIC BLOOD PRESSURE: 62 MMHG

## 2025-08-06 DIAGNOSIS — B35.1 ONYCHOMYCOSIS: ICD-10-CM

## 2025-08-06 DIAGNOSIS — R26.81 GAIT INSTABILITY: ICD-10-CM

## 2025-08-06 DIAGNOSIS — M79.672 FOOT PAIN, LEFT: ICD-10-CM

## 2025-08-06 DIAGNOSIS — M19.072 ARTHRITIS OF LEFT MIDFOOT: ICD-10-CM

## 2025-08-06 DIAGNOSIS — L60.8 INCURVATED NAIL: ICD-10-CM

## 2025-08-06 DIAGNOSIS — M19.072 ARTHROSIS OF MIDFOOT, LEFT: Primary | ICD-10-CM

## 2025-08-06 PROCEDURE — 99214 OFFICE O/P EST MOD 30 MIN: CPT | Performed by: STUDENT IN AN ORGANIZED HEALTH CARE EDUCATION/TRAINING PROGRAM

## 2025-08-06 RX ORDER — TRIAMCINOLONE ACETONIDE 40 MG/ML
20 INJECTION, SUSPENSION INTRA-ARTICULAR; INTRAMUSCULAR ONCE
Status: COMPLETED | OUTPATIENT
Start: 2025-08-06 | End: 2025-08-06

## 2025-08-27 ENCOUNTER — LAB REQUISITION (OUTPATIENT)
Dept: LAB | Facility: HOSPITAL | Age: 85
End: 2025-08-27

## 2025-08-27 DIAGNOSIS — E78.5 HYPERLIPIDEMIA, UNSPECIFIED: ICD-10-CM

## 2025-08-27 LAB
CHOLEST SERPL-MCNC: 293 MG/DL (ref ?–200)
FASTING PATIENT LIPID ANSWER: YES
HDLC SERPL-MCNC: 99 MG/DL (ref 40–59)
LDLC SERPL CALC-MCNC: 182 MG/DL (ref ?–100)
NONHDLC SERPL-MCNC: 194 MG/DL (ref ?–130)
TRIGL SERPL-MCNC: 79 MG/DL (ref 30–149)
VLDLC SERPL CALC-MCNC: 16 MG/DL (ref 0–30)

## 2025-08-27 PROCEDURE — 80061 LIPID PANEL: CPT | Performed by: FAMILY MEDICINE

## (undated) DIAGNOSIS — M19.072 ARTHROSIS OF MIDFOOT, LEFT: Primary | ICD-10-CM

## (undated) NOTE — LETTER
12/02/19        [address]      Dear Eliz Kamara,    [facility] records indicate that you have outstanding lab work and or testing that was ordered for you and has not yet been completed:  Orders Placed This Encounter      MR

## (undated) NOTE — LETTER
AUTHORIZATION FOR SURGICAL OPERATION OR OTHER PROCEDURE    1. I hereby [facility] Dr. Say Garrison, [facility] staff assigned to my case to perform the following operation and/or procedure at the [facility], [facility]:    _______________________________________________________________________________________________    Cortisone injection Left foot  _______________________________________________________________________________________________    2. My physician has explained the nature and purpose of the operation or other procedure, possible alternative methods of treatment, the risks involved, and the possibility of complication to me. I acknowledge that no guarantee has been made as to the result that may be obtained. 3.  I recognize that, during the course of this operation, or other procedure, unforseen conditions may necessitate additional or different procedure than those listed above. I, therefore, further authorize and request that the above named physician, his/her physician assistants or designees perform such procedures as are, in his/her professional opinion, necessary and desirable. 4.  Any tissue or organs removed in the operation or other procedure may be disposed of by and at the discretion of the [facility], [facility] and [facility]. 5.  I understand that in the event of a medical emergency, I will be transported by local paramedics to [facility] or other hospital emergency department. 6.  I certify that I have read and fully understand the above consent to operation and/or other procedure. 7.  I acknowledge that my physician has explained sedation/analgesia administration to me including the risks and benefits. I consent to the administration of sedation/analgesia as may be necessary or desirable in the judgement of my physician.     Witness signature: ___________________________________________________ Date:  ______/______/_____ Time:  ________ A. M.  P.M. Patient Name:  ______________________________________________________  (please print)      Patient signature:  ___________________________________________________             Relationship to Patient:           []  Parent    Responsible person                          []  Spouse  In case of minor or                    [] Other  _____________   Incompetent name:  __________________________________________________                               (please print)      _____________      Responsible person  In case of minor or  Incompetent signature:  _______________________________________________    Statement of Physician  My signature below affirms that prior to the time of the procedure, I have explained to the patient and/or his/her guardian, the risks and benefits involved in the proposed treatment and any reasonable alternative to the proposed treatment. I have also explained the risks and benefits involved in the refusal of the proposed treatment and have answered the patient's questions.                         Date:  ______/______/_______  Provider                      Signature:  __________________________________________________________       Time:  ___________ A.M    P.M.

## (undated) NOTE — LETTER
AUTHORIZATION FOR SURGICAL OPERATION OR OTHER PROCEDURE    1. I hereby authorize Dr. Freddy Sanders, and [facility] staff assigned to my case to perform the following operation and/or procedure at the [facility] site:    _______________________________________________________________________________________________    Cortisone Injection Left Foot  _______________________________________________________________________________________________    2.  My physician has explained the nature and purpose of the operation or other procedure, possible alternative methods of treatment, the risks involved, and the possibility of complication to me.  I acknowledge that no guarantee has been made as to the result that may be obtained.  3.  I recognize that, during the course of this operation, or other procedure, unforseen conditions may necessitate additional or different procedure than those listed above.  I, therefore, further authorize and request that the above named physician, his/her physician assistants or designees perform such procedures as are, in his/her professional opinion, necessary and desirable.  4.  Any tissue or organs removed in the operation or other procedure may be disposed of by and at the discretion of the [facility] and [facility].  5.  I understand that in the event of a medical emergency, I will be transported by local paramedics to [facility] or other hospital emergency department.  6.  I certify that I have read and fully understand the above consent to operation and/or other procedure.    7.  I acknowledge that my physician has explained sedation/analgesia administration to me including the risks and benefits.  I consent to the administration of sedation/analgesia as may be necessary or desirable in the judgement of my physician.    Witness signature: ___________________________________________________ Date:   ______/______/_____                    Time:  ________ A.M.  P.M.       Patient Name:  ______________________________________________________  (please print)      Patient signature:  ___________________________________________________             Relationship to Patient:           []  Parent    Responsible person                          []  Spouse  In case of minor or                    [] Other  _____________   Incompetent name:  __________________________________________________                               (please print)      _____________      Responsible person  In case of minor or  Incompetent signature:  _______________________________________________    Statement of Physician  My signature below affirms that prior to the time of the procedure, I have explained to the patient and/or his/her guardian, the risks and benefits involved in the proposed treatment and any reasonable alternative to the proposed treatment.  I have also explained the risks and benefits involved in the refusal of the proposed treatment and have answered the patient's questions.                        Date:  ______/______/_______  Provider                      Signature:  __________________________________________________________       Time:  ___________ A.M    P.M.

## (undated) NOTE — LETTER
AUTHORIZATION FOR SURGICAL OPERATION OR OTHER PROCEDURE    1.  I hereby authorize Dr. Lesa Leslie, and [facility] staff assigned to my case to perform the following operation and/or procedure at the [facility], [facility]:    Cortisone injection left foot___ Time:  ________ A. M.  P.M.        Patient Name:  ______________________________________________________  (please print)      Patient signature:  ___________________________________________________             Relationship to Patient:

## (undated) NOTE — Clinical Note
Transitional Care Management call completed. A Transitional Care Management appointment is scheduled for 7/16/2024. Thank you.

## (undated) NOTE — MR AVS SNAPSHOT
[facility] [address] [phone]               Thank you for choosing us for your health care visit with MAMTA Cole.   We are glad to serve you and happy to provide you with this summar Acute pain of left shoulder        Fall, initial encounter        Metallic taste          Instructions and Information about Your Health     None      Allergies as of Mar 06, 2017     Other     CALCIUM                 Today's Vital Signs     BP Pulse Temp Educational Information     Your blood pressure indicates you may be at-risk for Hypertension. Please consider the following Lifestyle Modifications. Also, please return for a follow-up Blood Pressure Check in 1 year.      Lifestyle Modification Recom

## (undated) NOTE — Clinical Note
03/10/2017        [address]  [address] [facility] [address]      Dear Shai Granado,    [facility] records indicate that you have outstanding lab work and or testing that was ordered for you and has not yet been completed:      LIPID PANEL  Comp Metabolic

## (undated) NOTE — ED AVS SNAPSHOT
Jaguar Aiken   MRN: [id number]    Department:  [facility] Emergency Department   Date of Visit:  7/12/2019           Disclosure     Insurance plans vary and the physician(s) referred by the ER may not be covered by your plan.  Please contact your tell this physician (or your personal doctor if your instructions are to return to your personal doctor) about any new or lasting problems. The primary care or specialist physician will see patients referred from the [facility] Emergency Department.  Arvind Amador

## (undated) NOTE — LETTER
AUTHORIZATION FOR SURGICAL OPERATION OR OTHER PROCEDURE    1.  I hereby authorize Dr. Lelia Abrams, and [facility] staff assigned to my case to perform the following operation and/or procedure at the [facility]:    ________________________________ Time:  ________ A. M.  P.M.        Patient Name:  ______________________________________________________  (please print)      Patient signature:  ___________________________________________________             Relationship to Patient:

## (undated) NOTE — LETTER
AUTHORIZATION FOR SURGICAL OPERATION OR OTHER PROCEDURE    1.  I hereby authorize Dr. Jose Alejandro Armendariz, and [facility] staff assigned to my case to perform the following operation and/or procedure at the [facility]:    ________________________________ Time:  ________ A. M.  P.M.        Patient Name:  ______________________________________________________  (please print)      Patient signature:  ___________________________________________________             Relationship to Patient:           []  Parent

## (undated) NOTE — LETTER
AUTHORIZATION FOR SURGICAL OPERATION OR OTHER PROCEDURE    1. I hereby authorize Dr. Freddy Sanders, and [facility] staff assigned to my case to perform the following operation and/or procedure at the [facility] site:    _______________________________________________________________________________________________    Cortisone injection Left Foot  _______________________________________________________________________________________________    2.  My physician has explained the nature and purpose of the operation or other procedure, possible alternative methods of treatment, the risks involved, and the possibility of complication to me.  I acknowledge that no guarantee has been made as to the result that may be obtained.  3.  I recognize that, during the course of this operation, or other procedure, unforseen conditions may necessitate additional or different procedure than those listed above.  I, therefore, further authorize and request that the above named physician, his/her physician assistants or designees perform such procedures as are, in his/her professional opinion, necessary and desirable.  4.  Any tissue or organs removed in the operation or other procedure may be disposed of by and at the discretion of the [facility] and [facility].  5.  I understand that in the event of a medical emergency, I will be transported by local paramedics to [facility] or other hospital emergency department.  6.  I certify that I have read and fully understand the above consent to operation and/or other procedure.    7.  I acknowledge that my physician has explained sedation/analgesia administration to me including the risks and benefits.  I consent to the administration of sedation/analgesia as may be necessary or desirable in the judgement of my physician.    Witness signature: ___________________________________________________ Date:   ______/______/_____                    Time:  ________ A.M.  P.M.       Patient Name:  ______________________________________________________  (please print)      Patient signature:  ___________________________________________________             Relationship to Patient:           []  Parent    Responsible person                          []  Spouse  In case of minor or                    [] Other  _____________   Incompetent name:  __________________________________________________                               (please print)      _____________      Responsible person  In case of minor or  Incompetent signature:  _______________________________________________    Statement of Physician  My signature below affirms that prior to the time of the procedure, I have explained to the patient and/or his/her guardian, the risks and benefits involved in the proposed treatment and any reasonable alternative to the proposed treatment.  I have also explained the risks and benefits involved in the refusal of the proposed treatment and have answered the patient's questions.                        Date:  ______/______/_______  Provider                      Signature:  __________________________________________________________       Time:  ___________ A.M    P.M.

## (undated) NOTE — LETTER
08/18/21        [address] [id number]      Dear Sherren Roles,    [facility] records indicate that you have outstanding lab work and or testing that was ordered for you and has not yet been completed:  Orders Placed This Encounter

## (undated) NOTE — LETTER
07/01/19        [address]      Dear Jase Co,    [address] [facility] records indicate that you have outstanding lab work and or testing that was ordered for you and has not yet been completed:  Orders Placed This Encounter

## (undated) NOTE — LETTER
Patient Name: Irene Gould  YOB: 1940          MRN number:  [id number]  Date:  1/25/2018  Referring Physician:  Elza Lambert     Discharge Summary    Pt has attended 11 visits in Physical Therapy.      Assessment: Pt has progressed well with PT Plan: Discharge from PT to HEP       Patient was advised of these findings, precautions, and treatment options and has agreed to actively participate in planning and for this course of care.     Thank you for your referral. If you have any questions, please

## (undated) NOTE — LETTER
AUTHORIZATION FOR SURGICAL OPERATION OR OTHER PROCEDURE    1.  I hereby [facility]. Brisa Baker, and [facility] staff assigned to my case to perform the following operation and/or procedure at the [facility]:    ________________________________ Time:  ________ A. M.  P.M.        Patient Name:  ______________________________________________________  (please print)      Patient signature:  ___________________________________________________             Relationship to Patient:

## (undated) NOTE — LETTER
AUTHORIZATION FOR SURGICAL OPERATION OR OTHER PROCEDURE    1.  I hereby authorize Dr. Millie Rutherford, and [facility] staff assigned to my case to perform the following operation and/or procedure at the [facility]:    ________________________________ ________ A. M.  P.M.        Patient Name:  ______________________________________________________  (please print)      Patient signature:  ___________________________________________________             Relationship to Patient:           []  Parent    Respon

## (undated) NOTE — LETTER
AUTHORIZATION FOR SURGICAL OPERATION OR OTHER PROCEDURE    1.  I hereby authorize , and [facility] staff assigned to my case to perform the following operation and/or procedure at the [facility]:    _________________________________ ________ A. M.  P.M.        Patient Name:  ______________________________________________________  (please print)      Patient signature:  ___________________________________________________             Relationship to Patient:           []  Parent    Respon

## (undated) NOTE — MR AVS SNAPSHOT
[facility] [address] [address] [phone]               Thank you for choosing us for your health care visit with [facility].   We are glad to serve you and happy to provide you with this summar HbgA1C    At Least  Annually for Diabetics HGBA1C (%)   Date Value   11/14/2013 5.5    No flowsheet data found.     Fasting Blood Sugar (FSB)   Patient must be diagnosed with one of the following:   • Hypertension   • Dyslipidemia   • Obesity (BMI ³30 kg/m this or any previous visit. No flowsheet data found. Fecal Occult Blood   Covered Annually No results found for: FOB, OCCULTSTOOL No flowsheet data found.      Barium Enema-   uncomfortable but covered  Covered but uncomfortable   Glaucoma Screening No orders found for this or any previous visit.  Medium/high risk factors:   End-stage renal disease   Hemophiliacs who received Factor VIII or IX concentrates   Clients of institutions for the mentally retarded   Persons who live in the same house as a He APPLY 1 APPLICATION TOPICALLY 2 (TWO) TIMES DAILY AS NEEDED. Commonly known as:  LOTRISONE           Sertraline HCl 50 MG Tabs   TAKE 1 TABLET (50 MG TOTAL) BY MOUTH DAILY.    Commonly known as:  ZOLOFT           Vitamin B-12 1000 MCG Tabs   Take 1,000 mc Instructions: To schedule an appointment for your radiology test please call Padmin[facility] Russo [facility] Scheduling at [phone]. Wednesday February 08, 2017     Imaging:  XR DEXA BONE DENSITOMETRY ([id number])    Instructions:   To schedule an appoint You can access your MyChart to more actively manage your health care and view more details from this visit by going to [email or web address].   If you've recently had a stay at the Hospital you can access your discharge instructions in [address] by maria elena chair/bench and a hand held shower head while bathing/showering. BEDROOM:  ? Place light switches within reach of your bed and a night light between the bedroom and bathroom. ? Get up slowly from lying down or sitting if you get dizzy. ?  Keep a working

## (undated) NOTE — LETTER
AUTHORIZATION FOR SURGICAL OPERATION OR OTHER PROCEDURE    1.  I hereby authorize Dr. Jesse Altamirano, and [address] [place] [address] staff assigned to my case to perform the following operation and/or procedure at the [address] [place] [address]:    ________________________________ Time:  ________ A. M.  P.M.        Patient Name:  ______________________________________________________  (please print)      Patient signature:  ___________________________________________________             Relationship to Patient:

## (undated) NOTE — LETTER
AUTHORIZATION FOR SURGICAL OPERATION OR OTHER PROCEDURE    1. I hereby authorize Dr. Jeanmarie Hoyos, and [address] [place] [address] staff assigned to my case to perform the following operation and/or procedure at the [address] [place] [address]:      _____________________________________________________________________________________________    Cortisone Injection Left Foot  _______________________________________________________________________________________________    2. My physician has explained the nature and purpose of the operation or other procedure, possible alternative methods of treatment, the risks involved, and the possibility of complication to me. I acknowledge that no guarantee has been made as to the result that may be obtained. 3.  I recognize that, during the course of this operation, or other procedure, unforseen conditions may necessitate additional or different procedure than those listed above. I, therefore, further authorize and request that the above named physician, his/her physician assistants or designees perform such procedures as are, in his/her professional opinion, necessary and desirable. 4.  Any tissue or organs removed in the operation or other procedure may be disposed of by and at the discretion of the [address] [place] [address] and [facility]. 5.  I understand that in the event of a medical emergency, I will be transported by local paramedics to [facility] or other hospital emergency department. 6.  I certify that I have read and fully understand the above consent to operation and/or other procedure. 7.  I acknowledge that my physician has explained sedation/analgesia administration to me including the risks and benefits. I consent to the administration of sedation/analgesia as may be necessary or desirable in the judgement of my physician.     Witness signature: ___________________________________________________ Date:  ______/______/_____                    Time: ________ A. M.  P.M. Patient Name:  ______________________________________________________  (please print)      Patient signature:  ___________________________________________________             Relationship to Patient:           []  Parent    Responsible person                          []  Spouse  In case of minor or                    [] Other  _____________   Incompetent name:  __________________________________________________                               (please print)      _____________      Responsible person  In case of minor or  Incompetent signature:  _______________________________________________    Statement of Physician  My signature below affirms that prior to the time of the procedure, I have explained to the patient and/or his/her guardian, the risks and benefits involved in the proposed treatment and any reasonable alternative to the proposed treatment. I have also explained the risks and benefits involved in the refusal of the proposed treatment and have answered the patient's questions.                         Date:  ______/______/_______  Provider                      Signature:  __________________________________________________________       Time:  ___________ A.M    P.M.

## (undated) NOTE — LETTER
AUTHORIZATION FOR SURGICAL OPERATION OR OTHER PROCEDURE    1. I hereby authorize Dr. Freddy Sanders, and [facility] staff assigned to my case to perform the following operation and/or procedure at the [facility] site:    _______________________________________________________________________________________________    Cortisone Injection left Foot  _______________________________________________________________________________________________    2.  My physician has explained the nature and purpose of the operation or other procedure, possible alternative methods of treatment, the risks involved, and the possibility of complication to me.  I acknowledge that no guarantee has been made as to the result that may be obtained.  3.  I recognize that, during the course of this operation, or other procedure, unforseen conditions may necessitate additional or different procedure than those listed above.  I, therefore, further authorize and request that the above named physician, his/her physician assistants or designees perform such procedures as are, in his/her professional opinion, necessary and desirable.  4.  Any tissue or organs removed in the operation or other procedure may be disposed of by and at the discretion of the [facility] and [facility].  5.  I understand that in the event of a medical emergency, I will be transported by local paramedics to [facility] or other hospital emergency department.  6.  I certify that I have read and fully understand the above consent to operation and/or other procedure.    7.  I acknowledge that my physician has explained sedation/analgesia administration to me including the risks and benefits.  I consent to the administration of sedation/analgesia as may be necessary or desirable in the judgement of my physician.    Witness signature: ___________________________________________________ Date:   ______/______/_____                    Time:  ________ A.M.  P.M.       Patient Name:  ______________________________________________________  (please print)      Patient signature:  ___________________________________________________             Relationship to Patient:           []  Parent    Responsible person                          []  Spouse  In case of minor or                    [] Other  _____________   Incompetent name:  __________________________________________________                               (please print)      _____________      Responsible person  In case of minor or  Incompetent signature:  _______________________________________________    Statement of Physician  My signature below affirms that prior to the time of the procedure, I have explained to the patient and/or his/her guardian, the risks and benefits involved in the proposed treatment and any reasonable alternative to the proposed treatment.  I have also explained the risks and benefits involved in the refusal of the proposed treatment and have answered the patient's questions.                        Date:  ______/______/_______  Provider                      Signature:  __________________________________________________________       Time:  ___________ A.M    P.M.

## (undated) NOTE — LETTER
AUTHORIZATION FOR SURGICAL OPERATION OR OTHER PROCEDURE    1. I hereby authorize Dr. Freddy Sanders, and [facility] staff assigned to my case to perform the following operation and/or procedure at the [facility]:    _______________________________________________________________________________________________    Cortisone injection left foot   _______________________________________________________________________________________________    2.  My physician has explained the nature and purpose of the operation or other procedure, possible alternative methods of treatment, the risks involved, and the possibility of complication to me.  I acknowledge that no guarantee has been made as to the result that may be obtained.  3.  I recognize that, during the course of this operation, or other procedure, unforseen conditions may necessitate additional or different procedure than those listed above.  I, therefore, further authorize and request that the above named physician, his/her physician assistants or designees perform such procedures as are, in his/her professional opinion, necessary and desirable.  4.  Any tissue or organs removed in the operation or other procedure may be disposed of by and at the discretion of the [facility] and [facility].  5.  I understand that in the event of a medical emergency, I will be transported by local paramedics to [facility] or other hospital emergency department.  6.  I certify that I have read and fully understand the above consent to operation and/or other procedure.    7.  I acknowledge that my physician has explained sedation/analgesia administration to me including the risks and benefits.  I consent to the administration of sedation/analgesia as may be necessary or desirable in the judgement of my physician.    Witness signature: ___________________________________________________ Date:  ______/______/_____                     Time:  ________ A.M.  P.M.       Patient Name:  ______________________________________________________  (please print)      Patient signature:  ___________________________________________________             Relationship to Patient:           []  Parent    Responsible person                          []  Spouse  In case of minor or                    [] Other  _____________   Incompetent name:  __________________________________________________                               (please print)      _____________      Responsible person  In case of minor or  Incompetent signature:  _______________________________________________    Statement of Physician  My signature below affirms that prior to the time of the procedure, I have explained to the patient and/or his/her guardian, the risks and benefits involved in the proposed treatment and any reasonable alternative to the proposed treatment.  I have also explained the risks and benefits involved in the refusal of the proposed treatment and have answered the patient's questions.                        Date:  ______/______/_______  Provider                      Signature:  __________________________________________________________       Time:  ___________ A.M    P.M.

## (undated) NOTE — LETTER
AUTHORIZATION FOR SURGICAL OPERATION OR OTHER PROCEDURE    1.  I hereby authorize Dr. Jose Alejandro Armendariz, and [facility] staff assigned to my case to perform the following operation and/or procedure at the [facility]:    ________________________________ Time:  ________ A. M.  P.M.        Patient Name:  ______________________________________________________  (please print)      Patient signature:  ___________________________________________________             Relationship to Patient:

## (undated) NOTE — LETTER
AUTHORIZATION FOR SURGICAL OPERATION OR OTHER PROCEDURE    1. I hereby [facility] Dr. Reyna Spencer, and [facility] staff assigned to my case to perform the following operation and/or procedure at the [facility]:    _______________________________________________________________________________________________    Cortisone Injection Left Foot  _______________________________________________________________________________________________    2. My physician has explained the nature and purpose of the operation or other procedure, possible alternative methods of treatment, the risks involved, and the possibility of complication to me. I acknowledge that no guarantee has been made as to the result that may be obtained. 3.  I recognize that, during the course of this operation, or other procedure, unforseen conditions may necessitate additional or different procedure than those listed above. I, therefore, further authorize and request that the above named physician, his/her physician assistants or designees perform such procedures as are, in his/her professional opinion, necessary and desirable. 4.  Any tissue or organs removed in the operation or other procedure may be disposed of by and at the discretion of the [facility] and [facility]. 5.  I understand that in the event of a medical emergency, I will be transported by local paramedics to [facility] or other hospital emergency department. 6.  I certify that I have read and fully understand the above consent to operation and/or other procedure. 7.  I acknowledge that my physician has explained sedation/analgesia administration to me including the risks and benefits. I consent to the administration of sedation/analgesia as may be necessary or desirable in the judgement of my physician.     Witness signature: ___________________________________________________ Date:  ______/______/_____                    Time: ________ A. M.  P.M. Patient Name:  ______________________________________________________  (please print)      Patient signature:  ___________________________________________________             Relationship to Patient:           []  Parent    Responsible person                          []  Spouse  In case of minor or                    [] Other  _____________   Incompetent name:  __________________________________________________                               (please print)      _____________      Responsible person  In case of minor or  Incompetent signature:  _______________________________________________    Statement of Physician  My signature below affirms that prior to the time of the procedure, I have explained to the patient and/or his/her guardian, the risks and benefits involved in the proposed treatment and any reasonable alternative to the proposed treatment. I have also explained the risks and benefits involved in the refusal of the proposed treatment and have answered the patient's questions.                         Date:  ______/______/_______  Provider                      Signature:  __________________________________________________________       Time:  ___________ A.M    P.M.

## (undated) NOTE — LETTER
AUTHORIZATION FOR SURGICAL OPERATION OR OTHER PROCEDURE    1.  I hereby authorize Dr. Clau West, and [facility] staff assigned to my case to perform the following operation and/or procedure at the [facility]:    ________________________________ Time:  ________ A. M.  P.M.        Patient Name:  ______________________________________________________  (please print)      Patient signature:  ___________________________________________________             Relationship to Patient:           []  Parent

## (undated) NOTE — LETTER
AUTHORIZATION FOR SURGICAL OPERATION OR OTHER PROCEDURE    1. I hereby [facility] Dr. Sammuel Heimlich, [facility] staff assigned to my case to perform the following operation and/or procedure at the [facility], [facility]:    _______________________________________________________________________________________________    Cortisone Injection Left Foot  _______________________________________________________________________________________________    2. My physician has explained the nature and purpose of the operation or other procedure, possible alternative methods of treatment, the risks involved, and the possibility of complication to me. I acknowledge that no guarantee has been made as to the result that may be obtained. 3.  I recognize that, during the course of this operation, or other procedure, unforseen conditions may necessitate additional or different procedure than those listed above. I, therefore, further authorize and request that the above named physician, his/her physician assistants or designees perform such procedures as are, in his/her professional opinion, necessary and desirable. 4.  Any tissue or organs removed in the operation or other procedure may be disposed of by and at the discretion of the [facility], [facility] and [facility]. 5.  I understand that in the event of a medical emergency, I will be transported by local paramedics to [facility] or other hospital emergency department. 6.  I certify that I have read and fully understand the above consent to operation and/or other procedure. 7.  I acknowledge that my physician has explained sedation/analgesia administration to me including the risks and benefits. I consent to the administration of sedation/analgesia as may be necessary or desirable in the judgement of my physician.     Witness signature: ___________________________________________________ Date:  ______/______/_____                    Time: ________ A. M.  P.M. Patient Name:  ______________________________________________________  (please print)      Patient signature:  ___________________________________________________             Relationship to Patient:           []  Parent    Responsible person                          []  Spouse  In case of minor or                    [] Other  _____________   Incompetent name:  __________________________________________________                               (please print)      _____________      Responsible person  In case of minor or  Incompetent signature:  _______________________________________________    Statement of Physician  My signature below affirms that prior to the time of the procedure, I have explained to the patient and/or his/her guardian, the risks and benefits involved in the proposed treatment and any reasonable alternative to the proposed treatment. I have also explained the risks and benefits involved in the refusal of the proposed treatment and have answered the patient's questions.                         Date:  ______/______/_______  Provider                      Signature:  __________________________________________________________       Time:  ___________ A.M    P.M.

## (undated) NOTE — LETTER
09/18/21        [address] [facility] [id number]      Dear Katy Florentino,    [facility] records indicate that you have outstanding lab work and or testing that was ordered for you and has not yet been completed:  Orders Placed This Encounter

## (undated) NOTE — LETTER
AUTHORIZATION FOR SURGICAL OPERATION OR OTHER PROCEDURE    1. I hereby authorize Dr. Zuleika Kaplan, and [address] [place] [address] staff assigned to my case to perform the following operation and/or procedure at the [address] [place] [address]:    _______________________________________________________________________________________________    Cortisone injection left foot  _______________________________________________________________________________________________    2. My physician has explained the nature and purpose of the operation or other procedure, possible alternative methods of treatment, the risks involved, and the possibility of complication to me. I acknowledge that no guarantee has been made as to the result that may be obtained. 3.  I recognize that, during the course of this operation, or other procedure, unforseen conditions may necessitate additional or different procedure than those listed above. I, therefore, further authorize and request that the above named physician, his/her physician assistants or designees perform such procedures as are, in his/her professional opinion, necessary and desirable. 4.  Any tissue or organs removed in the operation or other procedure may be disposed of by and at the discretion of the [address] [place] [address] and [address]. 5.  I understand that in the event of a medical emergency, I will be transported by local paramedics to [facility] or other [facility] emergency department. 6.  I certify that I have read and fully understand the above consent to operation and/or other procedure. 7.  I acknowledge that my physician has explained sedation/analgesia administration to me including the risks and benefits. I consent to the administration of sedation/analgesia as may be necessary or desirable in the judgement of my physician.     Witness signature: ___________________________________________________ Date:  ______/______/_____                    Time: ________ A. M.  P.M. Patient Name:  ______________________________________________________  (please print)      Patient signature:  ___________________________________________________             Relationship to Patient:           []  Parent    Responsible person                          []  Spouse  In case of minor or                    [] Other  _____________   Incompetent name:  __________________________________________________                               (please print)      _____________      Responsible person  In case of minor or  Incompetent signature:  _______________________________________________    Statement of Physician  My signature below affirms that prior to the time of the procedure, I have explained to the patient and/or his/her guardian, the risks and benefits involved in the proposed treatment and any reasonable alternative to the proposed treatment. I have also explained the risks and benefits involved in the refusal of the proposed treatment and have answered the patient's questions.                         Date:  ______/______/_______  Provider                      Signature:  __________________________________________________________       Time:  ___________ A.M    P.M.

## (undated) NOTE — LETTER
AUTHORIZATION FOR SURGICAL OPERATION OR OTHER PROCEDURE    1.  I hereby authorize Dr. Magnus Olszewski, and [address] [place] [address] staff assigned to my case to perform the following operation and/or procedure at the [address] [place] [address]:    ________________________________ ________ A. M.  P.M.        Patient Name:  ______________________________________________________  (please print)      Patient signature:  ___________________________________________________             Relationship to Patient:           []  Parent    Respon

## (undated) NOTE — LETTER
AUTHORIZATION FOR SURGICAL OPERATION OR OTHER PROCEDURE    1. I hereby authorize Dr. Freddy Sanders, and [facility] staff assigned to my case to perform the following operation and/or procedure at the [facility] site:    _______________________________________________________________________________________________     left foot cortisone injection   _______________________________________________________________________________________________    2.  My physician has explained the nature and purpose of the operation or other procedure, possible alternative methods of treatment, the risks involved, and the possibility of complication to me.  I acknowledge that no guarantee has been made as to the result that may be obtained.  3.  I recognize that, during the course of this operation, or other procedure, unforseen conditions may necessitate additional or different procedure than those listed above.  I, therefore, further authorize and request that the above named physician, his/her physician assistants or designees perform such procedures as are, in his/her professional opinion, necessary and desirable.  4.  Any tissue or organs removed in the operation or other procedure may be disposed of by and at the discretion of the [facility].  5.  I understand that in the event of a medical emergency, I will be transported by local paramedics to [facility] or other hospital emergency department.  6.  I certify that I have read and fully understand the above consent to operation and/or other procedure.    7.  I acknowledge that my physician has explained sedation/analgesia administration to me including the risks and benefits.  I consent to the administration of sedation/analgesia as may be necessary or desirable in the judgement of my physician.    Witness signature: ___________________________________________________ Date:   ______/______/_____                    Time:  ________ A.M.  P.M.       Patient Name:  ______________________________________________________  (please print)      Patient signature:  ___________________________________________________             Relationship to Patient:           []  Parent    Responsible person                          []  Spouse  In case of minor or                    [] Other  _____________   Incompetent name:  __________________________________________________                               (please print)      _____________      Responsible person  In case of minor or  Incompetent signature:  _______________________________________________    Statement of Physician  My signature below affirms that prior to the time of the procedure, I have explained to the patient and/or his/her guardian, the risks and benefits involved in the proposed treatment and any reasonable alternative to the proposed treatment.  I have also explained the risks and benefits involved in the refusal of the proposed treatment and have answered the patient's questions.                        Date:  ______/______/_______  Provider                      Signature:  __________________________________________________________       Time:  ___________ A.M    P.M.

## (undated) NOTE — LETTER
AUTHORIZATION FOR SURGICAL OPERATION OR OTHER PROCEDURE    1.  I hereby authorize Dr. Leon De Los Santos  and [facility] staff assigned to my case to perform the following operation and/or procedure at the [facility]:    Cortisone injection in L Time:  ________ A. M.  P.M.        Patient Name:  ______________________________________________________  (please print)      Patient signature:  ___________________________________________________             Relationship to Patient:           []  Mark Pierre

## (undated) NOTE — LETTER
AUTHORIZATION FOR SURGICAL OPERATION OR OTHER PROCEDURE    1. I hereby authorize Dr. Erik Jacobs, and [address] [place] [address] staff assigned to my case to perform the following operation and/or procedure at the [address] [place] [address]:    Left foot cortisone injection _______________________________________________________________________________________________      _______________________________________________________________________________________________    2. My physician has explained the nature and purpose of the operation or other procedure, possible alternative methods of treatment, the risks involved, and the possibility of complication to me. I acknowledge that no guarantee has been made as to the result that may be obtained. 3.  I recognize that, during the course of this operation, or other procedure, unforseen conditions may necessitate additional or different procedure than those listed above. I, therefore, further authorize and request that the above named physician, his/her physician assistants or designees perform such procedures as are, in his/her professional opinion, necessary and desirable. 4.  Any tissue or organs removed in the operation or other procedure may be disposed of by and at the discretion of the [address] [place] [address] and [facility]. 5.  I understand that in the event of a medical emergency, I will be transported by local paramedics to [facility] or other hospital emergency department. 6.  I certify that I have read and fully understand the above consent to operation and/or other procedure. 7.  I acknowledge that my physician has explained sedation/analgesia administration to me including the risks and benefits. I consent to the administration of sedation/analgesia as may be necessary or desirable in the judgement of my physician.     Witness signature: ___________________________________________________ Date:  ______/______/_____                    Time: ________ A. M.  P.M. Patient Name:  ______________________________________________________  (please print)      Patient signature:  ___________________________________________________             Relationship to Patient:           []  Parent    Responsible person                          []  Spouse  In case of minor or                    [] Other  _____________   Incompetent name:  __________________________________________________                               (please print)      _____________      Responsible person  In case of minor or  Incompetent signature:  _______________________________________________    Statement of Physician  My signature below affirms that prior to the time of the procedure, I have explained to the patient and/or his/her guardian, the risks and benefits involved in the proposed treatment and any reasonable alternative to the proposed treatment. I have also explained the risks and benefits involved in the refusal of the proposed treatment and have answered the patient's questions.                         Date:  ______/______/_______  Provider                      Signature:  __________________________________________________________       Time:  ___________ A.M    P.M.

## (undated) NOTE — LETTER
AUTHORIZATION FOR SURGICAL OPERATION OR OTHER PROCEDURE    1.  I hereby authorize Dr. Magnus Olszewski, and [address] [place] [address] staff assigned to my case to perform the following operation and/or procedure at the [address] [place] [address]:    ________________________________ ________ A. M.  P.M.        Patient Name:  ______________________________________________________  (please print)      Patient signature:  ___________________________________________________             Relationship to Patient:           []  Parent    Respon

## (undated) NOTE — LETTER
AUTHORIZATION FOR SURGICAL OPERATION OR OTHER PROCEDURE    1. I hereby authorize Dr. Felicia Roblero, and [facility] staff assigned to my case to perform the following operation and/or procedure at the [facility]:    Left foot cortisone injection _______________________________________________________________________________________________      _______________________________________________________________________________________________    2. My physician has explained the nature and purpose of the operation or other procedure, possible alternative methods of treatment, the risks involved, and the possibility of complication to me. I acknowledge that no guarantee has been made as to the result that may be obtained. 3.  I recognize that, during the course of this operation, or other procedure, unforseen conditions may necessitate additional or different procedure than those listed above. I, therefore, further authorize and request that the above named physician, his/her physician assistants or designees perform such procedures as are, in his/her professional opinion, necessary and desirable. 4.  Any tissue or organs removed in the operation or other procedure may be disposed of by and at the discretion of the [facility] and [facility]. 5.  I understand that in the event of a medical emergency, I will be transported by local paramedics to [facility] or other hospital emergency department. 6.  I certify that I have read and fully understand the above consent to operation and/or other procedure. 7.  I acknowledge that my physician has explained sedation/analgesia administration to me including the risks and benefits. I consent to the administration of sedation/analgesia as may be necessary or desirable in the judgement of my physician.     Witness signature: ___________________________________________________ Date:  ______/______/_____                    Time: ________ A. M.  P.M. Patient Name:  ______________________________________________________  (please print)      Patient signature:  ___________________________________________________             Relationship to Patient:           []  Parent    Responsible person                          []  Spouse  In case of minor or                    [] Other  _____________   Incompetent name:  __________________________________________________                               (please print)      _____________      Responsible person  In case of minor or  Incompetent signature:  _______________________________________________    Statement of Physician  My signature below affirms that prior to the time of the procedure, I have explained to the patient and/or his/her guardian, the risks and benefits involved in the proposed treatment and any reasonable alternative to the proposed treatment. I have also explained the risks and benefits involved in the refusal of the proposed treatment and have answered the patient's questions.                         Date:  ______/______/_______  Provider                      Signature:  __________________________________________________________       Time:  ___________ A.M    P.M.